# Patient Record
Sex: FEMALE | Race: WHITE | NOT HISPANIC OR LATINO | Employment: OTHER | URBAN - METROPOLITAN AREA
[De-identification: names, ages, dates, MRNs, and addresses within clinical notes are randomized per-mention and may not be internally consistent; named-entity substitution may affect disease eponyms.]

---

## 2017-12-28 ENCOUNTER — HOSPITAL ENCOUNTER (OUTPATIENT)
Dept: RHEUMATOLOGY | Facility: CLINIC | Age: 62
Discharge: HOME | End: 2017-12-28
Attending: NURSE PRACTITIONER | Admitting: NURSE PRACTITIONER

## 2020-11-13 ENCOUNTER — APPOINTMENT (OUTPATIENT)
Dept: CT IMAGING | Facility: HOSPITAL | Age: 65
End: 2020-11-13

## 2020-11-13 ENCOUNTER — HOSPITAL ENCOUNTER (EMERGENCY)
Facility: HOSPITAL | Age: 65
Discharge: HOME | End: 2020-11-13
Admitting: EMERGENCY MEDICINE

## 2020-11-13 VITALS
OXYGEN SATURATION: 96 % | TEMPERATURE: 98.2 F | HEART RATE: 63 BPM | BODY MASS INDEX: 30.34 KG/M2 | DIASTOLIC BLOOD PRESSURE: 59 MMHG | WEIGHT: 182.1 LBS | HEIGHT: 65 IN | RESPIRATION RATE: 15 BRPM | SYSTOLIC BLOOD PRESSURE: 135 MMHG

## 2020-11-13 DIAGNOSIS — R10.9 ABDOMINAL PAIN, UNSPECIFIED ABDOMINAL LOCATION: Primary | ICD-10-CM

## 2020-11-13 LAB
ALBUMIN SERPL-MCNC: 4.7 G/DL (ref 3.5–5.2)
ALBUMIN/GLOB SERPL: 2.2 G/DL
ALP SERPL-CCNC: 89 U/L (ref 39–117)
ALT SERPL W P-5'-P-CCNC: 39 U/L (ref 1–33)
ANION GAP SERPL CALCULATED.3IONS-SCNC: 11 MMOL/L (ref 5–15)
AST SERPL-CCNC: 26 U/L (ref 1–32)
BASOPHILS # BLD AUTO: 0.1 10*3/MM3 (ref 0–0.2)
BASOPHILS NFR BLD AUTO: 1.2 % (ref 0–1.5)
BILIRUB SERPL-MCNC: 0.4 MG/DL (ref 0–1.2)
BILIRUB UR QL STRIP: NEGATIVE
BUN SERPL-MCNC: 14 MG/DL (ref 8–23)
BUN/CREAT SERPL: 15.7 (ref 7–25)
CALCIUM SPEC-SCNC: 9.3 MG/DL (ref 8.6–10.5)
CHLORIDE SERPL-SCNC: 102 MMOL/L (ref 98–107)
CLARITY UR: CLEAR
CO2 SERPL-SCNC: 32 MMOL/L (ref 22–29)
COLOR UR: YELLOW
CREAT SERPL-MCNC: 0.89 MG/DL (ref 0.57–1)
DEPRECATED RDW RBC AUTO: 43.3 FL (ref 37–54)
EOSINOPHIL # BLD AUTO: 0.1 10*3/MM3 (ref 0–0.4)
EOSINOPHIL NFR BLD AUTO: 1.7 % (ref 0.3–6.2)
ERYTHROCYTE [DISTWIDTH] IN BLOOD BY AUTOMATED COUNT: 13.9 % (ref 12.3–15.4)
GFR SERPL CREATININE-BSD FRML MDRD: 64 ML/MIN/1.73
GLOBULIN UR ELPH-MCNC: 2.1 GM/DL
GLUCOSE SERPL-MCNC: 107 MG/DL (ref 65–99)
GLUCOSE UR STRIP-MCNC: NEGATIVE MG/DL
HCT VFR BLD AUTO: 45.3 % (ref 34–46.6)
HEMOCCULT STL QL IA: NEGATIVE
HGB BLD-MCNC: 15.3 G/DL (ref 12–15.9)
HGB UR QL STRIP.AUTO: NEGATIVE
HOLD SPECIMEN: NORMAL
KETONES UR QL STRIP: NEGATIVE
LEUKOCYTE ESTERASE UR QL STRIP.AUTO: NEGATIVE
LIPASE SERPL-CCNC: 56 U/L (ref 13–60)
LYMPHOCYTES # BLD AUTO: 1.9 10*3/MM3 (ref 0.7–3.1)
LYMPHOCYTES NFR BLD AUTO: 28.4 % (ref 19.6–45.3)
MCH RBC QN AUTO: 29.5 PG (ref 26.6–33)
MCHC RBC AUTO-ENTMCNC: 33.7 G/DL (ref 31.5–35.7)
MCV RBC AUTO: 87.6 FL (ref 79–97)
MONOCYTES # BLD AUTO: 0.6 10*3/MM3 (ref 0.1–0.9)
MONOCYTES NFR BLD AUTO: 9 % (ref 5–12)
NEUTROPHILS NFR BLD AUTO: 3.9 10*3/MM3 (ref 1.7–7)
NEUTROPHILS NFR BLD AUTO: 59.7 % (ref 42.7–76)
NITRITE UR QL STRIP: NEGATIVE
NRBC BLD AUTO-RTO: 0.1 /100 WBC (ref 0–0.2)
PH UR STRIP.AUTO: 7 [PH] (ref 5–8)
PLATELET # BLD AUTO: 229 10*3/MM3 (ref 140–450)
PMV BLD AUTO: 9.2 FL (ref 6–12)
POTASSIUM SERPL-SCNC: 3.3 MMOL/L (ref 3.5–5.2)
PROT SERPL-MCNC: 6.8 G/DL (ref 6–8.5)
PROT UR QL STRIP: NEGATIVE
RBC # BLD AUTO: 5.17 10*6/MM3 (ref 3.77–5.28)
SARS-COV-2 RNA PNL SPEC NAA+PROBE: NOT DETECTED
SODIUM SERPL-SCNC: 145 MMOL/L (ref 136–145)
SP GR UR STRIP: 1.01 (ref 1–1.03)
UROBILINOGEN UR QL STRIP: NORMAL
WBC # BLD AUTO: 6.5 10*3/MM3 (ref 3.4–10.8)

## 2020-11-13 PROCEDURE — 96375 TX/PRO/DX INJ NEW DRUG ADDON: CPT

## 2020-11-13 PROCEDURE — 83690 ASSAY OF LIPASE: CPT | Performed by: NURSE PRACTITIONER

## 2020-11-13 PROCEDURE — 80053 COMPREHEN METABOLIC PANEL: CPT | Performed by: NURSE PRACTITIONER

## 2020-11-13 PROCEDURE — 87635 SARS-COV-2 COVID-19 AMP PRB: CPT | Performed by: NURSE PRACTITIONER

## 2020-11-13 PROCEDURE — 96374 THER/PROPH/DIAG INJ IV PUSH: CPT

## 2020-11-13 PROCEDURE — 99283 EMERGENCY DEPT VISIT LOW MDM: CPT

## 2020-11-13 PROCEDURE — 0 IOPAMIDOL PER 1 ML: Performed by: NURSE PRACTITIONER

## 2020-11-13 PROCEDURE — 74177 CT ABD & PELVIS W/CONTRAST: CPT

## 2020-11-13 PROCEDURE — 25010000002 ONDANSETRON PER 1 MG: Performed by: NURSE PRACTITIONER

## 2020-11-13 PROCEDURE — 85025 COMPLETE CBC W/AUTO DIFF WBC: CPT | Performed by: NURSE PRACTITIONER

## 2020-11-13 PROCEDURE — 25010000002 MORPHINE PER 10 MG: Performed by: NURSE PRACTITIONER

## 2020-11-13 PROCEDURE — 81003 URINALYSIS AUTO W/O SCOPE: CPT | Performed by: NURSE PRACTITIONER

## 2020-11-13 PROCEDURE — 82274 ASSAY TEST FOR BLOOD FECAL: CPT | Performed by: NURSE PRACTITIONER

## 2020-11-13 RX ORDER — MORPHINE SULFATE 4 MG/ML
4 INJECTION, SOLUTION INTRAMUSCULAR; INTRAVENOUS ONCE
Status: COMPLETED | OUTPATIENT
Start: 2020-11-13 | End: 2020-11-13

## 2020-11-13 RX ORDER — SODIUM CHLORIDE 0.9 % (FLUSH) 0.9 %
10 SYRINGE (ML) INJECTION AS NEEDED
Status: DISCONTINUED | OUTPATIENT
Start: 2020-11-13 | End: 2020-11-14 | Stop reason: HOSPADM

## 2020-11-13 RX ORDER — ONDANSETRON 2 MG/ML
4 INJECTION INTRAMUSCULAR; INTRAVENOUS ONCE
Status: COMPLETED | OUTPATIENT
Start: 2020-11-13 | End: 2020-11-13

## 2020-11-13 RX ADMIN — ONDANSETRON 4 MG: 2 INJECTION INTRAMUSCULAR; INTRAVENOUS at 21:28

## 2020-11-13 RX ADMIN — IOPAMIDOL 100 ML: 755 INJECTION, SOLUTION INTRAVENOUS at 22:29

## 2020-11-13 RX ADMIN — SODIUM CHLORIDE 1000 ML: 9 INJECTION, SOLUTION INTRAVENOUS at 21:21

## 2020-11-13 RX ADMIN — MORPHINE SULFATE 4 MG: 4 INJECTION INTRAVENOUS at 21:28

## 2020-11-14 NOTE — ED PROVIDER NOTES
Subjective   History: Patient is a 64-year-old female complains of abdominal pain that is progressively getting worse.  She states has been going on for the last few months started around her umbilicus, sharp and stabbing waxes and wanes eating makes it worse.  States 4 days ago she had acute onset diarrhea twice with nausea and sweating while she was on the toilet.  Says she has had some low back discomfort.  Reports dark or black stool for 1 week and has had slimy and stringy BMs.  History cholecystectomy.  Reports her last colonoscopy was approximately 5 years ago states she did have some polyps but that could have been on her first colonoscopy says she believes last colonoscopy was fine.  Denies any fever chills chest pain short of breath dysuria loss of taste or smell.      Onset: Few months: Worse the last 4 days  Location: Abdomen  Duration: Waxes and wanes  Character: Initially was sharp but states now more pressure-like or heavy  Aggravating/Alleviating factors: Eating makes it worse  Radiation nonradiating  Severity: Moderate            Review of Systems   Constitutional: Negative for chills, fatigue and fever.   HENT: Negative for congestion, sore throat, tinnitus and trouble swallowing.    Eyes: Negative for photophobia, discharge and visual disturbance.   Respiratory: Negative for cough and shortness of breath.    Cardiovascular: Negative for chest pain.   Gastrointestinal: Positive for abdominal pain, diarrhea and nausea. Negative for vomiting.   Genitourinary: Negative for dysuria, frequency and urgency.   Musculoskeletal: Positive for back pain. Negative for myalgias.   Skin: Negative for rash.   Neurological: Negative for dizziness and headaches.   Psychiatric/Behavioral: Negative for confusion.       No past medical history on file.    No Known Allergies    No past surgical history on file.    No family history on file.    Social History     Socioeconomic History   • Marital status:       "Spouse name: Not on file   • Number of children: Not on file   • Years of education: Not on file   • Highest education level: Not on file           Objective   Physical Exam  Constitutional:       General: She is not in acute distress.     Appearance: She is well-developed.   HENT:      Head: Normocephalic and atraumatic.      Mouth/Throat:      Mouth: Mucous membranes are moist.      Pharynx: Oropharynx is clear.   Eyes:      Pupils: Pupils are equal, round, and reactive to light.   Cardiovascular:      Rate and Rhythm: Normal rate.      Heart sounds: No murmur.   Pulmonary:      Effort: Pulmonary effort is normal.      Breath sounds: Normal breath sounds.   Abdominal:      General: Bowel sounds are decreased. There is no distension.      Palpations: Abdomen is soft.      Tenderness: There is abdominal tenderness in the periumbilical area. There is no right CVA tenderness, left CVA tenderness, guarding or rebound. Negative signs include Trimble's sign, Rovsing's sign and McBurney's sign.   Skin:     General: Skin is warm and dry.      Findings: No rash.   Neurological:      Mental Status: She is alert and oriented to person, place, and time.         Procedures           ED Course    /80   Pulse 70   Temp 98.2 °F (36.8 °C) (Oral)   Resp 20   Ht 165.1 cm (65\")   Wt 82.6 kg (182 lb 1.6 oz)   SpO2 92%   BMI 30.30 kg/m²   Labs Reviewed   COMPREHENSIVE METABOLIC PANEL - Abnormal; Notable for the following components:       Result Value    Glucose 107 (*)     Potassium 3.3 (*)     CO2 32.0 (*)     ALT (SGPT) 39 (*)     All other components within normal limits    Narrative:     GFR Normal >60  Chronic Kidney Disease <60  Kidney Failure <15     COVID-19,ABBOTT IN-HOUSE,NP SWAB (NO TRANSPORT MEDIA) 2 HR TAT - Normal    Narrative:     Fact sheet for providers: https://www.fda.gov/media/854141/download     Fact sheet for patients: https://www.fda.gov/media/479870/download   LIPASE - Normal   URINALYSIS W/ CULTURE " IF INDICATED - Normal    Narrative:     Urine microscopic not indicated.   CBC WITH AUTO DIFFERENTIAL - Normal   OCCULT BLOOD, FECAL BY IMMUNOASSAY - Normal   CBC AND DIFFERENTIAL    Narrative:     The following orders were created for panel order CBC & Differential.  Procedure                               Abnormality         Status                     ---------                               -----------         ------                     CBC Auto Differential[[id number]]        Normal              Final result                 Please view results for these tests on the individual orders.   EXTRA TUBES    Narrative:     The following orders were created for panel order Extra Tubes.  Procedure                               Abnormality         Status                     ---------                               -----------         ------                     Gold Top - SST[[id number]]                                   Final result                 Please view results for these tests on the individual orders.   GOLD TOP - SST     Medications   sodium chloride 0.9 % flush 10 mL (has no administration in time range)   sodium chloride 0.9 % bolus 1,000 mL (1,000 mL Intravenous New Bag 11/13/20 2121)   Morphine sulfate (PF) injection 4 mg (4 mg Intravenous Given 11/13/20 2128)   ondansetron (ZOFRAN) injection 4 mg (4 mg Intravenous Given 11/13/20 2128)   iopamidol (ISOVUE-370) 76 % injection 100 mL (100 mL Intravenous Given 11/13/20 2229)     Ct Abdomen Pelvis With Contrast    Result Date: 11/13/2020  1.  Negative for appendicitis. 2. There is intra and extrahepatic biliary dilatation that appears similar on axial images going back to 2009, and is therefore likely not clinically significant. Recommend correlation with appropriate blood work. 3. Hepatic steatosis. 4. The distal descending colon and the sigmoid colon are contracted and the wall thickness cannot be evaluated. There is no surrounding inflammatory change. There is  diverticulosis of the distal sigmoid. 5. The right kidney is in the pelvis. 6. The 1.8 cm left renal lesion is slightly denser than a simple cyst. Nonemergent renal ultrasound is recommended for further evaluation.  Electronically Signed By-Marcy Manriquez MD On:11/13/2020 10:53 PM This report was finalized on [id number] by  Marcy Manriquez MD.                                               MDM     I examined the patient using the appropriate personal protective equipment.      DISPOSITION:   Chart Review: No procedures or previous CT found in chart  Comorbidity:  has no past medical history on file.    Labs: CBC unremarkable CMP glucose 107 potassium 3.3 CO2 32 ALT 39 AST 26 alk phos 89    Imaging:   Ct Abdomen Pelvis With Contrast    Result Date: 11/13/2020  1.  Negative for appendicitis. 2. There is intra and extrahepatic biliary dilatation that appears similar on axial images going back to 2009, and is therefore likely not clinically significant. Recommend correlation with appropriate blood work. 3. Hepatic steatosis. 4. The distal descending colon and the sigmoid colon are contracted and the wall thickness cannot be evaluated. There is no surrounding inflammatory change. There is diverticulosis of the distal sigmoid. 5. The right kidney is in the pelvis. 6. The 1.8 cm left renal lesion is slightly denser than a simple cyst. Nonemergent renal ultrasound is recommended for further evaluation.  Electronically Signed By-Marcy Manriquez MD On:11/13/2020 10:53 PM This report was finalized on [id number] by  Marcy Manriquez MD.      Disposition/Treatment:    Patient had IV established 1 L normal saline given morphine and Zofran.  She also had blood drawn urine collected Covid swab occult blood and CT abdomen pelvis.  Lab work was fairly unremarkable she had a normal white count and lipase.  ALT slightly elevated at 39 AST and alk phos were normal.  Urinalysis was unremarkable occult blood was negative and Covid swab was  negative.  CT abdomen pelvis did not show any definitive reason as to why patient has had abdominal discomfort over the last few months.  Was intra and extrahepatic biliary dilatation that was similar to previous images in 2009 that I could not locate in the computer, but may benefit from ultrasound.  There was diverticulosis without diverticulitis in the distal sigmoid without any inflammatory changes.  There was a 1.8 cm left renal lesion found that not emergent renal  ultrasound was further recommended.  On reevaluation of patient she stated she felt a little nauseated.  I discussed results with patient and told her to follow-up with her PCP for nonemergent renal ultrasound for lesion seen on CT. no definitive finding on CT for abdominal discomfort therefore will refer patient to gastroenterology.  Emergent reasons to return to ER discussed with patient.  Offered to write patient prescription for Zofran but she stated she did not think she needed it.    Prescription: None      Final diagnoses:   Abdominal pain, unspecified abdominal location            Radha Iqbal, APRN  11/13/20 2273

## 2020-11-14 NOTE — DISCHARGE INSTRUCTIONS
Discharge home.  Follow-up with gastroenterologist this week.  If you begin to run fever have worsening of abdominal pain nausea vomiting return to ER.

## 2020-11-18 ENCOUNTER — OFFICE (AMBULATORY)
Dept: URBAN - METROPOLITAN AREA CLINIC 64 | Facility: CLINIC | Age: 65
End: 2020-11-18

## 2020-11-18 VITALS
SYSTOLIC BLOOD PRESSURE: 171 MMHG | DIASTOLIC BLOOD PRESSURE: 101 MMHG | WEIGHT: 181 LBS | HEART RATE: 73 BPM | HEIGHT: 65 IN

## 2020-11-18 DIAGNOSIS — R94.5 ABNORMAL RESULTS OF LIVER FUNCTION STUDIES: ICD-10-CM

## 2020-11-18 DIAGNOSIS — R19.7 DIARRHEA, UNSPECIFIED: ICD-10-CM

## 2020-11-18 DIAGNOSIS — R11.2 NAUSEA WITH VOMITING, UNSPECIFIED: ICD-10-CM

## 2020-11-18 DIAGNOSIS — R93.3 ABNORMAL FINDINGS ON DIAGNOSTIC IMAGING OF OTHER PARTS OF DI: ICD-10-CM

## 2020-11-18 DIAGNOSIS — R10.9 UNSPECIFIED ABDOMINAL PAIN: ICD-10-CM

## 2020-11-18 DIAGNOSIS — Z86.010 PERSONAL HISTORY OF COLONIC POLYPS: ICD-10-CM

## 2020-11-18 PROCEDURE — 99203 OFFICE O/P NEW LOW 30 MIN: CPT | Performed by: NURSE PRACTITIONER

## 2020-11-18 RX ORDER — COLESEVELAM HYDROCHLORIDE 625 MG/1
625 TABLET, FILM COATED ORAL
Qty: 30 | Refills: 11 | Status: ACTIVE
Start: 2020-11-18

## 2021-01-06 ENCOUNTER — OFFICE (AMBULATORY)
Dept: URBAN - METROPOLITAN AREA PATHOLOGY 4 | Facility: PATHOLOGY | Age: 66
End: 2021-01-06
Payer: COMMERCIAL

## 2021-01-06 ENCOUNTER — ON CAMPUS - OUTPATIENT (AMBULATORY)
Dept: URBAN - METROPOLITAN AREA HOSPITAL 2 | Facility: HOSPITAL | Age: 66
End: 2021-01-06
Payer: COMMERCIAL

## 2021-01-06 VITALS
DIASTOLIC BLOOD PRESSURE: 63 MMHG | OXYGEN SATURATION: 96 % | DIASTOLIC BLOOD PRESSURE: 79 MMHG | SYSTOLIC BLOOD PRESSURE: 157 MMHG | SYSTOLIC BLOOD PRESSURE: 121 MMHG | DIASTOLIC BLOOD PRESSURE: 116 MMHG | HEART RATE: 58 BPM | DIASTOLIC BLOOD PRESSURE: 75 MMHG | DIASTOLIC BLOOD PRESSURE: 78 MMHG | DIASTOLIC BLOOD PRESSURE: 66 MMHG | HEART RATE: 68 BPM | OXYGEN SATURATION: 97 % | DIASTOLIC BLOOD PRESSURE: 110 MMHG | DIASTOLIC BLOOD PRESSURE: 68 MMHG | RESPIRATION RATE: 16 BRPM | SYSTOLIC BLOOD PRESSURE: 143 MMHG | HEART RATE: 70 BPM | OXYGEN SATURATION: 94 % | SYSTOLIC BLOOD PRESSURE: 124 MMHG | OXYGEN SATURATION: 95 % | HEART RATE: 60 BPM | SYSTOLIC BLOOD PRESSURE: 148 MMHG | TEMPERATURE: 96.9 F | HEART RATE: 64 BPM | RESPIRATION RATE: 18 BRPM | RESPIRATION RATE: 14 BRPM | HEART RATE: 83 BPM | OXYGEN SATURATION: 98 % | HEIGHT: 65 IN | SYSTOLIC BLOOD PRESSURE: 140 MMHG | SYSTOLIC BLOOD PRESSURE: 166 MMHG | WEIGHT: 178 LBS | SYSTOLIC BLOOD PRESSURE: 127 MMHG

## 2021-01-06 DIAGNOSIS — K57.30 DIVERTICULOSIS OF LARGE INTESTINE WITHOUT PERFORATION OR ABS: ICD-10-CM

## 2021-01-06 DIAGNOSIS — K64.4 RESIDUAL HEMORRHOIDAL SKIN TAGS: ICD-10-CM

## 2021-01-06 DIAGNOSIS — K63.5 POLYP OF COLON: ICD-10-CM

## 2021-01-06 DIAGNOSIS — Z86.010 PERSONAL HISTORY OF COLONIC POLYPS: ICD-10-CM

## 2021-01-06 DIAGNOSIS — K64.1 SECOND DEGREE HEMORRHOIDS: ICD-10-CM

## 2021-01-06 LAB
GI HISTOLOGY: A. UNSPECIFIED: (no result)
GI HISTOLOGY: PDF REPORT: (no result)

## 2021-01-06 PROCEDURE — 45380 COLONOSCOPY AND BIOPSY: CPT | Mod: 33 | Performed by: INTERNAL MEDICINE

## 2021-01-06 PROCEDURE — 88305 TISSUE EXAM BY PATHOLOGIST: CPT | Mod: 33 | Performed by: INTERNAL MEDICINE

## 2021-01-20 ENCOUNTER — OFFICE (AMBULATORY)
Dept: URBAN - METROPOLITAN AREA CLINIC 64 | Facility: CLINIC | Age: 66
End: 2021-01-20

## 2021-01-20 VITALS
DIASTOLIC BLOOD PRESSURE: 108 MMHG | WEIGHT: 182 LBS | SYSTOLIC BLOOD PRESSURE: 167 MMHG | HEART RATE: 76 BPM | HEIGHT: 65 IN

## 2021-01-20 DIAGNOSIS — K30 FUNCTIONAL DYSPEPSIA: ICD-10-CM

## 2021-01-20 DIAGNOSIS — R10.9 UNSPECIFIED ABDOMINAL PAIN: ICD-10-CM

## 2021-01-20 DIAGNOSIS — R11.2 NAUSEA WITH VOMITING, UNSPECIFIED: ICD-10-CM

## 2021-01-20 PROCEDURE — 99214 OFFICE O/P EST MOD 30 MIN: CPT | Performed by: INTERNAL MEDICINE

## 2021-04-06 ENCOUNTER — OFFICE VISIT (OUTPATIENT)
Dept: SURGERY | Facility: CLINIC | Age: 66
End: 2021-04-06

## 2021-04-06 ENCOUNTER — ANESTHESIA EVENT (OUTPATIENT)
Dept: PERIOP | Facility: HOSPITAL | Age: 66
End: 2021-04-06

## 2021-04-06 ENCOUNTER — HOSPITAL ENCOUNTER (OUTPATIENT)
Facility: HOSPITAL | Age: 66
Discharge: HOME | End: 2021-04-09
Attending: SURGERY | Admitting: SURGERY

## 2021-04-06 ENCOUNTER — ANESTHESIA (OUTPATIENT)
Dept: PERIOP | Facility: HOSPITAL | Age: 66
End: 2021-04-06

## 2021-04-06 ENCOUNTER — PREP FOR SURGERY (OUTPATIENT)
Dept: OTHER | Facility: HOSPITAL | Age: 66
End: 2021-04-06

## 2021-04-06 VITALS
DIASTOLIC BLOOD PRESSURE: 75 MMHG | TEMPERATURE: 97.5 F | OXYGEN SATURATION: 94 % | BODY MASS INDEX: 29.32 KG/M2 | SYSTOLIC BLOOD PRESSURE: 133 MMHG | HEIGHT: 65 IN | WEIGHT: 176 LBS | HEART RATE: 88 BPM

## 2021-04-06 DIAGNOSIS — L02.01 ABSCESS OF FACE: Primary | ICD-10-CM

## 2021-04-06 DIAGNOSIS — L02.01 ABSCESS OF FACE: ICD-10-CM

## 2021-04-06 PROBLEM — E03.9 HYPOTHYROIDISM (ACQUIRED): Status: ACTIVE | Noted: 2021-04-06

## 2021-04-06 PROBLEM — E87.6 HYPOKALEMIA: Chronic | Status: ACTIVE | Noted: 2021-04-06

## 2021-04-06 PROBLEM — R79.89 ELEVATED SERUM CREATININE: Chronic | Status: ACTIVE | Noted: 2021-04-06

## 2021-04-06 PROBLEM — E78.5 HYPERLIPIDEMIA: Status: ACTIVE | Noted: 2021-04-06

## 2021-04-06 PROBLEM — I10 ESSENTIAL HYPERTENSION: Chronic | Status: ACTIVE | Noted: 2021-04-06

## 2021-04-06 LAB
ALBUMIN SERPL-MCNC: 4.1 G/DL (ref 3.5–5.2)
ALBUMIN/GLOB SERPL: 1.3 G/DL
ALP SERPL-CCNC: 102 U/L (ref 39–117)
ALT SERPL W P-5'-P-CCNC: 107 U/L (ref 1–33)
ANION GAP SERPL CALCULATED.3IONS-SCNC: 14 MMOL/L (ref 5–15)
AST SERPL-CCNC: 59 U/L (ref 1–32)
BASOPHILS # BLD AUTO: 0.1 10*3/MM3 (ref 0–0.2)
BASOPHILS NFR BLD AUTO: 0.7 % (ref 0–1.5)
BILIRUB SERPL-MCNC: 0.4 MG/DL (ref 0–1.2)
BUN SERPL-MCNC: 27 MG/DL (ref 8–23)
BUN/CREAT SERPL: 22.7 (ref 7–25)
CALCIUM SPEC-SCNC: 9.4 MG/DL (ref 8.6–10.5)
CHLORIDE SERPL-SCNC: 95 MMOL/L (ref 98–107)
CO2 SERPL-SCNC: 32 MMOL/L (ref 22–29)
CREAT SERPL-MCNC: 1.19 MG/DL (ref 0.57–1)
DEPRECATED RDW RBC AUTO: 43.8 FL (ref 37–54)
EOSINOPHIL # BLD AUTO: 0 10*3/MM3 (ref 0–0.4)
EOSINOPHIL NFR BLD AUTO: 0.3 % (ref 0.3–6.2)
ERYTHROCYTE [DISTWIDTH] IN BLOOD BY AUTOMATED COUNT: 14.1 % (ref 12.3–15.4)
GFR SERPL CREATININE-BSD FRML MDRD: 46 ML/MIN/1.73
GLOBULIN UR ELPH-MCNC: 3.2 GM/DL
GLUCOSE SERPL-MCNC: 104 MG/DL (ref 65–99)
HCT VFR BLD AUTO: 39.8 % (ref 34–46.6)
HGB BLD-MCNC: 13.3 G/DL (ref 12–15.9)
LYMPHOCYTES # BLD AUTO: 1.5 10*3/MM3 (ref 0.7–3.1)
LYMPHOCYTES NFR BLD AUTO: 11.3 % (ref 19.6–45.3)
MAGNESIUM SERPL-MCNC: 2.1 MG/DL (ref 1.6–2.4)
MCH RBC QN AUTO: 29.6 PG (ref 26.6–33)
MCHC RBC AUTO-ENTMCNC: 33.6 G/DL (ref 31.5–35.7)
MCV RBC AUTO: 88.2 FL (ref 79–97)
MONOCYTES # BLD AUTO: 0.9 10*3/MM3 (ref 0.1–0.9)
MONOCYTES NFR BLD AUTO: 6.7 % (ref 5–12)
NEUTROPHILS NFR BLD AUTO: 11.1 10*3/MM3 (ref 1.7–7)
NEUTROPHILS NFR BLD AUTO: 81 % (ref 42.7–76)
NRBC BLD AUTO-RTO: 0 /100 WBC (ref 0–0.2)
PLATELET # BLD AUTO: 240 10*3/MM3 (ref 140–450)
PMV BLD AUTO: 9.7 FL (ref 6–12)
POTASSIUM SERPL-SCNC: 3.4 MMOL/L (ref 3.5–5.2)
PROT SERPL-MCNC: 7.3 G/DL (ref 6–8.5)
QT INTERVAL: 430 MS
RBC # BLD AUTO: 4.51 10*6/MM3 (ref 3.77–5.28)
SARS-COV-2 RNA PNL SPEC NAA+PROBE: NOT DETECTED
SODIUM SERPL-SCNC: 141 MMOL/L (ref 136–145)
TSH SERPL DL<=0.05 MIU/L-ACNC: 4.47 UIU/ML (ref 0.27–4.2)
WBC # BLD AUTO: 13.7 10*3/MM3 (ref 3.4–10.8)

## 2021-04-06 PROCEDURE — 80053 COMPREHEN METABOLIC PANEL: CPT | Performed by: SURGERY

## 2021-04-06 PROCEDURE — 87070 CULTURE OTHR SPECIMN AEROBIC: CPT | Performed by: SURGERY

## 2021-04-06 PROCEDURE — 85025 COMPLETE CBC W/AUTO DIFF WBC: CPT | Performed by: SURGERY

## 2021-04-06 PROCEDURE — 25010000002 DEXAMETHASONE PER 1 MG: Performed by: ANESTHESIOLOGY

## 2021-04-06 PROCEDURE — 99203 OFFICE O/P NEW LOW 30 MIN: CPT | Performed by: SURGERY

## 2021-04-06 PROCEDURE — 25010000002 FENTANYL CITRATE (PF) 100 MCG/2ML SOLUTION: Performed by: ANESTHESIOLOGY

## 2021-04-06 PROCEDURE — G0378 HOSPITAL OBSERVATION PER HR: HCPCS

## 2021-04-06 PROCEDURE — 25010000002 PROPOFOL 10 MG/ML EMULSION: Performed by: ANESTHESIOLOGY

## 2021-04-06 PROCEDURE — 83735 ASSAY OF MAGNESIUM: CPT | Performed by: PHYSICIAN ASSISTANT

## 2021-04-06 PROCEDURE — 25010000002 ONDANSETRON PER 1 MG: Performed by: ANESTHESIOLOGY

## 2021-04-06 PROCEDURE — U0003 INFECTIOUS AGENT DETECTION BY NUCLEIC ACID (DNA OR RNA); SEVERE ACUTE RESPIRATORY SYNDROME CORONAVIRUS 2 (SARS-COV-2) (CORONAVIRUS DISEASE [COVID-19]), AMPLIFIED PROBE TECHNIQUE, MAKING USE OF HIGH THROUGHPUT TECHNOLOGIES AS DESCRIBED BY CMS-2020-01-R: HCPCS | Performed by: SURGERY

## 2021-04-06 PROCEDURE — 87147 CULTURE TYPE IMMUNOLOGIC: CPT | Performed by: SURGERY

## 2021-04-06 PROCEDURE — 84443 ASSAY THYROID STIM HORMONE: CPT | Performed by: PHYSICIAN ASSISTANT

## 2021-04-06 PROCEDURE — 87040 BLOOD CULTURE FOR BACTERIA: CPT | Performed by: INTERNAL MEDICINE

## 2021-04-06 PROCEDURE — C9803 HOPD COVID-19 SPEC COLLECT: HCPCS

## 2021-04-06 PROCEDURE — 25010000002 MORPHINE PER 10 MG: Performed by: ANESTHESIOLOGY

## 2021-04-06 PROCEDURE — 93005 ELECTROCARDIOGRAM TRACING: CPT | Performed by: SURGERY

## 2021-04-06 PROCEDURE — 25010000002 CEFAZOLIN PER 500 MG: Performed by: SURGERY

## 2021-04-06 PROCEDURE — 87186 SC STD MICRODIL/AGAR DIL: CPT | Performed by: SURGERY

## 2021-04-06 PROCEDURE — 10060 I&D ABSCESS SIMPLE/SINGLE: CPT | Performed by: SURGERY

## 2021-04-06 PROCEDURE — 25010000002 LINEZOLID 600 MG/300ML SOLUTION: Performed by: INTERNAL MEDICINE

## 2021-04-06 PROCEDURE — 87205 SMEAR GRAM STAIN: CPT | Performed by: SURGERY

## 2021-04-06 PROCEDURE — 99218 PR INITIAL OBSERVATION CARE/DAY 30 MINUTES: CPT | Performed by: PHYSICIAN ASSISTANT

## 2021-04-06 RX ORDER — ROCURONIUM BROMIDE 10 MG/ML
INJECTION, SOLUTION INTRAVENOUS AS NEEDED
Status: DISCONTINUED | OUTPATIENT
Start: 2021-04-06 | End: 2021-04-06 | Stop reason: SURG

## 2021-04-06 RX ORDER — POTASSIUM CHLORIDE 20 MEQ/1
40 TABLET, EXTENDED RELEASE ORAL AS NEEDED
Status: DISCONTINUED | OUTPATIENT
Start: 2021-04-06 | End: 2021-04-09 | Stop reason: HOSPADM

## 2021-04-06 RX ORDER — OXYCODONE HYDROCHLORIDE 5 MG/1
10 TABLET ORAL EVERY 4 HOURS PRN
Status: DISCONTINUED | OUTPATIENT
Start: 2021-04-06 | End: 2021-04-09 | Stop reason: HOSPADM

## 2021-04-06 RX ORDER — ONDANSETRON 2 MG/ML
4 INJECTION INTRAMUSCULAR; INTRAVENOUS ONCE AS NEEDED
Status: COMPLETED | OUTPATIENT
Start: 2021-04-06 | End: 2021-04-06

## 2021-04-06 RX ORDER — ACETAMINOPHEN 160 MG
TABLET,DISINTEGRATING ORAL AS NEEDED
Status: DISCONTINUED | OUTPATIENT
Start: 2021-04-06 | End: 2021-04-06 | Stop reason: HOSPADM

## 2021-04-06 RX ORDER — SODIUM CHLORIDE 0.9 % (FLUSH) 0.9 %
10 SYRINGE (ML) INJECTION AS NEEDED
Status: DISCONTINUED | OUTPATIENT
Start: 2021-04-06 | End: 2021-04-06 | Stop reason: HOSPADM

## 2021-04-06 RX ORDER — LIDOCAINE HYDROCHLORIDE 10 MG/ML
0.5 INJECTION, SOLUTION INFILTRATION; PERINEURAL ONCE AS NEEDED
Status: DISCONTINUED | OUTPATIENT
Start: 2021-04-06 | End: 2021-04-06 | Stop reason: HOSPADM

## 2021-04-06 RX ORDER — SODIUM CHLORIDE, SODIUM LACTATE, POTASSIUM CHLORIDE, CALCIUM CHLORIDE 600; 310; 30; 20 MG/100ML; MG/100ML; MG/100ML; MG/100ML
1000 INJECTION, SOLUTION INTRAVENOUS CONTINUOUS
Status: DISCONTINUED | OUTPATIENT
Start: 2021-04-06 | End: 2021-04-06

## 2021-04-06 RX ORDER — ATORVASTATIN CALCIUM 10 MG/1
10 TABLET, FILM COATED ORAL DAILY
Status: DISCONTINUED | OUTPATIENT
Start: 2021-04-07 | End: 2021-04-09 | Stop reason: HOSPADM

## 2021-04-06 RX ORDER — PHENYLEPHRINE HCL IN 0.9% NACL 1 MG/10 ML
SYRINGE (ML) INTRAVENOUS AS NEEDED
Status: DISCONTINUED | OUTPATIENT
Start: 2021-04-06 | End: 2021-04-06 | Stop reason: SURG

## 2021-04-06 RX ORDER — MORPHINE SULFATE 4 MG/ML
2 INJECTION, SOLUTION INTRAMUSCULAR; INTRAVENOUS
Status: DISCONTINUED | OUTPATIENT
Start: 2021-04-06 | End: 2021-04-06 | Stop reason: HOSPADM

## 2021-04-06 RX ORDER — SODIUM CHLORIDE 9 MG/ML
100 INJECTION, SOLUTION INTRAVENOUS CONTINUOUS
Status: DISCONTINUED | OUTPATIENT
Start: 2021-04-06 | End: 2021-04-06

## 2021-04-06 RX ORDER — GLYCOPYRROLATE 1 MG/5 ML
SYRINGE (ML) INTRAVENOUS AS NEEDED
Status: DISCONTINUED | OUTPATIENT
Start: 2021-04-06 | End: 2021-04-06 | Stop reason: SURG

## 2021-04-06 RX ORDER — LABETALOL HYDROCHLORIDE 5 MG/ML
10 INJECTION, SOLUTION INTRAVENOUS EVERY 6 HOURS PRN
Status: DISCONTINUED | OUTPATIENT
Start: 2021-04-06 | End: 2021-04-09 | Stop reason: HOSPADM

## 2021-04-06 RX ORDER — LIDOCAINE HYDROCHLORIDE 10 MG/ML
INJECTION, SOLUTION EPIDURAL; INFILTRATION; INTRACAUDAL; PERINEURAL AS NEEDED
Status: DISCONTINUED | OUTPATIENT
Start: 2021-04-06 | End: 2021-04-06 | Stop reason: SURG

## 2021-04-06 RX ORDER — ACETAMINOPHEN 650 MG/1
650 SUPPOSITORY RECTAL EVERY 4 HOURS PRN
Status: DISCONTINUED | OUTPATIENT
Start: 2021-04-06 | End: 2021-04-09 | Stop reason: HOSPADM

## 2021-04-06 RX ORDER — LINEZOLID 2 MG/ML
600 INJECTION, SOLUTION INTRAVENOUS EVERY 12 HOURS
Status: DISCONTINUED | OUTPATIENT
Start: 2021-04-06 | End: 2021-04-09 | Stop reason: HOSPADM

## 2021-04-06 RX ORDER — POTASSIUM CHLORIDE 1.5 G/1.77G
40 POWDER, FOR SOLUTION ORAL AS NEEDED
Status: DISCONTINUED | OUTPATIENT
Start: 2021-04-06 | End: 2021-04-09 | Stop reason: HOSPADM

## 2021-04-06 RX ORDER — NEOSTIGMINE METHYLSULFATE 5 MG/5 ML
SYRINGE (ML) INTRAVENOUS AS NEEDED
Status: DISCONTINUED | OUTPATIENT
Start: 2021-04-06 | End: 2021-04-06 | Stop reason: SURG

## 2021-04-06 RX ORDER — FAMOTIDINE 10 MG/ML
20 INJECTION, SOLUTION INTRAVENOUS 2 TIMES DAILY
Status: DISCONTINUED | OUTPATIENT
Start: 2021-04-06 | End: 2021-04-09 | Stop reason: HOSPADM

## 2021-04-06 RX ORDER — POTASSIUM CHLORIDE 7.45 MG/ML
10 INJECTION INTRAVENOUS
Status: DISCONTINUED | OUTPATIENT
Start: 2021-04-06 | End: 2021-04-09 | Stop reason: HOSPADM

## 2021-04-06 RX ORDER — ONDANSETRON 2 MG/ML
4 INJECTION INTRAMUSCULAR; INTRAVENOUS EVERY 6 HOURS PRN
Status: DISCONTINUED | OUTPATIENT
Start: 2021-04-06 | End: 2021-04-09 | Stop reason: HOSPADM

## 2021-04-06 RX ORDER — PROMETHAZINE HYDROCHLORIDE 12.5 MG/1
12.5 SUPPOSITORY RECTAL EVERY 6 HOURS PRN
Status: DISCONTINUED | OUTPATIENT
Start: 2021-04-06 | End: 2021-04-09 | Stop reason: HOSPADM

## 2021-04-06 RX ORDER — IBUPROFEN 400 MG/1
600 TABLET ORAL ONCE AS NEEDED
Status: DISCONTINUED | OUTPATIENT
Start: 2021-04-06 | End: 2021-04-06 | Stop reason: HOSPADM

## 2021-04-06 RX ORDER — ONDANSETRON 4 MG/1
4 TABLET, FILM COATED ORAL EVERY 6 HOURS PRN
Status: DISCONTINUED | OUTPATIENT
Start: 2021-04-06 | End: 2021-04-09 | Stop reason: HOSPADM

## 2021-04-06 RX ORDER — HYDROCODONE BITARTRATE AND ACETAMINOPHEN 5; 325 MG/1; MG/1
1 TABLET ORAL EVERY 4 HOURS PRN
Status: DISCONTINUED | OUTPATIENT
Start: 2021-04-06 | End: 2021-04-09 | Stop reason: HOSPADM

## 2021-04-06 RX ORDER — NALOXONE HCL 0.4 MG/ML
0.4 VIAL (ML) INJECTION
Status: DISCONTINUED | OUTPATIENT
Start: 2021-04-06 | End: 2021-04-09 | Stop reason: HOSPADM

## 2021-04-06 RX ORDER — HYDROCODONE BITARTRATE AND ACETAMINOPHEN 5; 325 MG/1; MG/1
1 TABLET ORAL ONCE AS NEEDED
Status: DISCONTINUED | OUTPATIENT
Start: 2021-04-06 | End: 2021-04-06 | Stop reason: HOSPADM

## 2021-04-06 RX ORDER — FENTANYL CITRATE 50 UG/ML
INJECTION, SOLUTION INTRAMUSCULAR; INTRAVENOUS AS NEEDED
Status: DISCONTINUED | OUTPATIENT
Start: 2021-04-06 | End: 2021-04-06 | Stop reason: SURG

## 2021-04-06 RX ORDER — MORPHINE SULFATE 4 MG/ML
4 INJECTION, SOLUTION INTRAMUSCULAR; INTRAVENOUS
Status: DISCONTINUED | OUTPATIENT
Start: 2021-04-06 | End: 2021-04-09 | Stop reason: HOSPADM

## 2021-04-06 RX ORDER — DEXAMETHASONE SODIUM PHOSPHATE 4 MG/ML
INJECTION, SOLUTION INTRA-ARTICULAR; INTRALESIONAL; INTRAMUSCULAR; INTRAVENOUS; SOFT TISSUE AS NEEDED
Status: DISCONTINUED | OUTPATIENT
Start: 2021-04-06 | End: 2021-04-06 | Stop reason: SURG

## 2021-04-06 RX ORDER — PROMETHAZINE HYDROCHLORIDE 12.5 MG/1
12.5 TABLET ORAL EVERY 6 HOURS PRN
Status: DISCONTINUED | OUTPATIENT
Start: 2021-04-06 | End: 2021-04-09 | Stop reason: HOSPADM

## 2021-04-06 RX ORDER — HYDROMORPHONE HCL 110MG/55ML
0.5 PATIENT CONTROLLED ANALGESIA SYRINGE INTRAVENOUS
Status: DISCONTINUED | OUTPATIENT
Start: 2021-04-06 | End: 2021-04-09 | Stop reason: HOSPADM

## 2021-04-06 RX ORDER — LEVOTHYROXINE SODIUM 0.03 MG/1
25 TABLET ORAL
Status: DISCONTINUED | OUTPATIENT
Start: 2021-04-07 | End: 2021-04-09 | Stop reason: HOSPADM

## 2021-04-06 RX ORDER — ATORVASTATIN CALCIUM 10 MG/1
10 TABLET, FILM COATED ORAL NIGHTLY
Status: DISCONTINUED | OUTPATIENT
Start: 2021-04-07 | End: 2021-04-06

## 2021-04-06 RX ORDER — NALOXONE HCL 0.4 MG/ML
0.1 VIAL (ML) INJECTION
Status: DISCONTINUED | OUTPATIENT
Start: 2021-04-06 | End: 2021-04-09 | Stop reason: HOSPADM

## 2021-04-06 RX ORDER — SODIUM CHLORIDE 9 MG/ML
100 INJECTION, SOLUTION INTRAVENOUS CONTINUOUS
Status: CANCELLED | OUTPATIENT
Start: 2021-04-06

## 2021-04-06 RX ORDER — PROPOFOL 10 MG/ML
VIAL (ML) INTRAVENOUS AS NEEDED
Status: DISCONTINUED | OUTPATIENT
Start: 2021-04-06 | End: 2021-04-06 | Stop reason: SURG

## 2021-04-06 RX ORDER — ACETAMINOPHEN 325 MG/1
650 TABLET ORAL EVERY 4 HOURS PRN
Status: DISCONTINUED | OUTPATIENT
Start: 2021-04-06 | End: 2021-04-09 | Stop reason: HOSPADM

## 2021-04-06 RX ORDER — SODIUM CHLORIDE 9 MG/ML
100 INJECTION, SOLUTION INTRAVENOUS CONTINUOUS
Status: DISCONTINUED | OUTPATIENT
Start: 2021-04-06 | End: 2021-04-09 | Stop reason: HOSPADM

## 2021-04-06 RX ADMIN — MORPHINE SULFATE 2 MG: 4 INJECTION INTRAVENOUS at 17:56

## 2021-04-06 RX ADMIN — SUGAMMADEX 200 MG: 100 INJECTION, SOLUTION INTRAVENOUS at 17:12

## 2021-04-06 RX ADMIN — FAMOTIDINE 20 MG: 10 INJECTION INTRAVENOUS at 20:35

## 2021-04-06 RX ADMIN — Medication 0.8 MG: at 17:04

## 2021-04-06 RX ADMIN — SODIUM CHLORIDE 100 ML/HR: 9 INJECTION, SOLUTION INTRAVENOUS at 20:35

## 2021-04-06 RX ADMIN — SODIUM CHLORIDE, POTASSIUM CHLORIDE, SODIUM LACTATE AND CALCIUM CHLORIDE 1000 ML: 600; 310; 30; 20 INJECTION, SOLUTION INTRAVENOUS at 13:50

## 2021-04-06 RX ADMIN — LIDOCAINE HYDROCHLORIDE 50 MG: 10 INJECTION, SOLUTION EPIDURAL; INFILTRATION; INTRACAUDAL; PERINEURAL at 16:39

## 2021-04-06 RX ADMIN — LINEZOLID 600 MG: 600 INJECTION, SOLUTION INTRAVENOUS at 21:07

## 2021-04-06 RX ADMIN — Medication 150 MCG: at 16:53

## 2021-04-06 RX ADMIN — Medication 150 MCG: at 16:50

## 2021-04-06 RX ADMIN — SODIUM CHLORIDE, POTASSIUM CHLORIDE, SODIUM LACTATE AND CALCIUM CHLORIDE: 600; 310; 30; 20 INJECTION, SOLUTION INTRAVENOUS at 17:12

## 2021-04-06 RX ADMIN — CEFAZOLIN SODIUM 2 G: 10 INJECTION, POWDER, FOR SOLUTION INTRAVENOUS at 16:45

## 2021-04-06 RX ADMIN — PROPOFOL 170 MG: 10 INJECTION, EMULSION INTRAVENOUS at 16:39

## 2021-04-06 RX ADMIN — Medication 5 MG: at 17:04

## 2021-04-06 RX ADMIN — FENTANYL CITRATE 100 MCG: 50 INJECTION, SOLUTION INTRAMUSCULAR; INTRAVENOUS at 16:39

## 2021-04-06 RX ADMIN — ONDANSETRON 4 MG: 2 INJECTION INTRAMUSCULAR; INTRAVENOUS at 17:00

## 2021-04-06 RX ADMIN — ROCURONIUM BROMIDE 40 MG: 10 INJECTION INTRAVENOUS at 16:39

## 2021-04-06 RX ADMIN — DEXAMETHASONE SODIUM PHOSPHATE 4 MG: 4 INJECTION, SOLUTION INTRAMUSCULAR; INTRAVENOUS at 16:39

## 2021-04-06 NOTE — OP NOTE
INCISION AND DRAINAGE WOUND  Procedure Report    Patient Name:  Joi Cheng  YOB: 1955    Date of Surgery:  4/6/2021     Indications: Abscess right face    Pre-op Diagnosis:   Abscess of face [L02.01]       Post-Op Diagnosis Codes:     * Abscess of face [L02.01]    Procedure/CPT® Codes:      Procedure(s):  Incision and drainage of abscess of right face/cheek    Staff:  Surgeon(s):  Ariel Hernandes DO         Anesthesia: General    Anesthetist: Dr. Teresa    Estimated Blood Loss: minimal    Implants:    Nothing was implanted during the procedure    Specimen:          None        Findings: Large abscess right face    Complications: None    Description of Procedure: Cher is a 65-year-old seen in the office earlier today with a abscess of the right face quite large.  It extended from above the angle of the mouth down to below the chin.  She was draining purulent material.  This began last Thursday.  She has been on Bactrim since Saturday.  We recommended that she be taken to the operating room for wider drainage irrigation and drain placement.  For that reason she presents.    Patient was taken operating room placed in supine position.  General was done by Dr. Teresa.  Timeout done identity verified.  Face was then prepped with pHisoDerm and after 3 minutes and sterile drapes applied.  We inserted index finger of the left hand into the mouth and gently squeezed volumes of purulent material then rolled out.  We then enlarged the skin opening a bit and irrigated the cavity.  She still had some hard tissue that extended down below the chin we made a small quarter inch stab wound down at the most inferior aspect of this and indeed the abscess did track down to this area.  We then were able to irrigated copiously and in an effort to keep it open then we placed 1/4 inch Penrose drain through both openings and sewed it to itself almost like an anal seton.  Absorptive gauze dressing was then  placed she was awakened and transferred to recovery in satisfactory condition.  Due to the severe nature of this we will keep her in the hospital on IV antibiotics for at least 24 hours to see how she responds.        Ariel Hernandes,      Date: 4/6/2021  Time: 17:08 EDT

## 2021-04-06 NOTE — PROGRESS NOTES
Subjective   Joi Cheng is a 65 y.o. female.     History of present illness  Cher is a pleasant 65-year-old seen in the office today at the request Dr. Frank her primary care physician for an abscess of the right face.  Patient states she noticed on Thursday evening a small pimple-like area that progressively worsened and over the next 24 to 36 hours just got very inflamed she had lots of redness and swelling.  She was started on Bactrim on Saturday and saw her physician yesterday.  She now has very thick purulent material coming from this and the inside of her mouth is extremely swollen.  She is concerned about biting down into this abscess.    I have explained she needs to have this opened and widely drained irrigated in the operating room.  She understands and agrees.  We will add her to the OR schedule for today.    Past Medical History:   Diagnosis Date   • Arthritis     Rheumatoid   • Disease of thyroid gland    • Hyperlipidemia    • Hypertension        Past Surgical History:   Procedure Laterality Date   • ABDOMINAL SURGERY     • CHOLECYSTECTOMY     • FINGER SURGERY     • HYSTERECTOMY     • OOPHORECTOMY     • THYROID SURGERY     • TUBAL ABDOMINAL LIGATION      1 tube removed       Outpatient Encounter Medications as of 4/6/2021   Medication Sig Dispense Refill   • atorvastatin (Lipitor) 10 MG tablet LIPITOR 10 MG TABS     • colesevelam (WELCHOL) 3.75 g pack pack Take  by mouth As Needed.     • escitalopram (LEXAPRO) 20 MG tablet ESCITALOPRAM OXALATE 20 MG TABS     • levothyroxine (Synthroid) 25 MCG tablet SYNTHROID 25 MCG TABS     • lisinopril-hydrochlorothiazide (PRINZIDE,ZESTORETIC) 10-12.5 MG per tablet LISINOPRIL-HYDROCHLOROTHIAZIDE 10-12.5 MG TABS     • meloxicam (Mobic) 7.5 MG tablet MOBIC 7.5 MG TABS     • sulfamethoxazole-trimethoprim (BACTRIM DS,SEPTRA DS) 800-160 MG per tablet Take 1 tablet by mouth 2 (Two) Times a Day. 20 tablet 0     No facility-administered encounter medications on  file as of 4/6/2021.       No Known Allergies    Family History   Problem Relation Age of Onset   • Diabetes Mother    • COPD Father        Social History     Socioeconomic History   • Marital status:      Spouse name: Not on file   • Number of children: Not on file   • Years of education: Not on file   • Highest education level: Not on file   Tobacco Use   • Smoking status: Never Smoker   • Smokeless tobacco: Never Used   Vaping Use   • Vaping Use: Never used   Substance and Sexual Activity   • Alcohol use: Defer     Comment: rarely   • Drug use: Defer   • Sexual activity: Defer       The following portions of the patient's history were reviewed and updated as appropriate: allergies, current medications, past family history, past medical history, past social history, past surgical history and problem list.    Objective     Complete review of systems is done and unremarkable exception the chief complaint.    Physical exam shows a pleasant 65-year-old female.  The right side of her face is extremely swollen.  She has a large abscess to the right side of her face.  This extends submucosally to the mouth.  Head and neck exam is otherwise unremarkable.  Heart is regular.  Lungs are clear.  Abdomen is soft and nontender.  Extremities with equal range of motion and usage.  Neuro with no obvious focal deficit.    Impression: 65-year-old with abscess of the right face that needs to be opened and drained.  We will add her to the OR schedule for later today.  Assessment/Plan   There are no diagnoses linked to this encounter.               Ariel Hernandes DO  4/6/2021  08:37 EDT

## 2021-04-06 NOTE — ANESTHESIA PROCEDURE NOTES
Airway  Urgency: elective    Date/Time: 4/6/2021 4:43 PM  Airway not difficult    General Information and Staff    Patient location during procedure: OR  Anesthesiologist: Jerome Teresa MD    Indications and Patient Condition  Indications for airway management: airway protection    Preoxygenated: yes  MILS not maintained throughout  Mask difficulty assessment: 1 - vent by mask    Final Airway Details  Final airway type: endotracheal airway      Successful airway: ETT  Cuffed: yes   Successful intubation technique: direct laryngoscopy  Facilitating devices/methods: Bougie  Endotracheal tube insertion site: oral  Blade: Linda  Blade size: 4  ETT size (mm): 7.0  Cormack-Lehane Classification: grade IIb - view of arytenoids or posterior of glottis only  Placement verified by: capnometry and palpation of cuff   Measured from: lips  ETT/EBT  to lips (cm): 21  Number of attempts at approach: 1  Assessment: lips, teeth, and gum same as pre-op and atraumatic intubation    Additional Comments  ASA monitors applied; preoxygenated with 100% FiO2 via anesthesia face mask; induction of general anesthesia; bag-mask ventilation; patient's position optimized; laryngoscopy; cuffed ETT lubricated with lidocaine jelly and placed into the trachea; cuff inflated to seal with minimally occlusive airway cuff pressure; ETT connected to anesthesia circuit; atraumatic/dentition in preoperative condition; ETT secured in place; correct placement in the trachea confirmed by bilateral chest rise, tube condensation, and return of EtCO2 > 30 mmHg x3

## 2021-04-06 NOTE — ANESTHESIA POSTPROCEDURE EVALUATION
Patient: Joi Cheng    Procedure Summary     Date: 04/06/21 Room / Location: [facility] OR 08 / [facility] MAIN OR    Anesthesia Start: 1632 Anesthesia Stop: 1717    Procedure: Incision and drainage of abscess of right face/cheek (Right Face) Diagnosis:       Abscess of face      (Abscess of face [L02.01])    Surgeons: Ariel Hernandes DO Provider: Jerome Teresa MD    Anesthesia Type: general ASA Status: 2          Anesthesia Type: general    Vitals  Vitals Value Taken Time   /78 04/06/21 1816   Temp 99.3 °F (37.4 °C) 04/06/21 1816   Pulse 78 04/06/21 1819   Resp 12 04/06/21 1816   SpO2 92 % 04/06/21 1819   Vitals shown include unvalidated device data.        Post Anesthesia Care and Evaluation    Patient location during evaluation: PACU  Patient participation: complete - patient participated  Level of consciousness: awake  Pain scale: See nurse's notes for pain score.  Pain management: adequate  Airway patency: patent  Anesthetic complications: No anesthetic complications  PONV Status: none  Cardiovascular status: acceptable  Respiratory status: acceptable  Hydration status: acceptable    Comments: Patient seen and examined postoperatively; vital signs stable; SpO2 greater than or equal to 90%; cardiopulmonary status stable; nausea/vomiting adequately controlled; pain adequately controlled; no apparent anesthesia complications; patient discharged from anesthesia care when discharge criteria were met

## 2021-04-06 NOTE — H&P
Subjective   Joi Cheng is a 65 y.o. female.     History of present illness  Cher is a pleasant 65-year-old seen in the office today at the request Dr. Frank her primary care physician for an abscess of the right face.  Patient states she noticed on Thursday evening a small pimple-like area that progressively worsened and over the next 24 to 36 hours just got very inflamed she had lots of redness and swelling.  She was started on Bactrim on Saturday and saw her physician yesterday.  She now has very thick purulent material coming from this and the inside of her mouth is extremely swollen.  She is concerned about biting down into this abscess.    I have explained she needs to have this opened and widely drained irrigated in the operating room.  She understands and agrees.  We will add her to the OR schedule for today.    Past Medical History:   Diagnosis Date   • Arthritis     Rheumatoid   • Disease of thyroid gland    • Hyperlipidemia    • Hypertension        Past Surgical History:   Procedure Laterality Date   • ABDOMINAL SURGERY     • CHOLECYSTECTOMY     • FINGER SURGERY     • HYSTERECTOMY     • OOPHORECTOMY     • THYROID SURGERY     • TUBAL ABDOMINAL LIGATION      1 tube removed       Outpatient Encounter Medications as of 4/6/2021   Medication Sig Dispense Refill   • atorvastatin (Lipitor) 10 MG tablet LIPITOR 10 MG TABS     • colesevelam (WELCHOL) 3.75 g pack pack Take  by mouth As Needed.     • escitalopram (LEXAPRO) 20 MG tablet ESCITALOPRAM OXALATE 20 MG TABS     • levothyroxine (Synthroid) 25 MCG tablet SYNTHROID 25 MCG TABS     • lisinopril-hydrochlorothiazide (PRINZIDE,ZESTORETIC) 10-12.5 MG per tablet LISINOPRIL-HYDROCHLOROTHIAZIDE 10-12.5 MG TABS     • meloxicam (Mobic) 7.5 MG tablet MOBIC 7.5 MG TABS     • sulfamethoxazole-trimethoprim (BACTRIM DS,SEPTRA DS) 800-160 MG per tablet Take 1 tablet by mouth 2 (Two) Times a Day. 20 tablet 0     No facility-administered encounter medications on  file as of 4/6/2021.       No Known Allergies    Family History   Problem Relation Age of Onset   • Diabetes Mother    • COPD Father        Social History     Socioeconomic History   • Marital status:      Spouse name: Not on file   • Number of children: Not on file   • Years of education: Not on file   • Highest education level: Not on file   Tobacco Use   • Smoking status: Never Smoker   • Smokeless tobacco: Never Used   Vaping Use   • Vaping Use: Never used   Substance and Sexual Activity   • Alcohol use: Defer     Comment: rarely   • Drug use: Defer   • Sexual activity: Defer       The following portions of the patient's history were reviewed and updated as appropriate: allergies, current medications, past family history, past medical history, past social history, past surgical history and problem list.    Objective     Complete review of systems is done and unremarkable exception the chief complaint.    Physical exam shows a pleasant 65-year-old female.  The right side of her face is extremely swollen.  She has a large abscess to the right side of her face.  This extends submucosally to the mouth.  Head and neck exam is otherwise unremarkable.  Heart is regular.  Lungs are clear.  Abdomen is soft and nontender.  Extremities with equal range of motion and usage.  Neuro with no obvious focal deficit.    Impression: 65-year-old with abscess of the right face that needs to be opened and drained.  We will add her to the OR schedule for later today.  Assessment/Plan   There are no diagnoses linked to this encounter.               Ariel Hernandes DO  4/6/2021  08:40 EDT

## 2021-04-06 NOTE — ANESTHESIA PREPROCEDURE EVALUATION
Anesthesia Evaluation     Patient summary reviewed and Nursing notes reviewed   history of anesthetic complications: PONV  NPO Solid Status: > 8 hours  NPO Liquid Status: > 8 hours           Airway   Mallampati: II  TM distance: >3 FB  Neck ROM: full  No difficulty expected  Dental - normal exam     Pulmonary - normal exam   Cardiovascular - normal exam    ECG reviewed    (+) hypertension, hyperlipidemia,       Neuro/Psych  GI/Hepatic/Renal/Endo    (+)   thyroid problem hypothyroidism    Musculoskeletal     Abdominal  - normal exam    Bowel sounds: normal.   Substance History      OB/GYN          Other   arthritis,      ROS/Med Hx Other: Sinus or ectopic atrial rhythm  Probable left atrial enlargement  Left anterior fascicular block                  Anesthesia Plan    ASA 2     general     intravenous induction     Anesthetic plan, all risks, benefits, and alternatives have been provided, discussed and informed consent has been obtained with: patient.

## 2021-04-07 LAB
ALBUMIN SERPL-MCNC: 3.5 G/DL (ref 3.5–5.2)
ALP SERPL-CCNC: 114 U/L (ref 39–117)
ALT SERPL W P-5'-P-CCNC: 143 U/L (ref 1–33)
ANION GAP SERPL CALCULATED.3IONS-SCNC: 9 MMOL/L (ref 5–15)
AST SERPL-CCNC: 104 U/L (ref 1–32)
BASOPHILS # BLD AUTO: 0 10*3/MM3 (ref 0–0.2)
BASOPHILS NFR BLD AUTO: 0.2 % (ref 0–1.5)
BILIRUB CONJ SERPL-MCNC: <0.2 MG/DL (ref 0–0.3)
BILIRUB INDIRECT SERPL-MCNC: ABNORMAL MG/DL
BILIRUB SERPL-MCNC: 0.4 MG/DL (ref 0–1.2)
BUN SERPL-MCNC: 18 MG/DL (ref 8–23)
BUN/CREAT SERPL: 20.5 (ref 7–25)
CALCIUM SPEC-SCNC: 8.4 MG/DL (ref 8.6–10.5)
CHLORIDE SERPL-SCNC: 98 MMOL/L (ref 98–107)
CHOLEST SERPL-MCNC: 119 MG/DL (ref 0–200)
CO2 SERPL-SCNC: 33 MMOL/L (ref 22–29)
CREAT SERPL-MCNC: 0.88 MG/DL (ref 0.57–1)
DEPRECATED RDW RBC AUTO: 43.8 FL (ref 37–54)
EOSINOPHIL # BLD AUTO: 0 10*3/MM3 (ref 0–0.4)
EOSINOPHIL NFR BLD AUTO: 0 % (ref 0.3–6.2)
ERYTHROCYTE [DISTWIDTH] IN BLOOD BY AUTOMATED COUNT: 14 % (ref 12.3–15.4)
GFR SERPL CREATININE-BSD FRML MDRD: 65 ML/MIN/1.73
GLUCOSE SERPL-MCNC: 127 MG/DL (ref 65–99)
HCT VFR BLD AUTO: 36.2 % (ref 34–46.6)
HDLC SERPL-MCNC: 40 MG/DL (ref 40–60)
HGB BLD-MCNC: 12.3 G/DL (ref 12–15.9)
LDLC SERPL CALC-MCNC: 63 MG/DL (ref 0–100)
LDLC/HDLC SERPL: 1.57 {RATIO}
LYMPHOCYTES # BLD AUTO: 1.2 10*3/MM3 (ref 0.7–3.1)
LYMPHOCYTES NFR BLD AUTO: 12.6 % (ref 19.6–45.3)
MAGNESIUM SERPL-MCNC: 2.1 MG/DL (ref 1.6–2.4)
MCH RBC QN AUTO: 29.8 PG (ref 26.6–33)
MCHC RBC AUTO-ENTMCNC: 33.9 G/DL (ref 31.5–35.7)
MCV RBC AUTO: 87.8 FL (ref 79–97)
MONOCYTES # BLD AUTO: 0.6 10*3/MM3 (ref 0.1–0.9)
MONOCYTES NFR BLD AUTO: 6.1 % (ref 5–12)
NEUTROPHILS NFR BLD AUTO: 8 10*3/MM3 (ref 1.7–7)
NEUTROPHILS NFR BLD AUTO: 81.1 % (ref 42.7–76)
NRBC BLD AUTO-RTO: 0.1 /100 WBC (ref 0–0.2)
PLATELET # BLD AUTO: 226 10*3/MM3 (ref 140–450)
PMV BLD AUTO: 9.4 FL (ref 6–12)
POTASSIUM SERPL-SCNC: 3.7 MMOL/L (ref 3.5–5.2)
PROT SERPL-MCNC: 6.6 G/DL (ref 6–8.5)
RBC # BLD AUTO: 4.13 10*6/MM3 (ref 3.77–5.28)
SODIUM SERPL-SCNC: 140 MMOL/L (ref 136–145)
TRIGL SERPL-MCNC: 82 MG/DL (ref 0–150)
VLDLC SERPL-MCNC: 16 MG/DL (ref 5–40)
WBC # BLD AUTO: 9.9 10*3/MM3 (ref 3.4–10.8)

## 2021-04-07 PROCEDURE — 99024 POSTOP FOLLOW-UP VISIT: CPT | Performed by: SURGERY

## 2021-04-07 PROCEDURE — 25010000002 LINEZOLID 600 MG/300ML SOLUTION: Performed by: INTERNAL MEDICINE

## 2021-04-07 PROCEDURE — G0378 HOSPITAL OBSERVATION PER HR: HCPCS

## 2021-04-07 PROCEDURE — 83735 ASSAY OF MAGNESIUM: CPT | Performed by: PHYSICIAN ASSISTANT

## 2021-04-07 PROCEDURE — 80061 LIPID PANEL: CPT | Performed by: PHYSICIAN ASSISTANT

## 2021-04-07 PROCEDURE — 80076 HEPATIC FUNCTION PANEL: CPT | Performed by: INTERNAL MEDICINE

## 2021-04-07 PROCEDURE — 25010000002 PIPERACILLIN SOD-TAZOBACTAM PER 1 G: Performed by: SURGERY

## 2021-04-07 PROCEDURE — 85025 COMPLETE CBC W/AUTO DIFF WBC: CPT | Performed by: SURGERY

## 2021-04-07 PROCEDURE — 80048 BASIC METABOLIC PNL TOTAL CA: CPT | Performed by: SURGERY

## 2021-04-07 PROCEDURE — 99225 PR SBSQ OBSERVATION CARE/DAY 25 MINUTES: CPT | Performed by: INTERNAL MEDICINE

## 2021-04-07 RX ORDER — LISINOPRIL 5 MG/1
10 TABLET ORAL
Status: DISCONTINUED | OUTPATIENT
Start: 2021-04-08 | End: 2021-04-09 | Stop reason: HOSPADM

## 2021-04-07 RX ADMIN — LEVOTHYROXINE SODIUM 25 MCG: 0.03 TABLET ORAL at 05:49

## 2021-04-07 RX ADMIN — PIPERACILLIN AND TAZOBACTAM 3.38 G: 3; .375 INJECTION, POWDER, LYOPHILIZED, FOR SOLUTION INTRAVENOUS at 08:15

## 2021-04-07 RX ADMIN — FAMOTIDINE 20 MG: 10 INJECTION INTRAVENOUS at 08:16

## 2021-04-07 RX ADMIN — LINEZOLID 600 MG: 600 INJECTION, SOLUTION INTRAVENOUS at 09:37

## 2021-04-07 RX ADMIN — HYDROCODONE BITARTRATE AND ACETAMINOPHEN 1 TABLET: 5; 325 TABLET ORAL at 01:22

## 2021-04-07 RX ADMIN — PIPERACILLIN AND TAZOBACTAM 3.38 G: 3; .375 INJECTION, POWDER, LYOPHILIZED, FOR SOLUTION INTRAVENOUS at 00:11

## 2021-04-07 RX ADMIN — ATORVASTATIN CALCIUM 10 MG: 10 TABLET, FILM COATED ORAL at 08:16

## 2021-04-07 RX ADMIN — LINEZOLID 600 MG: 600 INJECTION, SOLUTION INTRAVENOUS at 20:55

## 2021-04-07 RX ADMIN — FAMOTIDINE 20 MG: 10 INJECTION INTRAVENOUS at 20:55

## 2021-04-07 NOTE — PROGRESS NOTES
"      [facility] Medicine Services Daily Progress Note      Hospitalist Team  LOS 0 days      Patient Care Team:  Ariel Rider MD as PCP - General    Patient Location: 106/1      Subjective   Subjective     Chief Complaint / Subjective  No chief complaint on file.  Follow-up facial abscess      Brief Synopsis of Hospital Course/HPI  Was admitted postoperatively by surgery after having an abscess drainage of her right side of her face underneath her chin.      Date::    4/7/2021: Reports improvement in pain after debridement.  No fevers      Review of Systems   Constitutional: Negative for fever.   Skin: Positive for poor wound healing.         Objective   Objective      Vital Signs  Temp:  [97.8 °F (36.6 °C)-99.5 °F (37.5 °C)] 97.8 °F (36.6 °C)  Heart Rate:  [61-86] 62  Resp:  [11-21] 18  BP: (110-189)/(65-82) 136/77  Oxygen Therapy  SpO2: 97 %  Pulse Oximetry Type: Intermittent  Device (Oxygen Therapy): room air  Flow (L/min): 2  Flowsheet Rows      First Filed Value   Admission Height  165.1 cm (65\") Documented at 04/06/2021 1344   Admission Weight  79.3 kg (174 lb 12.8 oz) Documented at 04/06/2021 1344        Intake & Output (last 3 days)       04/04 0701 - 04/05 0700 04/05 0701 - 04/06 0700 04/06 0701 - 04/07 0700 04/07 0701 - 04/08 0700    P.O.   350 518    I.V. (mL/kg)   2000 (24.8)     Total Intake(mL/kg)   2350 (29.2) 518 (6.4)    Net   +2350 +518                Lines, Drains & Airways    Active LDAs     Name:   Placement date:   Placement time:   Site:   Days:    Peripheral IV 04/06/21 1350 Anterior;Left;Upper Arm   04/06/21    1350    Arm   1    Open Drain Right Other (Comment)   04/06/21    1701    Other (Comment) face   less than 1                  Physical Exam:    Physical Exam  Vitals reviewed.   Constitutional:       General: She is not in acute distress.  Neck:      Comments: Dressing noted underneath the right shin clean dry and intact  Pulmonary:      Effort: Pulmonary effort " is normal. No respiratory distress.   Abdominal:      General: There is no distension.      Palpations: Abdomen is soft.   Skin:     General: Skin is warm and dry.   Neurological:      Mental Status: She is alert and oriented to person, place, and time.   Psychiatric:         Behavior: Behavior normal.              Wounds (last 24 hours)      LDA Wound     Row Name 04/07/21 1118 04/07/21 0707 04/06/21 2343       Wound 04/06/21 1656 Right cheek Abcess    Wound - Properties Group Placement Date: 04/06/21 -JY Placement Time: 1656 -JY Present on Hospital Admission: Y  -JY Side: Right  -JY Location: cheek  -JY Primary Wound Type: Abcess  -JY    Dressing Appearance  dressing loose  -CH  intact shadow drainage  -CH  --    Closure  LE  -CH  LE  -CH  --  -KH    Drainage Characteristics/Odor  --  --  --  -KH    Drainage Amount  -- shadow drainage  -CH  --  --    Periwound Care  --  dry periwound area maintained  -CH  --    Retired Wound - Properties Group Date first assessed: 04/06/21 -JY Time first assessed: 1656 -JY Present on Hospital Admission: Y  -JY Side: Right  -JY Location: cheek  -JY Primary Wound Type: Abcess  -JY    Row Name 04/06/21 1936 04/06/21 1817 04/06/21 1802       Wound 04/06/21 1656 Right cheek Abcess    Wound - Properties Group Placement Date: 04/06/21 -JY Placement Time: 1656 -JY Present on Hospital Admission: Y  -JY Side: Right  -JY Location: cheek  -JY Primary Wound Type: Abcess  -JY    Dressing Appearance  intact;moist drainage  -KH  intact;no drainage  -MEGGAN  dry;intact;no drainage  -MEGGAN    Closure  LE  -KH  --  --    Drainage Characteristics/Odor  bleeding controlled  -KH  serosanguineous  -MEGGAN  serosanguineous  -MEGGAN    Drainage Amount  --  small  -MEGGAN  small  -MEGGAN    Dressing Care  dressing reinforced  -KH  --  --    Retired Wound - Properties Group Date first assessed: 04/06/21 -JY Time first assessed: 1656 -JY Present on Hospital Admission: Y  -JY Side: Right  -JY Location: cheek  -JY Primary  Wound Type: Abcess  -JY    Row Name 04/06/21 1747 04/06/21 1732 04/06/21 1717       Wound 04/06/21 1656 Right cheek Abcess    Wound - Properties Group Placement Date: 04/06/21 -JY Placement Time: 1656 -JY Present on Hospital Admission: Y  -JY Side: Right  -JY Location: cheek  -JY Primary Wound Type: Abcess  -JY    Dressing Appearance  intact;moist drainage  -MEGGAN  dry;intact;no drainage  -MEGGAN  dry;intact;no drainage  -MEGGAN    Drainage Characteristics/Odor  serosanguineous  -MEGGAN  --  --    Drainage Amount  small  -MEGGAN  --  --    Retired Wound - Properties Group Date first assessed: 04/06/21 -JY Time first assessed: 1656 -JY Present on Hospital Admission: Y  -JY Side: Right  -JY Location: cheek  -JY Primary Wound Type: Abcess  -JY    Row Name 04/06/21 1712             Wound 04/06/21 1656 Right cheek Abcess    Wound - Properties Group Placement Date: 04/06/21 -JY Placement Time: 1656 -JY Present on Hospital Admission: Y  -JY Side: Right  -JY Location: cheek  -JY Primary Wound Type: Abcess  -JY    Dressing Care  dressing applied fluffs, kerlix, tape  -JY      Retired Wound - Properties Group Date first assessed: 04/06/21 -JY Time first assessed: 1656 -JY Present on Hospital Admission: Y  -JY Side: Right  -JY Location: cheek  -JY Primary Wound Type: Abcess  -JY      User Key  (r) = Recorded By, (t) = Taken By, (c) = Cosigned By    Initials Name Provider Type    Sherly Kirk RN Registered Nurse    Nova Lockett RN Registered Nurse    Clarissa Enamorado RN Registered Nurse    Marcy Blanchard RN Registered Nurse          Procedures:    Procedure(s):  Incision and drainage of abscess of right face/cheek          Results Review:     I reviewed the patient's new clinical results.      Lab Results (last 24 hours)     Procedure Component Value Units Date/Time    Hepatic Function Panel [[id number]]  (Abnormal) Collected: 04/07/21 0834    Specimen: Blood Updated: 04/07/21 1222     Total Protein 6.6 g/dL       Albumin 3.50 g/dL      ALT (SGPT) 143 U/L      AST (SGOT) 104 U/L      Alkaline Phosphatase 114 U/L      Total Bilirubin 0.4 mg/dL      Bilirubin, Direct <0.2 mg/dL      Bilirubin, Indirect --     Comment: Unable to calculate       Lipid Panel [[id number]] Collected: 04/07/21 0834    Specimen: Blood Updated: 04/07/21 1222     Total Cholesterol 119 mg/dL      Triglycerides 82 mg/dL      HDL Cholesterol 40 mg/dL      LDL Cholesterol  63 mg/dL      VLDL Cholesterol 16 mg/dL      LDL/HDL Ratio 1.57    Narrative:      Cholesterol Reference Ranges  (U.S. Department of Health and Human Services ATP III Classifications)    Desirable          <200 mg/dL  Borderline High    200-239 mg/dL  High Risk          >240 mg/dL      Triglyceride Reference Ranges  (U.S. Department of Health and Human Services ATP III Classifications)    Normal           <150 mg/dL  Borderline High  150-199 mg/dL  High             200-499 mg/dL  Very High        >500 mg/dL    HDL Reference Ranges  (U.S. Department of Health and Human Services ATP III Classifcations)    Low     <40 mg/dl (major risk factor for CHD)  High    >60 mg/dl ('negative' risk factor for CHD)        LDL Reference Ranges  (U.S. Department of Health and Human Services ATP III Classifcations)    Optimal          <100 mg/dL  Near Optimal     100-129 mg/dL  Borderline High  130-159 mg/dL  High             160-189 mg/dL  Very High        >189 mg/dL    Basic Metabolic Panel [[id number]]  (Abnormal) Collected: 04/07/21 0834    Specimen: Blood Updated: 04/07/21 0955     Glucose 127 mg/dL      BUN 18 mg/dL      Creatinine 0.88 mg/dL      Sodium 140 mmol/L      Potassium 3.7 mmol/L      Chloride 98 mmol/L      CO2 33.0 mmol/L      Calcium 8.4 mg/dL      eGFR Non African Amer 65 mL/min/1.73      BUN/Creatinine Ratio 20.5     Anion Gap 9.0 mmol/L     Narrative:      GFR Normal >60  Chronic Kidney Disease <60  Kidney Failure <15      Magnesium [[id number]]  (Normal) Collected: 04/07/21 0834     Specimen: Blood Updated: 04/07/21 0955     Magnesium 2.1 mg/dL     CBC & Differential [[id number]]  (Abnormal) Collected: 04/07/21 0834    Specimen: Blood Updated: 04/07/21 0938    Narrative:      The following orders were created for panel order CBC & Differential.  Procedure                               Abnormality         Status                     ---------                               -----------         ------                     CBC Auto Differential[[id number]]        Abnormal            Final result                 Please view results for these tests on the individual orders.    CBC Auto Differential [[id number]]  (Abnormal) Collected: 04/07/21 0834    Specimen: Blood Updated: 04/07/21 0938     WBC 9.90 10*3/mm3      RBC 4.13 10*6/mm3      Hemoglobin 12.3 g/dL      Hematocrit 36.2 %      MCV 87.8 fL      MCH 29.8 pg      MCHC 33.9 g/dL      RDW 14.0 %      RDW-SD 43.8 fl      MPV 9.4 fL      Platelets 226 10*3/mm3      Neutrophil % 81.1 %      Lymphocyte % 12.6 %      Monocyte % 6.1 %      Eosinophil % 0.0 %      Basophil % 0.2 %      Neutrophils, Absolute 8.00 10*3/mm3      Lymphocytes, Absolute 1.20 10*3/mm3      Monocytes, Absolute 0.60 10*3/mm3      Eosinophils, Absolute 0.00 10*3/mm3      Basophils, Absolute 0.00 10*3/mm3      nRBC 0.1 /100 WBC     Wound Culture - Wound, Face [[id number]] Collected: 04/06/21 1712    Specimen: Wound from Face Updated: 04/07/21 0745     Wound Culture Growth present, too young to evaluate     Gram Stain Rare (1+) Gram positive cocci in clusters      Many (4+) WBCs per low power field    TSH [[id number]]  (Abnormal) Collected: 04/06/21 1327    Specimen: Blood Updated: 04/06/21 2345     TSH 4.470 uIU/mL     Magnesium [[id number]]  (Normal) Collected: 04/06/21 1327    Specimen: Blood Updated: 04/06/21 2332     Magnesium 2.1 mg/dL     Blood Culture - Blood, Hand, Right [[id number]] Collected: 04/06/21 2105    Specimen: Blood from Hand, Right Updated: 04/06/21 2114    Blood Culture  - Blood, Arm, Right [[id number]] Collected: 04/06/21 2105    Specimen: Blood from Arm, Right Updated: 04/06/21 2114        No results found for: HGBA1C            Lab Results   Component Value Date    LIPASE 56 11/13/2020     Lab Results   Component Value Date    CHOL 119 04/07/2021    TRIG 82 04/07/2021    HDL 40 04/07/2021    LDL 63 04/07/2021       No results found for: INTRAOP, PREDX, FINALDX, COMDX    Microbiology Results (last 10 days)     Procedure Component Value - Date/Time    Wound Culture - Wound, Face [[id number]] Collected: 04/06/21 1712    Lab Status: Preliminary result Specimen: Wound from Face Updated: 04/07/21 0745     Wound Culture Growth present, too young to evaluate     Gram Stain Rare (1+) Gram positive cocci in clusters      Many (4+) WBCs per low power field    COVID-19,CEPHEID,COR/POLLO/PAD IN-HOUSE(OR EMERGENT/ADD-ON),NP SWAB IN TRANSPORT MEDIA 3-4 HR TAT, RT-PCR - Swab, Nasopharynx [[id number]]  (Normal) Collected: 04/06/21 1323    Lab Status: Final result Specimen: Swab from Nasopharynx Updated: 04/06/21 1359     COVID19 Not Detected    Narrative:      Fact sheet for providers: https://www.fda.gov/media/089412/download     Fact sheet for patients: https://www.fda.gov/media/734971/download          ECG/EMG Results (most recent)     Procedure Component Value Units Date/Time    ECG 12 Lead [[id number]] Collected: 04/06/21 1409     Updated: 04/06/21 1411     QT Interval 430 ms     Narrative:      HEART RATE= 72  bpm  RR Interval= 828  ms  UT Interval= 184  ms  P Horizontal Axis= -45  deg  P Front Axis= -51  deg  QRSD Interval= 78  ms  QT Interval= 430  ms  QRS Axis= -60  deg  T Wave Axis= 116  deg  - ABNORMAL ECG -  Sinus or ectopic atrial rhythm  Probable left atrial enlargement  Left anterior fascicular block  Abnormal T, consider ischemia, lateral leads  Electronically Signed By:   Date and Time of Study: 2021-04-06 14:09:58                  No radiology results for the last 7 days        Xrays,  labs reviewed personally by physician.    Medication Review:   I have reviewed the patient's current medication list      Scheduled Meds  atorvastatin, 10 mg, Oral, Daily  famotidine, 20 mg, Intravenous, BID  levothyroxine, 25 mcg, Oral, Q AM  Linezolid, 600 mg, Intravenous, Q12H        Meds Infusions  sodium chloride, 100 mL/hr, Last Rate: 100 mL/hr (04/07/21 1438)        Meds PRN  •  acetaminophen **OR** acetaminophen  •  HYDROcodone-acetaminophen  •  HYDROmorphone **AND** naloxone  •  labetalol  •  Morphine **AND** naloxone  •  ondansetron **OR** ondansetron  •  oxyCODONE  •  potassium chloride **OR** potassium chloride **OR** potassium chloride  •  promethazine **OR** promethazine        Assessment/Plan   Assessment/Plan     Active Hospital Problems    Diagnosis  POA   • **Abscess of face [L02.01]  Unknown   • Hyperlipidemia [E78.5]  Yes   • Essential hypertension [I10]  Yes   • Hypothyroidism (acquired) [E03.9]  Yes   • Hypokalemia [E87.6]  Yes   • Elevated serum creatinine [R79.89]  Yes      Resolved Hospital Problems   No resolved problems to display.       MEDICAL DECISION MAKING COMPLEXITY BY PROBLEM:     Abscess of face  -s/p I and D facial abscess  -post op per general surgery  -wound Cx 4/6/21: growing GPC in clusters  -on IV Zyvox  -ID following appreciate recs     CKD stage II  -Cr now 0.88  -follow     Hypokalemia  -Potassium mildly decreased at 3.4  -Check magnesium  -Replacement protocol ordered     Hypertension  -add back lisinopril      Hyperlipidemia  -Check lipid panel  -Continue statin     Depression  -Hold Lexapro while on linezolid     Hypothyroidism  -Check TSH  -Continue levothyroxine    VTE Prophylaxis -   Mechanical Order History:      Ordered        04/06/21 1843  Place Sequential Compression Device  Once         04/06/21 1843  Place Sequential Compression Device  Once         04/06/21 1843  Maintain Sequential Compression Device  Continuous                 Pharmalogical Order History:      None            Code Status -   Code Status and Medical Interventions:   Ordered at: 04/06/21 1843     Code Status:    CPR     Medical Interventions (Level of Support Prior to Arrest):    Full       This patient has been examined wearing appropriate Personal Protective Equipment. 04/07/21        Discharge Planning  Pending Cx results and when cleared by other parties        Electronically signed by Abdoul Win DO, 04/07/21, 15:03 EDT.  [facility] [place] [facility]

## 2021-04-07 NOTE — PROGRESS NOTES
Discharge Planning Assessment   Anam     Patient Name: Joi Cheng  MRN: [id number]  Today's Date: 4/7/2021    Admit Date: 4/6/2021    Discharge Needs Assessment     Row Name 04/07/21 1708       Living Environment    Lives With  spouse    Name(s) of Who Lives With Patient  Ignacio    Current Living Arrangements  home/apartment/condo    Primary Care Provided by  self    Provides Primary Care For  no one    Family Caregiver if Needed  spouse    Able to Return to Prior Arrangements  yes       Resource/Environmental Concerns    Transportation Concerns  car, none       Transition Planning    Patient/Family Anticipates Transition to  home with family    Patient/Family Anticipated Services at Transition  none    Transportation Anticipated  car, drives self;family or friend will provide       Discharge Needs Assessment    Readmission Within the Last 30 Days  no previous admission in last 30 days    Equipment Currently Used at Home  none    Concerns Comments  Possible IV antibiotics at home.    Anticipated Changes Related to Illness  none    Equipment Needed After Discharge  none    Discharge Coordination/Progress  DC Plan: Home with spouse. Current IV antibiotics.        Discharge Plan     Row Name 04/07/21 2987       Plan    Plan  DC Plan: Home with spouse. Current IV antibiotics.    Plan Comments  ID pending culture results. HH list given if requires Iv antibiotics at home.        Continued Care and Services - Admitted Since 4/6/2021    Coordination has not been started for this encounter.         Demographic Summary     Row Name 04/07/21 1707       General Information    Admission Type  observation    Arrived From  emergency department    Referral Source  admission list    Reason for Consult  discharge planning    Preferred Language  English     Used During This Interaction  no    General Information Comments  Spoke to pt in room.        Functional Status     Row Name 04/07/21 2010       Functional  Status    Usual Activity Tolerance  good    Current Activity Tolerance  good       Functional Status, IADL    Medications  independent    Meal Preparation  independent    Housekeeping  independent    Laundry  independent    Shopping  independent       Mental Status    General Appearance WDL  WDL       Mental Status Summary    Recent Changes in Mental Status/Cognitive Functioning  no changes        Met with patient in room wearing PPE: mask, goggles.      Maintained distance greater than six feet and spent less than 15 minutes in the room.               Randa Jacome RN

## 2021-04-07 NOTE — CONSULTS
"      [facility] Medicine Services      Patient Name: Joi Cheng  : 1955  MRN: [id number]  Primary Care Physician: Ariel Rider MD  Date of admission: 2021    Patient Care Team:  Ariel Rider MD as PCP - General          Subjective   History Present Illness     Chief Complaint: Facial abscess      Ms. Cheng is a 65 y.o. female with past medical history of hypothyroidism, hyperlipidemia and hypertension who presents to [facility] for drainage of a previously identified abscess on the right side of her face.  Patient reports that on 2020 when she noted what appeared to be a \"small pimple\" on the right side of her face near her mouth which rapidly enlarged in size and became significantly painful and tender to the touch over the next 2 days.  She presented to the urgent care where she was started on antibiotic and told to follow-up with her PCP.  On 2021 patient was seen by her primary care provider who referred her to general surgery for possible I&D of a large abscess which was completed on 2021.  At present patient reports pain is resolved.  She notes a mild chronic cough as well as some postnasal drip which is at baseline but denies any dyspnea, nausea, sensation of tachycardia or palpitations or chills.  Urine output is reported as normal and patient has not had a bowel movement but does report passage of flatus since her procedure.  Patient also reports that she does not smoke or drink alcohol.    Most recent labs notable for potassium: 3.4, creatinine: 1.19 with a BUN of 27 and BUN/creatinine ratio of 22.7, WBCs: 13.70 with an increased absolute neutrophil count noted on differential.  EKG showed sinus rhythm at 72 without obvious acute ST changes or ectopy and a QTC of 473 ms.  Preprocedure Covid screen was negative.    History of Present Illness    Review of Systems   Constitutional: Negative.   HENT: Negative.    Eyes: Negative.  "   Cardiovascular: Negative.    Respiratory: Positive for cough. Negative for shortness of breath and sputum production.    Endocrine: Negative.    Skin: Positive for poor wound healing.   Musculoskeletal: Negative.    Gastrointestinal: Negative.    Genitourinary: Negative.    Neurological: Negative.    Psychiatric/Behavioral: Negative.            Personal History     Past Medical History:   Past Medical History:   Diagnosis Date   • Arthritis     Rheumatoid   • Disease of thyroid gland    • Hyperlipidemia    • Hypertension    • PONV (postoperative nausea and vomiting)        Surgical History:      Past Surgical History:   Procedure Laterality Date   • ABDOMINAL SURGERY     • CHOLECYSTECTOMY     • FINGER SURGERY     • HYSTERECTOMY     • OOPHORECTOMY     • THYROID SURGERY     • TUBAL ABDOMINAL LIGATION      1 tube removed           Family History: family history includes COPD in her father; Diabetes in her mother. Otherwise pertinent FHx was reviewed and unremarkable.     Social History:  reports that she has never smoked. She has never used smokeless tobacco. She reports previous alcohol use. She reports that she does not use drugs.      Medications:  Prior to Admission medications    Medication Sig Start Date End Date Taking? Authorizing Provider   atorvastatin (Lipitor) 10 MG tablet LIPITOR 10 MG TABS 6/28/18  Yes Emergency, Nurse Epic, RN   colesevelam (WELCHOL) 3.75 g pack pack Take  by mouth As Needed.   Yes Emergency, Nurse Johan RN   escitalopram (LEXAPRO) 20 MG tablet ESCITALOPRAM OXALATE 20 MG TABS 8/25/16  Yes Emergency, Nurse Johan RN   levothyroxine (Synthroid) 25 MCG tablet SYNTHROID 25 MCG TABS 11/19/15  Yes Emergency, Nurse Johan RN   lisinopril-hydrochlorothiazide (PRINZIDE,ZESTORETIC) 10-12.5 MG per tablet LISINOPRIL-HYDROCHLOROTHIAZIDE 10-12.5 MG TABS 4/15/17  Yes Emergency, Nurse Epic, RN   meloxicam (Mobic) 7.5 MG tablet MOBIC 7.5 MG TABS 5/7/12  Yes Emergency, Nurse TALI Prado    sulfamethoxazole-trimethoprim (BACTRIM DS,SEPTRA DS) 800-160 MG per tablet Take 1 tablet by mouth 2 (Two) Times a Day. 4/3/21  Yes VillafanaCesar APRN       Allergies:  No Known Allergies    Objective   Objective     Vital Signs  Temp:  [97.2 °F (36.2 °C)-99.5 °F (37.5 °C)] 98.8 °F (37.1 °C)  Heart Rate:  [73-88] 85  Resp:  [11-21] 18  BP: (133-189)/(59-82) 168/82  SpO2:  [91 %-100 %] 92 %  on  Flow (L/min):  [2-4] 2;   Device (Oxygen Therapy): room air  Body mass index is 29.61 kg/m².    Physical Exam  Vitals reviewed.   Constitutional:       General: She is not in acute distress.     Appearance: Normal appearance. She is normal weight. She is not ill-appearing or toxic-appearing.   HENT:      Head: Normocephalic and atraumatic.      Right Ear: External ear normal.      Left Ear: External ear normal.      Nose: Nose normal.      Mouth/Throat:      Mouth: Mucous membranes are moist.      Comments: Minimal drainage noted on dressing at right side of patient's mouth  Eyes:      Extraocular Movements: Extraocular movements intact.   Cardiovascular:      Rate and Rhythm: Normal rate and regular rhythm.      Pulses: Normal pulses.      Heart sounds: Normal heart sounds.   Pulmonary:      Effort: Pulmonary effort is normal.      Breath sounds: Normal breath sounds.   Abdominal:      General: Bowel sounds are normal. There is no distension.      Tenderness: There is no abdominal tenderness.   Musculoskeletal:         General: Normal range of motion.      Cervical back: Normal range of motion.   Skin:     General: Skin is warm and dry.   Neurological:      General: No focal deficit present.      Mental Status: She is alert and oriented to person, place, and time.   Psychiatric:         Mood and Affect: Mood normal.         Behavior: Behavior normal.         Thought Content: Thought content normal.         Judgment: Judgment normal.           Results Review:  I have personally reviewed most recent cardiac tracings and lab  results and agree with findings, most notably: CBC, CMP, EKG and COVID-19 test.    Results from last 7 days   Lab Units 04/06/21  1327   WBC 10*3/mm3 13.70*   HEMOGLOBIN g/dL 13.3   HEMATOCRIT % 39.8   PLATELETS 10*3/mm3 240     Results from last 7 days   Lab Units 04/06/21  1327   SODIUM mmol/L 141   POTASSIUM mmol/L 3.4*   CHLORIDE mmol/L 95*   CO2 mmol/L 32.0*   BUN mg/dL 27*   CREATININE mg/dL 1.19*   GLUCOSE mg/dL 104*   CALCIUM mg/dL 9.4   ALT (SGPT) U/L 107*   AST (SGOT) U/L 59*     Estimated Creatinine Clearance: 50.1 mL/min (A) (by C-G formula based on SCr of 1.19 mg/dL (H)).  Brief Urine Lab Results  (Last result in the past 365 days)      Color   Clarity   Blood   Leuk Est   Nitrite   Protein   CREAT   Urine HCG        11/13/20 2121 Yellow Clear Negative Negative Negative Negative               Microbiology Results (last 10 days)     Procedure Component Value - Date/Time    COVID-19,CEPHEID,COR/POLLO/PAD IN-HOUSE(OR EMERGENT/ADD-ON),NP SWAB IN TRANSPORT MEDIA 3-4 HR TAT, RT-PCR - Swab, Nasopharynx [[id number]]  (Normal) Collected: 04/06/21 1323    Lab Status: Final result Specimen: Swab from Nasopharynx Updated: 04/06/21 1359     COVID19 Not Detected    Narrative:      Fact sheet for providers: https://www.fda.gov/media/477250/download     Fact sheet for patients: https://www.fda.gov/media/862658/download          ECG/EMG Results (most recent)     Procedure Component Value Units Date/Time    ECG 12 Lead [[id number]] Collected: 04/06/21 1409     Updated: 04/06/21 1411     QT Interval 430 ms     Narrative:      HEART RATE= 72  bpm  RR Interval= 828  ms  OK Interval= 184  ms  P Horizontal Axis= -45  deg  P Front Axis= -51  deg  QRSD Interval= 78  ms  QT Interval= 430  ms  QRS Axis= -60  deg  T Wave Axis= 116  deg  - ABNORMAL ECG -  Sinus or ectopic atrial rhythm  Probable left atrial enlargement  Left anterior fascicular block  Abnormal T, consider ischemia, lateral leads  Electronically Signed By:   Date and  Time of Study: 2021-04-06 14:09:58                  No radiology results for the last 7 days      Estimated Creatinine Clearance: 50.1 mL/min (A) (by C-G formula based on SCr of 1.19 mg/dL (H)).    Assessment/Plan   Assessment/Plan       Active Hospital Problems    Diagnosis  POA   • **Abscess of face [L02.01]  Unknown     Priority: High   • Essential hypertension [I10]  Yes     Priority: Medium   • Hypokalemia [E87.6]  Yes     Priority: Medium   • Elevated serum creatinine [R79.89]  Yes     Priority: Medium   • Hyperlipidemia [E78.5]  Yes     Priority: Low   • Hypothyroidism (acquired) [E03.9]  Yes     Priority: Low      Resolved Hospital Problems   No resolved problems to display.     Abscess of face  -Patient presented for incision and drainage following identification of a large abscess on the right side of her face  -Incision and drainage performed on 4/6/2021 with surgeon noting need for continued hospitalization IV antibiotics due to the severe nature of her abscess.  No complications reported during procedure and blood loss noted is minimal.  -Patient was given 1 dose of Zosyn, infectious disease following and has ordered cefazolin and linezolid  -Pain and antipyretics management ordered by surgery    Elevated serum creatinine  - Creatine: 1.19, BUN: 27, BUN/creatinine ratio: 22.7, (Baseline creatinine: 0.89)  - Hold lisinopril, hydrochlorothiazide and meloxicam  -Continue saline at 100 mils per hour  -Avoid nephrotoxic medication and IV dye unless urgently needed  -Monitor BMP and I's and O's while admitted    Hypokalemia  -Potassium mildly decreased at 3.4  -Check magnesium  -Replacement protocol ordered    Hypertension  -Well controlled currently with most recent blood pressure of 127/73 though patient has been hypertensive through the majority of her admission  -Hold lisinopril-hydrochlorothiazide as above  -Labetalol as needed  - Monitor while admitted    Hyperlipidemia  -Check lipid panel  -Continue  statin    Depression  -Hold Lexapro while on linezolid    Hypothyroidism  -Check TSH  -Continue levothyroxine            VTE Prophylaxis -SCDs  Mechanical Order History:      Ordered        04/06/21 1843  Place Sequential Compression Device  Once         04/06/21 1843  Place Sequential Compression Device  Once         04/06/21 1843  Maintain Sequential Compression Device  Continuous                 Pharmalogical Order History:     None          CODE STATUS: Full  Code Status and Medical Interventions:   Ordered at: 04/06/21 1843     Code Status:    CPR     Medical Interventions (Level of Support Prior to Arrest):    Full         I discussed the patient's findings and my recommendations with patient.      Signature:Electronically signed by Raman Schaffer PA-C, 04/06/21, 11:51 PM EDT.    [facility] Hospitalist Team

## 2021-04-07 NOTE — PLAN OF CARE
Goal Outcome Evaluation:  Plan of Care Reviewed With: patient  Progress: improving  Outcome Summary: Seen by Dr Haley for Dr Hernandes today, dressing removed and Dr Haley said ok to leave open to air. Agnes Hines from ID saw and anticipates final wound cx tomorrow, hoping to be able to d/c home on po Bactrim, VSS cont to monitor

## 2021-04-07 NOTE — PROGRESS NOTES
S/p I and D of face abscess yesterday    AFVSS        Swelling is improving.       A/p  -Await cultures, ID is following. Likely home tomorrow.

## 2021-04-07 NOTE — PLAN OF CARE
Problem: Adult Inpatient Plan of Care  Goal: Plan of Care Review  Outcome: Ongoing, Progressing  Flowsheets (Taken 4/7/2021 0257)  Progress: improving  Plan of Care Reviewed With: patient  Outcome Summary: new admit,  Pt had surgery yesterday.  Currently on IV antibiotics  VSS  Will continue to monitor  Goal: Patient-Specific Goal (Individualized)  Outcome: Ongoing, Progressing  Goal: Absence of Hospital-Acquired Illness or Injury  Outcome: Ongoing, Progressing  Intervention: Identify and Manage Fall Risk  Recent Flowsheet Documentation  Taken 4/6/2021 2343 by Marcy Hines RN  Safety Promotion/Fall Prevention:   safety round/check completed   room organization consistent   nonskid shoes/slippers when out of bed   lighting adjusted   clutter free environment maintained   activity supervised   assistive device/personal items within reach  Taken 4/6/2021 1936 by Marcy Hines RN  Safety Promotion/Fall Prevention:   safety round/check completed   room organization consistent   nonskid shoes/slippers when out of bed   lighting adjusted   assistive device/personal items within reach   activity supervised   clutter free environment maintained  Intervention: Prevent Skin Injury  Recent Flowsheet Documentation  Taken 4/6/2021 2343 by Marcy Hines, RN  Body Position: position changed independently  Taken 4/6/2021 1936 by Marcy Hines RN  Body Position: position changed independently  Intervention: Prevent Infection  Recent Flowsheet Documentation  Taken 4/6/2021 2343 by Marcy Hnies RN  Infection Prevention:   visitors restricted/screened   single patient room provided   rest/sleep promoted   personal protective equipment utilized   hand hygiene promoted   equipment surfaces disinfected  Taken 4/6/2021 1936 by Marcy Hines RN  Infection Prevention:   visitors restricted/screened   single patient room provided   personal protective equipment utilized   rest/sleep promoted   hand  hygiene promoted   equipment surfaces disinfected  Goal: Optimal Comfort and Wellbeing  Outcome: Ongoing, Progressing  Intervention: Provide Person-Centered Care  Recent Flowsheet Documentation  Taken 4/6/2021 1936 by Marcy Hines RN  Trust Relationship/Rapport:   care explained   questions answered   questions encouraged   reassurance provided   thoughts/feelings acknowledged  Goal: Readiness for Transition of Care  Outcome: Ongoing, Progressing  Intervention: Mutually Develop Transition Plan  Recent Flowsheet Documentation  Taken 4/6/2021 1938 by Marcy Hines, RN  Transportation Anticipated:   car, drives self   family or friend will provide  Patient/Family Anticipated Services at Transition: none  Patient/Family Anticipates Transition to: home with family  Taken 4/6/2021 1935 by Marcy Hines, RN  Equipment Currently Used at Home: none     Problem: Pain Acute  Goal: Optimal Pain Control  Outcome: Ongoing, Progressing  Intervention: Prevent or Manage Pain  Recent Flowsheet Documentation  Taken 4/6/2021 2343 by Marcy Hines, RN  Sleep/Rest Enhancement:   awakenings minimized   noise level reduced   regular sleep/rest pattern promoted  Taken 4/6/2021 1936 by Marcy Hines RN  Sensory Stimulation Regulation: auditory stimulation minimized  Sleep/Rest Enhancement:   awakenings minimized   noise level reduced   regular sleep/rest pattern promoted  Intervention: Optimize Psychosocial Wellbeing  Recent Flowsheet Documentation  Taken 4/6/2021 2343 by Marcy Hines RN  Supportive Measures: active listening utilized  Diversional Activities: smartphone  Taken 4/6/2021 1936 by Marcy Hines, RN  Supportive Measures: active listening utilized  Diversional Activities: smartphone     Problem: Infection (Surgery Nonspecified)  Goal: Absence of Infection Signs and Symptoms  Outcome: Ongoing, Progressing     Problem: Pain (Surgery Nonspecified)  Goal: Acceptable Pain Control  Outcome:  Ongoing, Progressing  Intervention: Prevent or Manage Pain  Recent Flowsheet Documentation  Taken 4/6/2021 2343 by Marcy Hines, RN  Diversional Activities: smartphone  Taken 4/6/2021 1936 by Marcy Hines, RN  Diversional Activities: smartphone   Goal Outcome Evaluation:  Plan of Care Reviewed With: patient  Progress: improving  Outcome Summary: new admit,  Pt had surgery yesterday.  Currently on IV antibiotics  VSS  Will continue to monitor

## 2021-04-08 LAB
ANION GAP SERPL CALCULATED.3IONS-SCNC: 7 MMOL/L (ref 5–15)
BASOPHILS # BLD AUTO: 0.1 10*3/MM3 (ref 0–0.2)
BASOPHILS NFR BLD AUTO: 0.9 % (ref 0–1.5)
BUN SERPL-MCNC: 18 MG/DL (ref 8–23)
BUN/CREAT SERPL: 18 (ref 7–25)
CALCIUM SPEC-SCNC: 8.5 MG/DL (ref 8.6–10.5)
CHLORIDE SERPL-SCNC: 99 MMOL/L (ref 98–107)
CO2 SERPL-SCNC: 32 MMOL/L (ref 22–29)
CREAT SERPL-MCNC: 1 MG/DL (ref 0.57–1)
DEPRECATED RDW RBC AUTO: 44.2 FL (ref 37–54)
EOSINOPHIL # BLD AUTO: 0.2 10*3/MM3 (ref 0–0.4)
EOSINOPHIL NFR BLD AUTO: 2.3 % (ref 0.3–6.2)
ERYTHROCYTE [DISTWIDTH] IN BLOOD BY AUTOMATED COUNT: 14.2 % (ref 12.3–15.4)
GFR SERPL CREATININE-BSD FRML MDRD: 56 ML/MIN/1.73
GLUCOSE SERPL-MCNC: 113 MG/DL (ref 65–99)
HCT VFR BLD AUTO: 39.5 % (ref 34–46.6)
HGB BLD-MCNC: 13.1 G/DL (ref 12–15.9)
LYMPHOCYTES # BLD AUTO: 2.4 10*3/MM3 (ref 0.7–3.1)
LYMPHOCYTES NFR BLD AUTO: 30.3 % (ref 19.6–45.3)
MAGNESIUM SERPL-MCNC: 2.1 MG/DL (ref 1.6–2.4)
MCH RBC QN AUTO: 29.8 PG (ref 26.6–33)
MCHC RBC AUTO-ENTMCNC: 33.2 G/DL (ref 31.5–35.7)
MCV RBC AUTO: 89.6 FL (ref 79–97)
MONOCYTES # BLD AUTO: 0.8 10*3/MM3 (ref 0.1–0.9)
MONOCYTES NFR BLD AUTO: 9.7 % (ref 5–12)
NEUTROPHILS NFR BLD AUTO: 4.6 10*3/MM3 (ref 1.7–7)
NEUTROPHILS NFR BLD AUTO: 56.8 % (ref 42.7–76)
NRBC BLD AUTO-RTO: 0.1 /100 WBC (ref 0–0.2)
PLATELET # BLD AUTO: 232 10*3/MM3 (ref 140–450)
PMV BLD AUTO: 9.6 FL (ref 6–12)
POTASSIUM SERPL-SCNC: 3.7 MMOL/L (ref 3.5–5.2)
RBC # BLD AUTO: 4.41 10*6/MM3 (ref 3.77–5.28)
SODIUM SERPL-SCNC: 138 MMOL/L (ref 136–145)
WBC # BLD AUTO: 8 10*3/MM3 (ref 3.4–10.8)

## 2021-04-08 PROCEDURE — 80048 BASIC METABOLIC PNL TOTAL CA: CPT | Performed by: NURSE PRACTITIONER

## 2021-04-08 PROCEDURE — 25010000002 LINEZOLID 600 MG/300ML SOLUTION: Performed by: INTERNAL MEDICINE

## 2021-04-08 PROCEDURE — 85025 COMPLETE CBC W/AUTO DIFF WBC: CPT | Performed by: NURSE PRACTITIONER

## 2021-04-08 PROCEDURE — G0378 HOSPITAL OBSERVATION PER HR: HCPCS

## 2021-04-08 PROCEDURE — 99225 PR SBSQ OBSERVATION CARE/DAY 25 MINUTES: CPT | Performed by: INTERNAL MEDICINE

## 2021-04-08 PROCEDURE — 83735 ASSAY OF MAGNESIUM: CPT | Performed by: PHYSICIAN ASSISTANT

## 2021-04-08 PROCEDURE — 99024 POSTOP FOLLOW-UP VISIT: CPT | Performed by: SURGERY

## 2021-04-08 RX ORDER — IBUPROFEN 200 MG
CAPSULE ORAL 3 TIMES DAILY
Status: DISCONTINUED | OUTPATIENT
Start: 2021-04-08 | End: 2021-04-09 | Stop reason: HOSPADM

## 2021-04-08 RX ORDER — COLESEVELAM 180 1/1
3750 TABLET ORAL DAILY PRN
Status: DISCONTINUED | OUTPATIENT
Start: 2021-04-08 | End: 2021-04-09 | Stop reason: HOSPADM

## 2021-04-08 RX ADMIN — LINEZOLID 600 MG: 600 INJECTION, SOLUTION INTRAVENOUS at 08:31

## 2021-04-08 RX ADMIN — ATORVASTATIN CALCIUM 10 MG: 10 TABLET, FILM COATED ORAL at 08:36

## 2021-04-08 RX ADMIN — LISINOPRIL 10 MG: 5 TABLET ORAL at 08:36

## 2021-04-08 RX ADMIN — Medication 1 APPLICATION: at 20:49

## 2021-04-08 RX ADMIN — LINEZOLID 600 MG: 600 INJECTION, SOLUTION INTRAVENOUS at 20:49

## 2021-04-08 RX ADMIN — FAMOTIDINE 20 MG: 10 INJECTION INTRAVENOUS at 08:36

## 2021-04-08 RX ADMIN — Medication 1 APPLICATION: at 14:26

## 2021-04-08 RX ADMIN — LEVOTHYROXINE SODIUM 25 MCG: 0.03 TABLET ORAL at 05:35

## 2021-04-08 RX ADMIN — FAMOTIDINE 20 MG: 10 INJECTION INTRAVENOUS at 20:48

## 2021-04-08 NOTE — PROGRESS NOTES
S/p I and D of face abscess 2 days ago    AFVSS        Swelling is improving.       A/p  -Penrose drain has been removed.  Will cleanse the area with peroxide and apply bacitracin.  Okay to discharge from my standpoint she can follow-up with Cecilio in 1 week.

## 2021-04-08 NOTE — PLAN OF CARE
Problem: Adult Inpatient Plan of Care  Goal: Plan of Care Review  Outcome: Ongoing, Progressing  Flowsheets  Taken 4/8/2021 0118 by Marcy Hines RN  Progress: improving  Outcome Summary: Pt is continuing toimprove.  She has not asked for any pain medication.  Pt does have a small amount of drainage from the incision.  VSS Will continue to monitor  Taken 4/7/2021 1611 by Clarissa Mercado RN  Plan of Care Reviewed With: patient  Goal: Patient-Specific Goal (Individualized)  Outcome: Ongoing, Progressing  Goal: Absence of Hospital-Acquired Illness or Injury  Outcome: Ongoing, Progressing  Intervention: Identify and Manage Fall Risk  Recent Flowsheet Documentation  Taken 4/7/2021 2315 by Marcy Hines RN  Safety Promotion/Fall Prevention:   safety round/check completed   room organization consistent   nonskid shoes/slippers when out of bed   lighting adjusted   assistive device/personal items within reach   clutter free environment maintained   activity supervised  Taken 4/7/2021 1945 by Marcy Hines RN  Safety Promotion/Fall Prevention:   safety round/check completed   room organization consistent   nonskid shoes/slippers when out of bed   lighting adjusted   clutter free environment maintained   assistive device/personal items within reach   activity supervised  Intervention: Prevent Skin Injury  Recent Flowsheet Documentation  Taken 4/7/2021 2315 by Marcy Hines RN  Body Position: position changed independently  Taken 4/7/2021 1945 by Marcy Hines, RN  Body Position: position changed independently  Intervention: Prevent Infection  Recent Flowsheet Documentation  Taken 4/7/2021 2315 by Marcy Hines RN  Infection Prevention:   visitors restricted/screened   single patient room provided   rest/sleep promoted   personal protective equipment utilized   hand hygiene promoted   equipment surfaces disinfected  Taken 4/7/2021 1945 by Marcy Hines RN  Infection  Prevention:   visitors restricted/screened   single patient room provided   rest/sleep promoted   personal protective equipment utilized   hand hygiene promoted   equipment surfaces disinfected  Goal: Optimal Comfort and Wellbeing  Outcome: Ongoing, Progressing  Intervention: Provide Person-Centered Care  Recent Flowsheet Documentation  Taken 4/7/2021 1945 by Marcy Hines, RN  Trust Relationship/Rapport:   care explained   questions answered   questions encouraged   reassurance provided   thoughts/feelings acknowledged  Goal: Readiness for Transition of Care  Outcome: Ongoing, Progressing     Problem: Pain Acute  Goal: Optimal Pain Control  Outcome: Ongoing, Progressing  Intervention: Prevent or Manage Pain  Recent Flowsheet Documentation  Taken 4/7/2021 1945 by Marcy Hines RN  Sensory Stimulation Regulation:   auditory stimulation minimized   lighting decreased   quiet environment promoted  Sleep/Rest Enhancement:   awakenings minimized   noise level reduced   regular sleep/rest pattern promoted  Intervention: Optimize Psychosocial Wellbeing  Recent Flowsheet Documentation  Taken 4/7/2021 1945 by Marcy Hines, RN  Supportive Measures:   active listening utilized   self-care encouraged   self-reflection promoted   self-responsibility promoted  Diversional Activities:   television   smartphone     Problem: Bleeding (Surgery Nonspecified)  Goal: Absence of Bleeding  Outcome: Ongoing, Progressing     Problem: Infection (Surgery Nonspecified)  Goal: Absence of Infection Signs and Symptoms  Outcome: Ongoing, Progressing     Problem: Pain (Surgery Nonspecified)  Goal: Acceptable Pain Control  Outcome: Ongoing, Progressing  Intervention: Prevent or Manage Pain  Recent Flowsheet Documentation  Taken 4/7/2021 1945 by Marcy Hines RN  Diversional Activities:   television   smartphone   Goal Outcome Evaluation:  Plan of Care Reviewed With: patient  Progress: improving  Outcome Summary: Pt is  continuing toimprove.  She has not asked for any pain medication.  Pt does have a small amount of drainage from the incision.  VSS Will continue to monitor

## 2021-04-08 NOTE — PROGRESS NOTES
Continued Stay Note  TARA Mendieta     Patient Name: Joi Cheng  MRN: [id number]  Today's Date: 4/8/2021    Admit Date: 4/6/2021    Discharge Plan     Row Name 04/08/21 1706       Plan    Plan  DC Plan: Anticipate routine home with spouse.    Plan Comments  Per ID, ARLINE to obtain Zyvox approval. CM contacted patient’s pharmacy, [facility] ([phone]) to test claim medication. Per pharmacist, prior authorization not required and cost would be $10 for Zyvox 600 mg po BID for 12 more days. CM advised pharmacist to cancel prescription as it will be e-prescribed over once final dosing and days left are known. Anticiapte dc home possible tomorrow on po Zyvox pending ID recommendations after final cultures.        Phone communication or documentation only - no physical contact with patient or family.    Lorena Gomez RN     Office Phone: [phone]  Office Cell: [phone]

## 2021-04-08 NOTE — PLAN OF CARE
Goal Outcome Evaluation:  Plan of Care Reviewed With: patient  Progress: improving  Outcome Summary: drain out, incision cleansed with H2O2 and covered with guaze per Dr Hlaey instruction, wound growing MRSA, contact isolation maintained, await sensitivity, anticipate discharge home tomorrow on oral abx, VSS cont to monitor

## 2021-04-08 NOTE — PROGRESS NOTES
"      [facility] Medicine Services Daily Progress Note      Hospitalist Team  LOS 0 days      Patient Care Team:  Ariel Rider MD as PCP - General    Patient Location: 106/1      Subjective   Subjective     Chief Complaint / Subjective  No chief complaint on file.  Follow-up facial abscess      Brief Synopsis of Hospital Course/HPI  Was admitted postoperatively by surgery after having an abscess drainage of her right side of her face underneath her chin.      Date::    4/7/2021: Reports improvement in pain after debridement.  No fevers  4/8/2021: Reports improvement in pain no drainage no fevers    Review of Systems   Constitutional: Negative for fever.   Skin: Positive for poor wound healing.         Objective   Objective      Vital Signs  Temp:  [97.3 °F (36.3 °C)-98.2 °F (36.8 °C)] 97.5 °F (36.4 °C)  Heart Rate:  [60-69] 64  Resp:  [16-18] 18  BP: (136-160)/(68-80) 153/68  Oxygen Therapy  SpO2: 96 %  Pulse Oximetry Type: Intermittent  Device (Oxygen Therapy): room air  Flow (L/min): 2  Flowsheet Rows      First Filed Value   Admission Height  165.1 cm (65\") Documented at 04/06/2021 1344   Admission Weight  79.3 kg (174 lb 12.8 oz) Documented at 04/06/2021 1344        Intake & Output (last 3 days)       04/05 0701 - 04/06 0700 04/06 0701 - 04/07 0700 04/07 0701 - 04/08 0700 04/08 0701 - 04/09 0700    P.O.  350 758 920    I.V. (mL/kg)  2000 (24.8)      Total Intake(mL/kg)  2350 (29.2) 758 (9.5) 920 (11.5)    Net  +2350 +758 +920            Urine Unmeasured Occurrence   4 x         Lines, Drains & Airways    Active LDAs     Name:   Placement date:   Placement time:   Site:   Days:    Peripheral IV 04/06/21 1350 Anterior;Left;Upper Arm   04/06/21    1350    Arm   1    Open Drain Right Other (Comment)   04/06/21    1701    Other (Comment) face   less than 1                  Physical Exam:    Physical Exam  Vitals reviewed.   Constitutional:       General: She is not in acute distress.  Neck:      " Comments: Area of swelling to the right cheek with Penrose drain in place no obvious serosanguineous drainage  Pulmonary:      Effort: Pulmonary effort is normal. No respiratory distress.   Abdominal:      General: There is no distension.      Palpations: Abdomen is soft.   Skin:     General: Skin is warm and dry.   Neurological:      Mental Status: She is alert and oriented to person, place, and time.   Psychiatric:         Behavior: Behavior normal.              Wounds (last 24 hours)      LDA Wound     Row Name 04/08/21 1500 04/08/21 1100 04/08/21 0707       Wound 04/06/21 1656 Right cheek Abcess    Wound - Properties Group Placement Date: 04/06/21 -JY Placement Time: 1656 -JY Present on Hospital Admission: Y  -JY Side: Right  -JY Location: cheek  -JY Primary Wound Type: Abcess  -JY    Dressing Appearance  no drainage  -CH  open to air  -CH  open to air  -CH    Closure  None  -CH  Open to air  -CH  Open to air  -CH    Base  red  -CH  red  -CH  red  -CH    Periwound  swelling;redness  -CH  swelling;redness  -CH  swelling;redness  -CH    Periwound Temperature  warm  -CH  warm  -CH  warm  -CH    Drainage Characteristics/Odor  bleeding controlled  -CH  bleeding controlled  -CH  bleeding controlled  -CH    Drainage Amount  none  -CH  none  -CH  none  -CH    Care, Wound  cleansed with;other (see comments) peroxide  -CH  --  --    Dressing Care  gauze  -CH  open to air  -CH  open to air  -CH    Periwound Care  dry periwound area maintained  -CH  dry periwound area maintained  -CH  dry periwound area maintained  -CH    Retired Wound - Properties Group Date first assessed: 04/06/21 -JY Time first assessed: 1656 -JY Present on Hospital Admission: Y  -JSHIVANI Side: Right  -JY Location: cheek  -JY Primary Wound Type: Abcess  -JY    Row Name 04/07/21 1945             Wound 04/06/21 1656 Right cheek Abcess    Wound - Properties Group Placement Date: 04/06/21 -JY Placement Time: 1656 -JY Present on Hospital Admission: SHIVANI ABRAHAM  Side: Right  -JY Location: cheek  -JY Primary Wound Type: Abcess  -JY    Dressing Appearance  open to air  -      Periwound  swelling;redness  -      Periwound Temperature  warm  -      Drainage Characteristics/Odor  bleeding controlled  -      Drainage Amount  none  -      Retired Wound - Properties Group Date first assessed: 04/06/21 -JY Time first assessed: 1656 -JY Present on Hospital Admission: Y  -JY Side: Right  -JY Location: cheek  -JY Primary Wound Type: Abcess  -JY      User Key  (r) = Recorded By, (t) = Taken By, (c) = Cosigned By    Initials Name Provider Type    Nova Lockett, RN Registered Nurse    Clarissa Enamorado, RN Registered Nurse    Marcy Blanchard RN Registered Nurse          Procedures:    Procedure(s):  Incision and drainage of abscess of right face/cheek          Results Review:     I reviewed the patient's new clinical results.      Lab Results (last 24 hours)     Procedure Component Value Units Date/Time    Wound Culture - Wound, Face [[id number]]  (Abnormal) Collected: 04/06/21 1712    Specimen: Wound from Face Updated: 04/08/21 1106     Wound Culture Moderate growth (3+) Staphylococcus aureus, MRSA     Comment: Methicillin resistant Staphylococcus aureus, Patient may be an isolation risk.        Gram Stain Rare (1+) Gram positive cocci in clusters      Many (4+) WBCs per low power field    Basic Metabolic Panel [[id number]]  (Abnormal) Collected: 04/08/21 0455    Specimen: Blood Updated: 04/08/21 0546     Glucose 113 mg/dL      BUN 18 mg/dL      Creatinine 1.00 mg/dL      Sodium 138 mmol/L      Potassium 3.7 mmol/L      Comment: Slight hemolysis detected by analyzer. Results may be affected.        Chloride 99 mmol/L      CO2 32.0 mmol/L      Calcium 8.5 mg/dL      eGFR Non African Amer 56 mL/min/1.73      BUN/Creatinine Ratio 18.0     Anion Gap 7.0 mmol/L     Narrative:      GFR Normal >60  Chronic Kidney Disease <60  Kidney Failure <15      Magnesium  [[id number]]  (Normal) Collected: 04/08/21 0455    Specimen: Blood Updated: 04/08/21 0546     Magnesium 2.1 mg/dL     CBC & Differential [[id number]]  (Normal) Collected: 04/08/21 0455    Specimen: Blood Updated: 04/08/21 0510    Narrative:      The following orders were created for panel order CBC & Differential.  Procedure                               Abnormality         Status                     ---------                               -----------         ------                     CBC Auto Differential[[id number]]        Normal              Final result                 Please view results for these tests on the individual orders.    CBC Auto Differential [[id number]]  (Normal) Collected: 04/08/21 0455    Specimen: Blood Updated: 04/08/21 0510     WBC 8.00 10*3/mm3      RBC 4.41 10*6/mm3      Hemoglobin 13.1 g/dL      Hematocrit 39.5 %      MCV 89.6 fL      MCH 29.8 pg      MCHC 33.2 g/dL      RDW 14.2 %      RDW-SD 44.2 fl      MPV 9.6 fL      Platelets 232 10*3/mm3      Neutrophil % 56.8 %      Lymphocyte % 30.3 %      Monocyte % 9.7 %      Eosinophil % 2.3 %      Basophil % 0.9 %      Neutrophils, Absolute 4.60 10*3/mm3      Lymphocytes, Absolute 2.40 10*3/mm3      Monocytes, Absolute 0.80 10*3/mm3      Eosinophils, Absolute 0.20 10*3/mm3      Basophils, Absolute 0.10 10*3/mm3      nRBC 0.1 /100 WBC     Blood Culture - Blood, Arm, Right [[id number]] Collected: 04/06/21 2105    Specimen: Blood from Arm, Right Updated: 04/07/21 2116     Blood Culture No growth at 24 hours    Blood Culture - Blood, Hand, Right [[id number]] Collected: 04/06/21 2105    Specimen: Blood from Hand, Right Updated: 04/07/21 2116     Blood Culture No growth at 24 hours        No results found for: HGBA1C            Lab Results   Component Value Date    LIPASE 56 11/13/2020     Lab Results   Component Value Date    CHOL 119 04/07/2021    TRIG 82 04/07/2021    HDL 40 04/07/2021    LDL 63 04/07/2021       No results found for: INTRAOP, PREDX,  FINALDX, COMDX    Microbiology Results (last 10 days)     Procedure Component Value - Date/Time    Blood Culture - Blood, Arm, Right [[id number]] Collected: 04/06/21 2105    Lab Status: Preliminary result Specimen: Blood from Arm, Right Updated: 04/07/21 2116     Blood Culture No growth at 24 hours    Blood Culture - Blood, Hand, Right [[id number]] Collected: 04/06/21 2105    Lab Status: Preliminary result Specimen: Blood from Hand, Right Updated: 04/07/21 2116     Blood Culture No growth at 24 hours    Wound Culture - Wound, Face [[id number]]  (Abnormal) Collected: 04/06/21 1712    Lab Status: Preliminary result Specimen: Wound from Face Updated: 04/08/21 1106     Wound Culture Moderate growth (3+) Staphylococcus aureus, MRSA     Comment: Methicillin resistant Staphylococcus aureus, Patient may be an isolation risk.        Gram Stain Rare (1+) Gram positive cocci in clusters      Many (4+) WBCs per low power field    COVID-19,CEPHEID,COR/POLLO/PAD IN-HOUSE(OR EMERGENT/ADD-ON),NP SWAB IN TRANSPORT MEDIA 3-4 HR TAT, RT-PCR - Swab, Nasopharynx [[id number]]  (Normal) Collected: 04/06/21 1323    Lab Status: Final result Specimen: Swab from Nasopharynx Updated: 04/06/21 1359     COVID19 Not Detected    Narrative:      Fact sheet for providers: https://www.fda.gov/media/815597/download     Fact sheet for patients: https://www.fda.gov/media/220735/download          ECG/EMG Results (most recent)     Procedure Component Value Units Date/Time    ECG 12 Lead [[id number]] Collected: 04/06/21 1409     Updated: 04/06/21 1411     QT Interval 430 ms     Narrative:      HEART RATE= 72  bpm  RR Interval= 828  ms  FL Interval= 184  ms  P Horizontal Axis= -45  deg  P Front Axis= -51  deg  QRSD Interval= 78  ms  QT Interval= 430  ms  QRS Axis= -60  deg  T Wave Axis= 116  deg  - ABNORMAL ECG -  Sinus or ectopic atrial rhythm  Probable left atrial enlargement  Left anterior fascicular block  Abnormal T, consider ischemia, lateral  leads  Electronically Signed By:   Date and Time of Study: 2021-04-06 14:09:58                  No radiology results for the last 7 days        Xrays, labs reviewed personally by physician.    Medication Review:   I have reviewed the patient's current medication list      Scheduled Meds  atorvastatin, 10 mg, Oral, Daily  famotidine, 20 mg, Intravenous, BID  levothyroxine, 25 mcg, Oral, Q AM  Linezolid, 600 mg, Intravenous, Q12H  lisinopril, 10 mg, Oral, Q24H  neomycin-bacitracin-polymyxin b, , Topical, TID        Meds Infusions  sodium chloride, 100 mL/hr, Last Rate: Stopped (04/07/21 1718)        Meds PRN  •  acetaminophen **OR** acetaminophen  •  colesevelam  •  HYDROcodone-acetaminophen  •  HYDROmorphone **AND** naloxone  •  labetalol  •  Morphine **AND** naloxone  •  ondansetron **OR** ondansetron  •  oxyCODONE  •  potassium chloride **OR** potassium chloride **OR** potassium chloride  •  promethazine **OR** promethazine        Assessment/Plan   Assessment/Plan     Active Hospital Problems    Diagnosis  POA   • **Abscess of face [L02.01]  Unknown   • Hyperlipidemia [E78.5]  Yes   • Essential hypertension [I10]  Yes   • Hypothyroidism (acquired) [E03.9]  Yes   • Hypokalemia [E87.6]  Yes   • Elevated serum creatinine [R79.89]  Yes      Resolved Hospital Problems   No resolved problems to display.       MEDICAL DECISION MAKING COMPLEXITY BY PROBLEM:     Abscess of face  -s/p I and D facial abscess or 4/6/21  -General surgery removed Penrose drain signed off, follow-up 1 week  -wound Cx 4/6/21: MRSA awaiting final sensitivity  -on IV Zyvox awaiting final sensitivity  -ID following appreciate recs     CKD stage II  -C creatinine stable follow     Hypokalemia  -Replace and monitor per protocol     Hypertension  -Continue lisinopril     Hyperlipidemia  -Check lipid panel  -Continue statin     Depression  -Hold Lexapro while on linezolid     Hypothyroidism  -Check TSH  -Continue levothyroxine    VTE Prophylaxis -    Mechanical Order History:      Ordered        04/06/21 1843  Place Sequential Compression Device  Once         04/06/21 1843  Place Sequential Compression Device  Once         04/06/21 1843  Maintain Sequential Compression Device  Continuous                 Pharmalogical Order History:     None            Code Status -   Code Status and Medical Interventions:   Ordered at: 04/06/21 1843     Code Status:    CPR     Medical Interventions (Level of Support Prior to Arrest):    Full       This patient has been examined wearing appropriate Personal Protective Equipment. 04/08/21        Discharge Planning  Likely tomorrow once cultures finalized        Electronically signed by Abdoul Win DO, 04/08/21, 15:53 EDT.  [facility] Hospitalist Team

## 2021-04-08 NOTE — PROGRESS NOTES
Infectious Diseases Progress Note      LOS: 0 days   Patient Care Team:  Ariel Rider MD as PCP - General    Chief Complaint: Right facial pain    Subjective       The patient had no fever during the last 24 hours.  The patient remained hemodynamically stable.  The patient did not have any new complaints today.  Review of Systems:   Review of Systems   Constitutional: Negative.    HENT: Negative.    Eyes: Negative.    Respiratory: Negative.    Cardiovascular: Negative.    Gastrointestinal: Negative.    Genitourinary: Negative.    Musculoskeletal: Negative.    Skin: Positive for wound.   Neurological: Negative.    Hematological: Negative.    Psychiatric/Behavioral: Negative.         Objective     Vital Signs  Temp:  [97.3 °F (36.3 °C)-98.2 °F (36.8 °C)] 97.5 °F (36.4 °C)  Heart Rate:  [60-69] 64  Resp:  [16-18] 18  BP: (136-160)/(68-80) 153/68    Physical Exam:  Physical Exam  Vitals and nursing note reviewed.   Constitutional:       Appearance: She is well-developed.   HENT:      Head: Normocephalic and atraumatic.      Mouth/Throat:      Comments: S/p right facial incision with no significant fluctuance or erythema.  Patient has mild induration  Eyes:      Pupils: Pupils are equal, round, and reactive to light.   Cardiovascular:      Rate and Rhythm: Normal rate and regular rhythm.      Heart sounds: Normal heart sounds.   Pulmonary:      Effort: Pulmonary effort is normal. No respiratory distress.      Breath sounds: Normal breath sounds. No wheezing or rales.   Abdominal:      General: Bowel sounds are normal. There is no distension.      Palpations: Abdomen is soft. There is no mass.      Tenderness: There is no abdominal tenderness. There is no guarding or rebound.   Musculoskeletal:         General: No deformity. Normal range of motion.      Cervical back: Normal range of motion and neck supple.   Skin:     General: Skin is warm.      Findings: No erythema or rash.   Neurological:      Mental Status: She is  alert and oriented to person, place, and time.      Cranial Nerves: No cranial nerve deficit.          Results Review:    I have reviewed all clinical data, test, lab, and imaging results.     Radiology  No Radiology Exams Resulted Within Past 24 Hours    Cardiology    Laboratory    Results from last 7 days   Lab Units 04/08/21  0455 04/07/21  0834 04/06/21  1327   WBC 10*3/mm3 8.00 9.90 13.70*   HEMOGLOBIN g/dL 13.1 12.3 13.3   HEMATOCRIT % 39.5 36.2 39.8   PLATELETS 10*3/mm3 232 226 240     Results from last 7 days   Lab Units 04/08/21  0455 04/07/21  0834 04/06/21  1327   SODIUM mmol/L 138 140 141   POTASSIUM mmol/L 3.7 3.7 3.4*   CHLORIDE mmol/L 99 98 95*   CO2 mmol/L 32.0* 33.0* 32.0*   BUN mg/dL 18 18 27*   CREATININE mg/dL 1.00 0.88 1.19*   GLUCOSE mg/dL 113* 127* 104*   ALBUMIN g/dL  --  3.50 4.10   BILIRUBIN mg/dL  --  0.4 0.4   ALK PHOS U/L  --  114 102   AST (SGOT) U/L  --  104* 59*   ALT (SGPT) U/L  --  143* 107*   CALCIUM mg/dL 8.5* 8.4* 9.4                 Microbiology   Microbiology Results (last 10 days)     Procedure Component Value - Date/Time    Blood Culture - Blood, Arm, Right [[id number]] Collected: 04/06/21 2105    Lab Status: Preliminary result Specimen: Blood from Arm, Right Updated: 04/07/21 2116     Blood Culture No growth at 24 hours    Blood Culture - Blood, Hand, Right [[id number]] Collected: 04/06/21 2105    Lab Status: Preliminary result Specimen: Blood from Hand, Right Updated: 04/07/21 2116     Blood Culture No growth at 24 hours    Wound Culture - Wound, Face [[id number]]  (Abnormal) Collected: 04/06/21 1712    Lab Status: Preliminary result Specimen: Wound from Face Updated: 04/08/21 1106     Wound Culture Moderate growth (3+) Staphylococcus aureus, MRSA     Comment: Methicillin resistant Staphylococcus aureus, Patient may be an isolation risk.        Gram Stain Rare (1+) Gram positive cocci in clusters      Many (4+) WBCs per low power field    COVID-19,CEPHEID,COR/POLLO/PAD  IN-HOUSE(OR EMERGENT/ADD-ON),NP SWAB IN TRANSPORT MEDIA 3-4 HR TAT, RT-PCR - Swab, Nasopharynx [[id number]]  (Normal) Collected: 04/06/21 1323    Lab Status: Final result Specimen: Swab from Nasopharynx Updated: 04/06/21 1359     COVID19 Not Detected    Narrative:      Fact sheet for providers: https://www.fda.gov/media/701706/download     Fact sheet for patients: https://www.fda.gov/media/429454/download          Medication Review:       Schedule Meds  atorvastatin, 10 mg, Oral, Daily  famotidine, 20 mg, Intravenous, BID  levothyroxine, 25 mcg, Oral, Q AM  Linezolid, 600 mg, Intravenous, Q12H  lisinopril, 10 mg, Oral, Q24H  neomycin-bacitracin-polymyxin b, , Topical, TID        Infusion Meds  sodium chloride, 100 mL/hr, Last Rate: Stopped (04/07/21 6183)        PRN Meds  •  acetaminophen **OR** acetaminophen  •  colesevelam  •  HYDROcodone-acetaminophen  •  HYDROmorphone **AND** naloxone  •  labetalol  •  Morphine **AND** naloxone  •  ondansetron **OR** ondansetron  •  oxyCODONE  •  potassium chloride **OR** potassium chloride **OR** potassium chloride  •  promethazine **OR** promethazine        Assessment/Plan       Antimicrobial Therapy   1.  Zyvox      day  2.      Day  3.      Day  4.      Day  5.      Day            Assessment     Right-sided facial abscess.    S/p I&D on April 6, 2021 and intraoperative cultures growing Staph aureus , probably MRSA and susceptibility is pending   Symptoms had improved       History of rheumatoid arthritis although patient is not on any immunosuppressive treatment           Plan     Continue IV Zyvox for now  Waiting on wound cultures  May discharge home tomorrow on p.o. Zyvox waiting on the final susceptibility  Recommend a total of 2 weeks of antimicrobial therapy, 12 more days  Consult  to obtain Zyvox approval           Annamaria Blum MD  04/08/21  16:04 EDT      Note is dictated utilizing voice recognition software/Dragon

## 2021-04-09 VITALS
OXYGEN SATURATION: 97 % | HEART RATE: 64 BPM | BODY MASS INDEX: 29.72 KG/M2 | HEIGHT: 65 IN | RESPIRATION RATE: 15 BRPM | DIASTOLIC BLOOD PRESSURE: 78 MMHG | SYSTOLIC BLOOD PRESSURE: 144 MMHG | WEIGHT: 178.37 LBS | TEMPERATURE: 98.3 F

## 2021-04-09 LAB
ANISOCYTOSIS BLD QL: ABNORMAL
BACTERIA SPEC AEROBE CULT: ABNORMAL
DEPRECATED RDW RBC AUTO: 44.2 FL (ref 37–54)
EOSINOPHIL # BLD MANUAL: 0.07 10*3/MM3 (ref 0–0.4)
EOSINOPHIL NFR BLD MANUAL: 1 % (ref 0.3–6.2)
ERYTHROCYTE [DISTWIDTH] IN BLOOD BY AUTOMATED COUNT: 14.1 % (ref 12.3–15.4)
GRAM STN SPEC: ABNORMAL
GRAM STN SPEC: ABNORMAL
HCT VFR BLD AUTO: 38.4 % (ref 34–46.6)
HGB BLD-MCNC: 13 G/DL (ref 12–15.9)
LYMPHOCYTES # BLD MANUAL: 3.43 10*3/MM3 (ref 0.7–3.1)
LYMPHOCYTES NFR BLD MANUAL: 3 % (ref 5–12)
LYMPHOCYTES NFR BLD MANUAL: 49 % (ref 19.6–45.3)
MCH RBC QN AUTO: 29.8 PG (ref 26.6–33)
MCHC RBC AUTO-ENTMCNC: 33.7 G/DL (ref 31.5–35.7)
MCV RBC AUTO: 88.3 FL (ref 79–97)
MICROCYTES BLD QL: ABNORMAL
MONOCYTES # BLD AUTO: 0.21 10*3/MM3 (ref 0.1–0.9)
NEUTROPHILS # BLD AUTO: 3.29 10*3/MM3 (ref 1.7–7)
NEUTROPHILS NFR BLD MANUAL: 46 % (ref 42.7–76)
NEUTS BAND NFR BLD MANUAL: 1 % (ref 0–5)
PLAT MORPH BLD: NORMAL
PLATELET # BLD AUTO: 245 10*3/MM3 (ref 140–450)
PMV BLD AUTO: 9.1 FL (ref 6–12)
RBC # BLD AUTO: 4.35 10*6/MM3 (ref 3.77–5.28)
SCAN SLIDE: NORMAL
WBC # BLD AUTO: 7 10*3/MM3 (ref 3.4–10.8)
WBC MORPH BLD: NORMAL

## 2021-04-09 PROCEDURE — 85025 COMPLETE CBC W/AUTO DIFF WBC: CPT | Performed by: INTERNAL MEDICINE

## 2021-04-09 PROCEDURE — 99217 PR OBSERVATION CARE DISCHARGE MANAGEMENT: CPT | Performed by: INTERNAL MEDICINE

## 2021-04-09 PROCEDURE — G0378 HOSPITAL OBSERVATION PER HR: HCPCS

## 2021-04-09 PROCEDURE — 85007 BL SMEAR W/DIFF WBC COUNT: CPT | Performed by: INTERNAL MEDICINE

## 2021-04-09 PROCEDURE — 25010000002 LINEZOLID 600 MG/300ML SOLUTION: Performed by: INTERNAL MEDICINE

## 2021-04-09 RX ORDER — HYDROCODONE BITARTRATE AND ACETAMINOPHEN 5; 325 MG/1; MG/1
1 TABLET ORAL EVERY 4 HOURS PRN
Qty: 8 TABLET | Refills: 0 | Status: SHIPPED | OUTPATIENT
Start: 2021-04-09 | End: 2021-04-13

## 2021-04-09 RX ORDER — LINEZOLID 600 MG/1
600 TABLET, FILM COATED ORAL 2 TIMES DAILY
Qty: 22 TABLET | Refills: 0 | Status: SHIPPED | OUTPATIENT
Start: 2021-04-09 | End: 2022-02-02

## 2021-04-09 RX ADMIN — LISINOPRIL 10 MG: 5 TABLET ORAL at 08:38

## 2021-04-09 RX ADMIN — FAMOTIDINE 20 MG: 10 INJECTION INTRAVENOUS at 08:37

## 2021-04-09 RX ADMIN — HYDROCODONE BITARTRATE AND ACETAMINOPHEN 1 TABLET: 5; 325 TABLET ORAL at 12:10

## 2021-04-09 RX ADMIN — ATORVASTATIN CALCIUM 10 MG: 10 TABLET, FILM COATED ORAL at 08:38

## 2021-04-09 RX ADMIN — Medication 1 APPLICATION: at 08:37

## 2021-04-09 RX ADMIN — LEVOTHYROXINE SODIUM 25 MCG: 0.03 TABLET ORAL at 05:26

## 2021-04-09 RX ADMIN — LINEZOLID 600 MG: 600 INJECTION, SOLUTION INTRAVENOUS at 12:11

## 2021-04-09 NOTE — PROGRESS NOTES
Case Management Discharge Note                Selected Continued Care - Discharged on 4/9/2021 Admission date: 4/6/2021 - Discharge disposition: Home or Self Care     Final Discharge Disposition Code: 01 - home or self-care

## 2021-04-09 NOTE — DISCHARGE SUMMARY
Date of Admission: 4/6/2021    Date of Discharge:  4/9/2021    Length of stay:  LOS: 0 days     Presenting Problem:   Abscess of face [L02.01]      Principal and Active Diagnosis During Hospital Stay:     Active Hospital Problems    Diagnosis  POA   • **Abscess of face [L02.01]  Unknown   • Hyperlipidemia [E78.5]  Yes   • Essential hypertension [I10]  Yes   • Hypothyroidism (acquired) [E03.9]  Yes   • Hypokalemia [E87.6]  Yes   • Elevated serum creatinine [R79.89]  Yes      Resolved Hospital Problems   No resolved problems to display.       Abscess of face D/T MRSA  -s/p I and D facial abscess or 4/6/21  -General surgery removed Penrose drain signed off, follow-up 1 week  -wound Cx 4/6/21: MRSA sensitive to Zyvox  -Discharged on Zyvox will complete 2 weeks total  -ID following appreciate recs     CKD stage II  -C creatinine stable follow     Hypokalemia  -Replace and monitor per protocol     Hypertension  -Continue lisinopril     Hyperlipidemia  -Check lipid panel  -Continue statin     Depression  -Hold Lexapro while on linezolid, and resume thereafter     Hypothyroidism  -Check TSH  -Continue levothyroxine    Hospital Course  Patient is a 65 y.o. female presented with facial abscess having been status post I&D with general surgery.  She was admitted she did well.  We finally were able to receive culture results and she will be discharged on antibiotics as noted above.  She will follow-up with general surgery in 1 week and was felt stable to discharge home.        Procedures Performed: As noted    Procedure(s):  Incision and drainage of abscess of right face/cheek  -------------------       Consults:   Consults     Date and Time Order Name Status Description    4/6/2021  6:43 PM Inpatient Hospitalist Consult Completed     4/6/2021  5:16 PM Inpatient Infectious Diseases Consult Completed           Pertinent Test Results:     Results from last 7 days   Lab Units 04/09/21  0250 04/08/21  0455 04/07/21  0834   WBC  10*3/mm3 7.00 8.00 9.90   HEMOGLOBIN g/dL 13.0 13.1 12.3   HEMATOCRIT % 38.4 39.5 36.2   PLATELETS 10*3/mm3 245 232 226   MONOCYTES % % 3.0*  --   --      Results from last 7 days   Lab Units 04/08/21  0455 04/07/21  0834 04/06/21  1327   SODIUM mmol/L 138 140 141   POTASSIUM mmol/L 3.7 3.7 3.4*   CHLORIDE mmol/L 99 98 95*   CO2 mmol/L 32.0* 33.0* 32.0*   BUN mg/dL 18 18 27*   CREATININE mg/dL 1.00 0.88 1.19*   CALCIUM mg/dL 8.5* 8.4* 9.4   BILIRUBIN mg/dL  --  0.4 0.4   ALK PHOS U/L  --  114 102   ALT (SGPT) U/L  --  143* 107*   AST (SGOT) U/L  --  104* 59*   GLUCOSE mg/dL 113* 127* 104*       Microbiology Results (last 10 days)     Procedure Component Value - Date/Time    Blood Culture - Blood, Arm, Right [[id number]] Collected: 04/06/21 2105    Lab Status: Preliminary result Specimen: Blood from Arm, Right Updated: 04/08/21 2115     Blood Culture No growth at 2 days    Blood Culture - Blood, Hand, Right [[id number]] Collected: 04/06/21 2105    Lab Status: Preliminary result Specimen: Blood from Hand, Right Updated: 04/08/21 2115     Blood Culture No growth at 2 days    Wound Culture - Wound, Face [[id number]]  (Abnormal)  (Susceptibility) Collected: 04/06/21 1712    Lab Status: Edited Result - FINAL Specimen: Wound from Face Updated: 04/09/21 0848     Wound Culture Moderate growth (3+) Staphylococcus aureus, MRSA     Comment: Methicillin resistant Staphylococcus aureus, Patient may be an isolation risk.        Gram Stain Rare (1+) Gram positive cocci in clusters      Many (4+) WBCs per low power field    Susceptibility      Staphylococcus aureus, MRSA      HARJINDER      Clindamycin Susceptible      Erythromycin Resistant      Inducible Clindamycin Resistance Negative      Linezolid Susceptible (C)  [1]       Oxacillin Resistant      Penicillin G Resistant      Rifampin Susceptible      Tetracycline Susceptible      Trimethoprim + Sulfamethoxazole Susceptible      Vancomycin Susceptible              [1]  Appended report.  These results have been appended to a previously final verified report.          Linear View               Susceptibility Comments     Staphylococcus aureus, MRSA    This isolate does not demonstrate inducible clindamycin resistance in vitro.   requested Linezolid 4/9/21             COVID-19,CEPHEID,COR/POLLO/PAD IN-HOUSE(OR EMERGENT/ADD-ON),NP SWAB IN TRANSPORT MEDIA 3-4 HR TAT, RT-PCR - Swab, Nasopharynx [[id number]]  (Normal) Collected: 04/06/21 1323    Lab Status: Final result Specimen: Swab from Nasopharynx Updated: 04/06/21 1359     COVID19 Not Detected    Narrative:      Fact sheet for providers: https://www.fda.gov/media/494154/download     Fact sheet for patients: https://www.fda.gov/media/587729/download              Imaging Results (All)     None            Condition on Discharge: Stable    Vital Signs  Temp:  [97.4 °F (36.3 °C)-98.3 °F (36.8 °C)] 98.3 °F (36.8 °C)  Heart Rate:  [61-66] 64  Resp:  [15-18] 15  BP: (136-177)/(68-85) 144/78    Physical Exam:  Physical Exam  HENT:      Mouth/Throat:      Comments: 2 small incision areas on the right side of the mouth and cheek with some only minimal induration and no drainage  Pulmonary:      Effort: Pulmonary effort is normal. No respiratory distress.   Abdominal:      General: There is no distension.      Palpations: Abdomen is soft.   Skin:     General: Skin is warm.   Neurological:      Mental Status: She is alert and oriented to person, place, and time.         Discharge Disposition  Home or Self Care    Discharge Medications     Discharge Medications      New Medications      Instructions Start Date   HYDROcodone-acetaminophen 5-325 MG per tablet  Commonly known as: NORCO   1 tablet, Oral, Every 4 Hours PRN      linezolid 600 MG tablet  Commonly known as: Zyvox   600 mg, Oral, 2 Times Daily         Continue These Medications      Instructions Start Date   colesevelam 3.75 g pack pack  Commonly known as: WELCHOL   Oral, As Needed      Lipitor 10 MG  tablet  Generic drug: atorvastatin   LIPITOR 10 MG TABS      lisinopril-hydrochlorothiazide 10-12.5 MG per tablet  Commonly known as: PRINZIDE,ZESTORETIC   LISINOPRIL-HYDROCHLOROTHIAZIDE 10-12.5 MG TABS      Mobic 7.5 MG tablet  Generic drug: meloxicam   MOBIC 7.5 MG TABS      Synthroid 25 MCG tablet  Generic drug: levothyroxine   SYNTHROID 25 MCG TABS         Stop These Medications    escitalopram 20 MG tablet  Commonly known as: LEXAPRO     sulfamethoxazole-trimethoprim 800-160 MG per tablet  Commonly known as: BACTRIM DS,SEPTRA DS            Discharge Diet:   Diet Instructions     Diet: Regular      Discharge Diet: Regular          Activity at Discharge:   Activity Instructions     Gradually Increase Activity Until at Pre-Hospitalization Level            Follow-up Appointments  No future appointments.  Additional Instructions for the Follow-ups that You Need to Schedule     Discharge Follow-up with Specified Provider: Dr Hernandes General surgery; 1 Week   As directed      To: Dr Hernandes General surgery    Follow Up: 1 Week               Test Results Pending at Discharge  Pending Labs     Order Current Status    Blood Culture - Blood, Arm, Right Preliminary result    Blood Culture - Blood, Hand, Right Preliminary result           Risk for Readmission (LACE) Score: 4 (4/9/2021  6:01 AM)      This patient has been examined wearing appropriate Personal Protective Equipment . 04/09/21          Electronically signed by Abdoul Win DO, 04/09/21, 11:49 EDT.

## 2021-04-09 NOTE — PLAN OF CARE
Problem: Adult Inpatient Plan of Care  Goal: Plan of Care Review  Outcome: Ongoing, Progressing  Flowsheets  Taken 4/9/2021 0210 by Marcy Hines RN  Progress: improving  Outcome Summary: Pt has not complained of any pain. Has ambulated to the bathroom with no issues.  VSS  Will continue to monitor  Taken 4/8/2021 1631 by Clarissa Mercado RN  Plan of Care Reviewed With: patient  Goal: Patient-Specific Goal (Individualized)  Outcome: Ongoing, Progressing  Goal: Absence of Hospital-Acquired Illness or Injury  Outcome: Ongoing, Progressing  Intervention: Identify and Manage Fall Risk  Recent Flowsheet Documentation  Taken 4/8/2021 2301 by Marcy Hines RN  Safety Promotion/Fall Prevention:   safety round/check completed   room organization consistent   nonskid shoes/slippers when out of bed   lighting adjusted   clutter free environment maintained   assistive device/personal items within reach   activity supervised  Taken 4/8/2021 2213 by Marcy Hines RN  Safety Promotion/Fall Prevention:   safety round/check completed   room organization consistent   nonskid shoes/slippers when out of bed   lighting adjusted   clutter free environment maintained   assistive device/personal items within reach   activity supervised  Taken 4/8/2021 1925 by Marcy Hines RN  Safety Promotion/Fall Prevention:   safety round/check completed   room organization consistent   nonskid shoes/slippers when out of bed   lighting adjusted   clutter free environment maintained   assistive device/personal items within reach   activity supervised  Intervention: Prevent Skin Injury  Recent Flowsheet Documentation  Taken 4/8/2021 2301 by Marcy Hines RN  Body Position: position changed independently  Taken 4/8/2021 2213 by Marcy Hines RN  Body Position: position changed independently  Taken 4/8/2021 1925 by Marcy Hines RN  Body Position: position changed independently  Intervention: Prevent  Infection  Recent Flowsheet Documentation  Taken 4/8/2021 2301 by Marcy Hines RN  Infection Prevention:   visitors restricted/screened   single patient room provided   rest/sleep promoted   personal protective equipment utilized   hand hygiene promoted   equipment surfaces disinfected  Taken 4/8/2021 2213 by Marcy Hines RN  Infection Prevention:   visitors restricted/screened   single patient room provided   rest/sleep promoted   personal protective equipment utilized   hand hygiene promoted   equipment surfaces disinfected  Taken 4/8/2021 1925 by Marcy Hines, RN  Infection Prevention:   visitors restricted/screened   single patient room provided   rest/sleep promoted   personal protective equipment utilized   hand hygiene promoted   equipment surfaces disinfected  Goal: Optimal Comfort and Wellbeing  Outcome: Ongoing, Progressing  Intervention: Provide Person-Centered Care  Recent Flowsheet Documentation  Taken 4/8/2021 2301 by Marcy Hines RN  Trust Relationship/Rapport:   questions answered   questions encouraged   reassurance provided   thoughts/feelings acknowledged   care explained  Taken 4/8/2021 2213 by Marcy Hines RN  Trust Relationship/Rapport:   care explained   questions answered   questions encouraged   reassurance provided   thoughts/feelings acknowledged  Taken 4/8/2021 1925 by Marcy Hines RN  Trust Relationship/Rapport: care explained  Goal: Readiness for Transition of Care  Outcome: Ongoing, Progressing     Problem: Pain Acute  Goal: Optimal Pain Control  Outcome: Ongoing, Progressing  Intervention: Prevent or Manage Pain  Recent Flowsheet Documentation  Taken 4/8/2021 2301 by Marcy Hines RN  Sleep/Rest Enhancement:   awakenings minimized   noise level reduced   regular sleep/rest pattern promoted  Taken 4/8/2021 1925 by Marcy Hines RN  Sensory Stimulation Regulation:   lighting decreased   care clustered   auditory stimulation  minimized   quiet environment promoted     Problem: Bleeding (Surgery Nonspecified)  Goal: Absence of Bleeding  Outcome: Ongoing, Progressing     Problem: Infection (Surgery Nonspecified)  Goal: Absence of Infection Signs and Symptoms  Outcome: Ongoing, Progressing     Problem: Pain (Surgery Nonspecified)  Goal: Acceptable Pain Control  Outcome: Ongoing, Progressing   Goal Outcome Evaluation:  Plan of Care Reviewed With: patient  Progress: improving  Outcome Summary: Pt has not complained of any pain. Has ambulated to the bathroom with no issues.  VSS  Will continue to monitor

## 2021-04-09 NOTE — PROGRESS NOTES
Infectious Diseases Progress Note      LOS: 0 days   Patient Care Team:  Ariel Rider MD as PCP - General    Chief Complaint: Right facial pain    Subjective       The patient had no fever during the last 24 hours.  The patient remained hemodynamically stable.  The patient did not have any new complaints today.    Review of Systems:   Review of Systems   Constitutional: Negative.    HENT: Negative.    Eyes: Negative.    Respiratory: Negative.    Cardiovascular: Negative.    Gastrointestinal: Negative.    Genitourinary: Negative.    Musculoskeletal: Negative.    Skin: Positive for wound.   Neurological: Negative.    Hematological: Negative.    Psychiatric/Behavioral: Negative.         Objective     Vital Signs  Temp:  [97.4 °F (36.3 °C)-98.3 °F (36.8 °C)] 98.3 °F (36.8 °C)  Heart Rate:  [61-66] 64  Resp:  [15-18] 15  BP: (136-177)/(68-85) 144/78    Physical Exam:  Physical Exam  Vitals and nursing note reviewed.   Constitutional:       Appearance: She is well-developed.   HENT:      Head: Normocephalic and atraumatic.      Mouth/Throat:      Comments: S/p right facial incision with no significant fluctuance or erythema.  Patient has mild induration  Eyes:      Pupils: Pupils are equal, round, and reactive to light.   Cardiovascular:      Rate and Rhythm: Normal rate and regular rhythm.      Heart sounds: Normal heart sounds.   Pulmonary:      Effort: Pulmonary effort is normal. No respiratory distress.      Breath sounds: Normal breath sounds. No wheezing or rales.   Abdominal:      General: Bowel sounds are normal. There is no distension.      Palpations: Abdomen is soft. There is no mass.      Tenderness: There is no abdominal tenderness. There is no guarding or rebound.   Musculoskeletal:         General: No deformity. Normal range of motion.      Cervical back: Normal range of motion and neck supple.   Skin:     General: Skin is warm.      Findings: No erythema or rash.   Neurological:      Mental Status: She  is alert and oriented to person, place, and time.      Cranial Nerves: No cranial nerve deficit.          Results Review:    I have reviewed all clinical data, test, lab, and imaging results.     Radiology  No Radiology Exams Resulted Within Past 24 Hours    Cardiology    Laboratory    Results from last 7 days   Lab Units 04/09/21  0250 04/08/21  0455 04/07/21  0834 04/06/21  1327   WBC 10*3/mm3 7.00 8.00 9.90 13.70*   HEMOGLOBIN g/dL 13.0 13.1 12.3 13.3   HEMATOCRIT % 38.4 39.5 36.2 39.8   PLATELETS 10*3/mm3 245 232 226 240     Results from last 7 days   Lab Units 04/08/21  0455 04/07/21  0834 04/06/21  1327   SODIUM mmol/L 138 140 141   POTASSIUM mmol/L 3.7 3.7 3.4*   CHLORIDE mmol/L 99 98 95*   CO2 mmol/L 32.0* 33.0* 32.0*   BUN mg/dL 18 18 27*   CREATININE mg/dL 1.00 0.88 1.19*   GLUCOSE mg/dL 113* 127* 104*   ALBUMIN g/dL  --  3.50 4.10   BILIRUBIN mg/dL  --  0.4 0.4   ALK PHOS U/L  --  114 102   AST (SGOT) U/L  --  104* 59*   ALT (SGPT) U/L  --  143* 107*   CALCIUM mg/dL 8.5* 8.4* 9.4                 Microbiology   Microbiology Results (last 10 days)     Procedure Component Value - Date/Time    Blood Culture - Blood, Arm, Right [[id number]] Collected: 04/06/21 2105    Lab Status: Preliminary result Specimen: Blood from Arm, Right Updated: 04/08/21 2115     Blood Culture No growth at 2 days    Blood Culture - Blood, Hand, Right [[id number]] Collected: 04/06/21 2105    Lab Status: Preliminary result Specimen: Blood from Hand, Right Updated: 04/08/21 2115     Blood Culture No growth at 2 days    Wound Culture - Wound, Face [[id number]]  (Abnormal)  (Susceptibility) Collected: 04/06/21 1712    Lab Status: Edited Result - FINAL Specimen: Wound from Face Updated: 04/09/21 0848     Wound Culture Moderate growth (3+) Staphylococcus aureus, MRSA     Comment: Methicillin resistant Staphylococcus aureus, Patient may be an isolation risk.        Gram Stain Rare (1+) Gram positive cocci in clusters      Many (4+) WBCs per low  power field    Susceptibility      Staphylococcus aureus, MRSA      HARJINDER      Clindamycin Susceptible      Erythromycin Resistant      Inducible Clindamycin Resistance Negative      Linezolid Susceptible (C)  [1]       Oxacillin Resistant      Penicillin G Resistant      Rifampin Susceptible      Tetracycline Susceptible      Trimethoprim + Sulfamethoxazole Susceptible      Vancomycin Susceptible              [1]  Appended report. These results have been appended to a previously final verified report.          Linear View               Susceptibility Comments     Staphylococcus aureus, MRSA    This isolate does not demonstrate inducible clindamycin resistance in vitro.   requested Linezolid 4/9/21             COVID-19,CEPHEID,COR/POLLO/PAD IN-HOUSE(OR EMERGENT/ADD-ON),NP SWAB IN TRANSPORT MEDIA 3-4 HR TAT, RT-PCR - Swab, Nasopharynx [[id number]]  (Normal) Collected: 04/06/21 1323    Lab Status: Final result Specimen: Swab from Nasopharynx Updated: 04/06/21 1359     COVID19 Not Detected    Narrative:      Fact sheet for providers: https://www.fda.gov/media/605439/download     Fact sheet for patients: https://www.fda.gov/media/861865/download          Medication Review:       Schedule Meds  atorvastatin, 10 mg, Oral, Daily  famotidine, 20 mg, Intravenous, BID  levothyroxine, 25 mcg, Oral, Q AM  Linezolid, 600 mg, Intravenous, Q12H  lisinopril, 10 mg, Oral, Q24H  neomycin-bacitracin-polymyxin b, , Topical, TID        Infusion Meds  sodium chloride, 100 mL/hr, Last Rate: Stopped (04/07/21 6388)        PRN Meds  •  acetaminophen **OR** acetaminophen  •  colesevelam  •  HYDROcodone-acetaminophen  •  HYDROmorphone **AND** naloxone  •  labetalol  •  Morphine **AND** naloxone  •  ondansetron **OR** ondansetron  •  oxyCODONE  •  potassium chloride **OR** potassium chloride **OR** potassium chloride  •  promethazine **OR** promethazine        Assessment/Plan       Antimicrobial Therapy   1.   Zyvox      day  2.      Day  3.      Day  4.      Day  5.      Day            Assessment     Right-sided facial abscess.    S/p I&D on April 6, 2021 and intraoperative cultures grew methicillin-resistant Staphylococcus aureus and the organism was susceptible to linezolid       History of rheumatoid arthritis although patient is not on any immunosuppressive treatment           Plan     Okay to discharge home on p.o. linezolid 600 mg twice daily for 12 days which will be a total of 2 weeks of treatment  Recommend to repeat CBC to monitor platelets in 4 to 5 days  Case was discussed with primary service           Annamaria Blum MD  04/09/21  13:56 EDT      Note is dictated utilizing voice recognition software/Dragon

## 2021-04-11 LAB
BACTERIA SPEC AEROBE CULT: NORMAL
BACTERIA SPEC AEROBE CULT: NORMAL

## 2021-04-26 ENCOUNTER — OFFICE VISIT (OUTPATIENT)
Dept: SURGERY | Facility: CLINIC | Age: 66
End: 2021-04-26

## 2021-04-26 VITALS
HEIGHT: 65 IN | BODY MASS INDEX: 29.99 KG/M2 | OXYGEN SATURATION: 98 % | DIASTOLIC BLOOD PRESSURE: 85 MMHG | TEMPERATURE: 98.4 F | SYSTOLIC BLOOD PRESSURE: 181 MMHG | HEART RATE: 81 BPM | WEIGHT: 180 LBS

## 2021-04-26 DIAGNOSIS — L02.01 ABSCESS OF FACE: Primary | ICD-10-CM

## 2021-04-26 PROBLEM — M06.9 RHEUMATOID ARTHRITIS: Status: ACTIVE | Noted: 2021-04-26

## 2021-04-26 PROCEDURE — 99024 POSTOP FOLLOW-UP VISIT: CPT | Performed by: SURGERY

## 2021-04-26 NOTE — PROGRESS NOTES
SUBJECTIVE:    Is seen in the office today follow-up from the incision and drainage of her right face from a MRSA infection.  She is now 20 days postop.    OBJECTIVE:    On exam she has 2 small scars but all swelling is pretty much gone.    ASSESSMENT:    Satisfactory postop progress    PLAN:    Recheck in the office as needed.  I explained she may be a MRSA carrier and should she develop any hint of an infection she needs to call to get on Bactrim right away.

## 2022-02-02 ENCOUNTER — HOSPITAL ENCOUNTER (INPATIENT)
Facility: HOSPITAL | Age: 67
LOS: 1 days | Discharge: HOME | End: 2022-02-05
Attending: EMERGENCY MEDICINE | Admitting: INTERNAL MEDICINE

## 2022-02-02 ENCOUNTER — APPOINTMENT (OUTPATIENT)
Dept: CT IMAGING | Facility: HOSPITAL | Age: 67
End: 2022-02-02

## 2022-02-02 DIAGNOSIS — J22 LOWER RESPIRATORY TRACT INFECTION DUE TO COVID-19 VIRUS: Primary | ICD-10-CM

## 2022-02-02 DIAGNOSIS — J38.3 VOCAL CORDS SWELLING: ICD-10-CM

## 2022-02-02 DIAGNOSIS — U07.1 LOWER RESPIRATORY TRACT INFECTION DUE TO COVID-19 VIRUS: Primary | ICD-10-CM

## 2022-02-02 LAB
ALBUMIN SERPL-MCNC: 4.5 G/DL (ref 3.5–5.2)
ALBUMIN/GLOB SERPL: 1.5 G/DL
ALP SERPL-CCNC: 89 U/L (ref 39–117)
ALT SERPL W P-5'-P-CCNC: 62 U/L (ref 1–33)
ANION GAP SERPL CALCULATED.3IONS-SCNC: 14 MMOL/L (ref 5–15)
AST SERPL-CCNC: 47 U/L (ref 1–32)
B PARAPERT DNA SPEC QL NAA+PROBE: NOT DETECTED
B PERT DNA SPEC QL NAA+PROBE: NOT DETECTED
BASOPHILS # BLD AUTO: 0 10*3/MM3 (ref 0–0.2)
BASOPHILS NFR BLD AUTO: 0.3 % (ref 0–1.5)
BILIRUB SERPL-MCNC: 0.4 MG/DL (ref 0–1.2)
BUN SERPL-MCNC: 7 MG/DL (ref 8–23)
BUN/CREAT SERPL: 8.2 (ref 7–25)
C PNEUM DNA NPH QL NAA+NON-PROBE: NOT DETECTED
CALCIUM SPEC-SCNC: 8.7 MG/DL (ref 8.6–10.5)
CHLORIDE SERPL-SCNC: 98 MMOL/L (ref 98–107)
CO2 SERPL-SCNC: 29 MMOL/L (ref 22–29)
CREAT SERPL-MCNC: 0.85 MG/DL (ref 0.57–1)
D-LACTATE SERPL-SCNC: 1.6 MMOL/L (ref 0.5–2)
DEPRECATED RDW RBC AUTO: 42.9 FL (ref 37–54)
EOSINOPHIL # BLD AUTO: 0 10*3/MM3 (ref 0–0.4)
EOSINOPHIL NFR BLD AUTO: 0.1 % (ref 0.3–6.2)
ERYTHROCYTE [DISTWIDTH] IN BLOOD BY AUTOMATED COUNT: 13.8 % (ref 12.3–15.4)
FLUAV SUBTYP SPEC NAA+PROBE: NOT DETECTED
FLUBV RNA ISLT QL NAA+PROBE: NOT DETECTED
GFR SERPL CREATININE-BSD FRML MDRD: 67 ML/MIN/1.73
GLOBULIN UR ELPH-MCNC: 3 GM/DL
GLUCOSE SERPL-MCNC: 124 MG/DL (ref 65–99)
HADV DNA SPEC NAA+PROBE: NOT DETECTED
HCOV 229E RNA SPEC QL NAA+PROBE: NOT DETECTED
HCOV HKU1 RNA SPEC QL NAA+PROBE: NOT DETECTED
HCOV NL63 RNA SPEC QL NAA+PROBE: NOT DETECTED
HCOV OC43 RNA SPEC QL NAA+PROBE: NOT DETECTED
HCT VFR BLD AUTO: 45.4 % (ref 34–46.6)
HGB BLD-MCNC: 15.1 G/DL (ref 12–15.9)
HMPV RNA NPH QL NAA+NON-PROBE: NOT DETECTED
HOLD SPECIMEN: NORMAL
HOLD SPECIMEN: NORMAL
HPIV1 RNA ISLT QL NAA+PROBE: NOT DETECTED
HPIV2 RNA SPEC QL NAA+PROBE: NOT DETECTED
HPIV3 RNA NPH QL NAA+PROBE: NOT DETECTED
HPIV4 P GENE NPH QL NAA+PROBE: NOT DETECTED
LYMPHOCYTES # BLD AUTO: 1.4 10*3/MM3 (ref 0.7–3.1)
LYMPHOCYTES NFR BLD AUTO: 11.1 % (ref 19.6–45.3)
M PNEUMO IGG SER IA-ACNC: NOT DETECTED
MCH RBC QN AUTO: 29.3 PG (ref 26.6–33)
MCHC RBC AUTO-ENTMCNC: 33.3 G/DL (ref 31.5–35.7)
MCV RBC AUTO: 88.1 FL (ref 79–97)
MONOCYTES # BLD AUTO: 1.5 10*3/MM3 (ref 0.1–0.9)
MONOCYTES NFR BLD AUTO: 11.7 % (ref 5–12)
NEUTROPHILS NFR BLD AUTO: 76.8 % (ref 42.7–76)
NEUTROPHILS NFR BLD AUTO: 9.8 10*3/MM3 (ref 1.7–7)
NRBC BLD AUTO-RTO: 0.2 /100 WBC (ref 0–0.2)
NT-PROBNP SERPL-MCNC: 121.1 PG/ML (ref 0–900)
PLATELET # BLD AUTO: 176 10*3/MM3 (ref 140–450)
PMV BLD AUTO: 9.7 FL (ref 6–12)
POTASSIUM SERPL-SCNC: 3.3 MMOL/L (ref 3.5–5.2)
PROCALCITONIN SERPL-MCNC: 0.1 NG/ML (ref 0–0.25)
PROT SERPL-MCNC: 7.5 G/DL (ref 6–8.5)
RBC # BLD AUTO: 5.16 10*6/MM3 (ref 3.77–5.28)
RHINOVIRUS RNA SPEC NAA+PROBE: NOT DETECTED
RSV RNA NPH QL NAA+NON-PROBE: NOT DETECTED
S PYO AG THROAT QL: NEGATIVE
SARS-COV-2 RNA NPH QL NAA+NON-PROBE: DETECTED
SODIUM SERPL-SCNC: 141 MMOL/L (ref 136–145)
TROPONIN T SERPL-MCNC: <0.01 NG/ML (ref 0–0.03)
WBC NRBC COR # BLD: 12.7 10*3/MM3 (ref 3.4–10.8)
WHOLE BLOOD HOLD SPECIMEN: NORMAL
WHOLE BLOOD HOLD SPECIMEN: NORMAL

## 2022-02-02 PROCEDURE — 99285 EMERGENCY DEPT VISIT HI MDM: CPT

## 2022-02-02 PROCEDURE — 25010000002 DEXAMETHASONE PER 1 MG: Performed by: EMERGENCY MEDICINE

## 2022-02-02 PROCEDURE — 25010000002 AZITHROMYCIN PER 500 MG: Performed by: EMERGENCY MEDICINE

## 2022-02-02 PROCEDURE — 84484 ASSAY OF TROPONIN QUANT: CPT | Performed by: EMERGENCY MEDICINE

## 2022-02-02 PROCEDURE — 25010000002 CEFTRIAXONE PER 250 MG: Performed by: EMERGENCY MEDICINE

## 2022-02-02 PROCEDURE — 84145 PROCALCITONIN (PCT): CPT | Performed by: EMERGENCY MEDICINE

## 2022-02-02 PROCEDURE — 80053 COMPREHEN METABOLIC PANEL: CPT | Performed by: EMERGENCY MEDICINE

## 2022-02-02 PROCEDURE — 87651 STREP A DNA AMP PROBE: CPT | Performed by: EMERGENCY MEDICINE

## 2022-02-02 PROCEDURE — 0202U NFCT DS 22 TRGT SARS-COV-2: CPT | Performed by: EMERGENCY MEDICINE

## 2022-02-02 PROCEDURE — 87040 BLOOD CULTURE FOR BACTERIA: CPT | Performed by: EMERGENCY MEDICINE

## 2022-02-02 PROCEDURE — 25010000002 ENOXAPARIN PER 10 MG: Performed by: FAMILY MEDICINE

## 2022-02-02 PROCEDURE — 0 IOPAMIDOL PER 1 ML: Performed by: EMERGENCY MEDICINE

## 2022-02-02 PROCEDURE — G0378 HOSPITAL OBSERVATION PER HR: HCPCS

## 2022-02-02 PROCEDURE — 85025 COMPLETE CBC W/AUTO DIFF WBC: CPT | Performed by: EMERGENCY MEDICINE

## 2022-02-02 PROCEDURE — 83605 ASSAY OF LACTIC ACID: CPT

## 2022-02-02 PROCEDURE — 71275 CT ANGIOGRAPHY CHEST: CPT

## 2022-02-02 PROCEDURE — 70491 CT SOFT TISSUE NECK W/DYE: CPT

## 2022-02-02 PROCEDURE — 94640 AIRWAY INHALATION TREATMENT: CPT

## 2022-02-02 PROCEDURE — 83880 ASSAY OF NATRIURETIC PEPTIDE: CPT | Performed by: EMERGENCY MEDICINE

## 2022-02-02 RX ORDER — LEVOTHYROXINE SODIUM 0.03 MG/1
25 TABLET ORAL
Status: DISCONTINUED | OUTPATIENT
Start: 2022-02-03 | End: 2022-02-04

## 2022-02-02 RX ORDER — SODIUM CHLORIDE 0.9 % (FLUSH) 0.9 %
3 SYRINGE (ML) INJECTION EVERY 12 HOURS SCHEDULED
Status: DISCONTINUED | OUTPATIENT
Start: 2022-02-02 | End: 2022-02-05 | Stop reason: HOSPADM

## 2022-02-02 RX ORDER — ATORVASTATIN CALCIUM 10 MG/1
10 TABLET, FILM COATED ORAL NIGHTLY
Status: DISCONTINUED | OUTPATIENT
Start: 2022-02-02 | End: 2022-02-05 | Stop reason: HOSPADM

## 2022-02-02 RX ORDER — ASCORBIC ACID 500 MG
500 TABLET ORAL DAILY
Status: DISCONTINUED | OUTPATIENT
Start: 2022-02-02 | End: 2022-02-05 | Stop reason: HOSPADM

## 2022-02-02 RX ORDER — SODIUM CHLORIDE 0.9 % (FLUSH) 0.9 %
3-10 SYRINGE (ML) INJECTION AS NEEDED
Status: DISCONTINUED | OUTPATIENT
Start: 2022-02-02 | End: 2022-02-05 | Stop reason: HOSPADM

## 2022-02-02 RX ORDER — DEXAMETHASONE SODIUM PHOSPHATE 4 MG/ML
6 INJECTION, SOLUTION INTRA-ARTICULAR; INTRALESIONAL; INTRAMUSCULAR; INTRAVENOUS; SOFT TISSUE ONCE
Status: COMPLETED | OUTPATIENT
Start: 2022-02-02 | End: 2022-02-02

## 2022-02-02 RX ORDER — SODIUM CHLORIDE 0.9 % (FLUSH) 0.9 %
10 SYRINGE (ML) INJECTION AS NEEDED
Status: DISCONTINUED | OUTPATIENT
Start: 2022-02-02 | End: 2022-02-05 | Stop reason: HOSPADM

## 2022-02-02 RX ORDER — ALBUTEROL SULFATE 90 UG/1
2 AEROSOL, METERED RESPIRATORY (INHALATION) ONCE
Status: COMPLETED | OUTPATIENT
Start: 2022-02-02 | End: 2022-02-02

## 2022-02-02 RX ORDER — ZINC SULFATE 50(220)MG
220 CAPSULE ORAL DAILY
Status: DISCONTINUED | OUTPATIENT
Start: 2022-02-02 | End: 2022-02-05 | Stop reason: HOSPADM

## 2022-02-02 RX ORDER — DEXAMETHASONE SODIUM PHOSPHATE 4 MG/ML
6 INJECTION, SOLUTION INTRA-ARTICULAR; INTRALESIONAL; INTRAMUSCULAR; INTRAVENOUS; SOFT TISSUE DAILY
Status: DISCONTINUED | OUTPATIENT
Start: 2022-02-03 | End: 2022-02-04

## 2022-02-02 RX ADMIN — IOPAMIDOL 100 ML: 755 INJECTION, SOLUTION INTRAVENOUS at 15:48

## 2022-02-02 RX ADMIN — SODIUM CHLORIDE, PRESERVATIVE FREE 10 ML: 5 INJECTION INTRAVENOUS at 19:46

## 2022-02-02 RX ADMIN — ALBUTEROL SULFATE 2 PUFF: 108 INHALANT RESPIRATORY (INHALATION) at 13:26

## 2022-02-02 RX ADMIN — ZINC SULFATE 220 MG (50 MG) CAPSULE 220 MG: CAPSULE at 19:59

## 2022-02-02 RX ADMIN — OXYCODONE HYDROCHLORIDE AND ACETAMINOPHEN 500 MG: 500 TABLET ORAL at 19:59

## 2022-02-02 RX ADMIN — AZITHROMYCIN MONOHYDRATE 500 MG: 500 INJECTION, POWDER, LYOPHILIZED, FOR SOLUTION INTRAVENOUS at 19:55

## 2022-02-02 RX ADMIN — DEXAMETHASONE SODIUM PHOSPHATE 6 MG: 4 INJECTION, SOLUTION INTRA-ARTICULAR; INTRALESIONAL; INTRAMUSCULAR; INTRAVENOUS; SOFT TISSUE at 13:41

## 2022-02-02 RX ADMIN — SODIUM CHLORIDE, PRESERVATIVE FREE 10 ML: 5 INJECTION INTRAVENOUS at 13:42

## 2022-02-02 RX ADMIN — IOPAMIDOL 50 ML: 755 INJECTION, SOLUTION INTRAVENOUS at 15:47

## 2022-02-02 RX ADMIN — ENOXAPARIN SODIUM 40 MG: 40 INJECTION SUBCUTANEOUS at 20:01

## 2022-02-02 RX ADMIN — CEFTRIAXONE SODIUM 1 G: 10 INJECTION, POWDER, FOR SOLUTION INTRAVENOUS at 19:48

## 2022-02-02 NOTE — ED PROVIDER NOTES
Subjective   66-year-old female complaint of shortness of breath for the last 2 days.  The patient reports that she symptoms started with muscle aches and pains.  She states she has had fever and chills for about 36 hours.  She states she woke up feeling as if she had a sore throat and hoarse today.  She reports no recent changes in her voice.  She reports no recent weight gain or weight loss.  She states that she has had some loose stools today.  She reports no change in smell or taste.  She states her  is admitted with a severe Covid symptoms in the hospital and he became sick before her          Review of Systems   HENT: Negative for trouble swallowing and voice change.    Eyes: Negative for itching.   Respiratory: Positive for cough, chest tightness and shortness of breath.    Cardiovascular: Negative for palpitations and leg swelling.   Gastrointestinal: Positive for nausea.   Hematological: Positive for adenopathy. Does not bruise/bleed easily.   All other systems reviewed and are negative.      Past Medical History:   Diagnosis Date   • Arthritis     Rheumatoid   • Disease of thyroid gland    • Hyperlipidemia    • Hypertension    • PONV (postoperative nausea and vomiting)    .  The patient states she had a primary series of vaccine.  The patient has a history of hyperlipidemia and hypertension.    No Known Allergies    Past Surgical History:   Procedure Laterality Date   • ABDOMINAL SURGERY     • CHOLECYSTECTOMY     • FINGER SURGERY     • HYSTERECTOMY     • INCISION AND DRAINAGE OF WOUND Right 4/6/2021    Procedure: Incision and drainage of abscess of right face/cheek;  Surgeon: Ariel Hernandes DO;  Location: [facility] MAIN OR;  Service: General;  Laterality: Right;   • OOPHORECTOMY     • THYROID SURGERY     • TUBAL ABDOMINAL LIGATION      1 tube removed       Family History   Problem Relation Age of Onset   • Diabetes Mother    • COPD Father        Social History     Socioeconomic History   • Marital  status:    Tobacco Use   • Smoking status: Never Smoker   • Smokeless tobacco: Never Used   Vaping Use   • Vaping Use: Never used   Substance and Sexual Activity   • Alcohol use: Not Currently     Comment: rarely   • Drug use: Never   • Sexual activity: Defer           Objective   Physical Exam  Alert Rafal Coma Scale 15   HEENT: Pupils equal and reactive to light. Conjunctivae are not injected. normal tympanic membranes. Oropharynx and nares are normal.   Neck: Supple. Midline trachea. No JVD. No goiter.   Chest: Bilateral rales are identified and equal breath sounds bilaterally regular rate and rhythm without murmur or rub.   Abdomen: Positive bowel sounds nontender nondistended. No rebound or peritoneal signs. No CVA tenderness.   Extremities no clubbing cyanosis or edema motor sensory exam is normal the full range of motion is intact   skin: Warm and dry, no rashes or petechia.   Lymphatic: Anterior cervical lymphadenopathy is noted  . No calf pain, swelling or Avril's sign    Procedures           ED Course                                       Labs Reviewed   RESPIRATORY PANEL PCR W/ COVID-19 (SARS-COV-2) CATRINA/DARI/POLLO/PAD/COR/MAD/DANIEL IN-HOUSE, NP SWAB IN UT/VTP, 3-4 HR TAT - Abnormal; Notable for the following components:       Result Value    COVID19 Detected (*)     All other components within normal limits    Narrative:     In the setting of a positive respiratory panel with a viral infection PLUS a negative procalcitonin without other underlying concern for bacterial infection, consider observing off antibiotics or discontinuation of antibiotics and continue supportive care. If the respiratory panel is positive for atypical bacterial infection (Bordetella pertussis, Chlamydophila pneumoniae, or Mycoplasma pneumoniae), consider antibiotic de-escalation to target atypical bacterial infection.   COMPREHENSIVE METABOLIC PANEL - Abnormal; Notable for the following components:    Glucose 124 (*)     BUN  7 (*)     Potassium 3.3 (*)     ALT (SGPT) 62 (*)     AST (SGOT) 47 (*)     All other components within normal limits    Narrative:     GFR Normal >60  Chronic Kidney Disease <60  Kidney Failure <15     CBC WITH AUTO DIFFERENTIAL - Abnormal; Notable for the following components:    WBC 12.70 (*)     Neutrophil % 76.8 (*)     Lymphocyte % 11.1 (*)     Eosinophil % 0.1 (*)     Neutrophils, Absolute 9.80 (*)     Monocytes, Absolute 1.50 (*)     All other components within normal limits   RAPID STREP A SCREEN - Normal   BNP (IN-HOUSE) - Normal    Narrative:     Among patients with dyspnea, NT-proBNP is highly sensitive for the detection of acute congestive heart failure. In addition NT-proBNP of <300 pg/ml effectively rules out acute congestive heart failure with 99% negative predictive value.    Results may be falsely decreased if patient taking Biotin.     TROPONIN (IN-HOUSE) - Normal    Narrative:     Troponin T Reference Range:  <= 0.03 ng/mL-   Negative for AMI  >0.03 ng/mL-     Abnormal for myocardial necrosis.  Clinicians would have to utilize clinical acumen, EKG, Troponin and serial changes to determine if it is an Acute Myocardial Infarction or myocardial injury due to an underlying chronic condition.       Results may be falsely decreased if patient taking Biotin.     PROCALCITONIN - Normal    Narrative:     As a Marker for Sepsis (Non-Neonates):     1. <0.5 ng/mL represents a low risk of severe sepsis and/or septic shock.  2. >2 ng/mL represents a high risk of severe sepsis and/or septic shock.    As a Marker for Lower Respiratory Tract Infections that require antibiotic therapy:  PCT on Admission     Antibiotic Therapy             6-12 Hrs later  >0.5                          Strongly Recommended            >0.25 - <0.5             Recommended  0.1 - 0.25                  Discouraged                       Remeasure/reassess PCT  <0.1                         Strongly Discouraged         Remeasure/reassess  "PCT      As 28 day mortality risk marker: \"Change in Procalcitonin Result\" (>80% or <=80%) if Day 0 (or Day 1) and Day 4 values are available. Refer to http://[email or web address] [email or web address]-calculator.com/    Change in PCT <=80 %   A decrease of PCT levels below or equal to 80% defines a positive change in PCT test result representing a higher risk for 28-day all-cause mortality of patients diagnosed with severe sepsis or septic shock.    Change in PCT >80 %   A decrease of PCT levels of more than 80% defines a negative change in PCT result representing a lower risk for 28-day all-cause mortality of patients diagnosed with severe sepsis or septic shock.               POC LACTATE - Normal   BLOOD CULTURE   BLOOD CULTURE   RAINBOW DRAW    Narrative:     The following orders were created for panel order [place] Draw.  Procedure                               Abnormality         Status                     ---------                               -----------         ------                     Green Top (Gel)[[id number]]                                  Final result               Lavender Top[[id number]]                                     Final result               Gold Top - SST[[id number]]                                   Final result               Light Blue Top[[id number]]                                   Final result                 Please view results for these tests on the individual orders.   POC LACTATE   CBC AND DIFFERENTIAL    Narrative:     The following orders were created for panel order CBC & Differential.  Procedure                               Abnormality         Status                     ---------                               -----------         ------                     CBC Auto Differential[[id number]]        Abnormal            Final result                 Please view results for these tests on the individual orders.   GREEN TOP   LAVENDER TOP   GOLD TOP - SST   LIGHT BLUE TOP     Medications   sodium chloride 0.9 % " flush 10 mL (10 mL Intravenous Given 2/2/22 1342)   azithromycin 500 mg IVPB in 250 mL NS (has no administration in time range)   cefTRIAXone (ROCEPHIN) in SWFI 1 gram/10ml IV PUSH syringe (has no administration in time range)   dexamethasone (DECADRON) injection 6 mg (6 mg Intravenous Given 2/2/22 1341)   albuterol sulfate HFA (PROVENTIL HFA;VENTOLIN HFA;PROAIR HFA) inhaler 2 puff (2 puffs Inhalation Given 2/2/22 1326)   iopamidol (ISOVUE-370) 76 % injection 100 mL (100 mL Intravenous Given 2/2/22 1548)   iopamidol (ISOVUE-370) 76 % injection 50 mL (50 mL Intravenous Given 2/2/22 1547)     CT Soft Tissue Neck With Contrast    Result Date: 2/2/2022  IMPRESSION : Irregular thickening of the vocal cords and aryepiglottic folds. This results in severe narrowing of the airway. Infiltrating tumor should be excluded. Recommend direct visualization. There are borderline enlarged level 2 internal jugular chain lymph nodes.  Electronically Signed ByDelia Garrett On:2/2/2022 4:12 PM This report was finalized on [id number] by  Kirill Garrett, .    CT Chest Pulmonary Embolism    Result Date: 2/2/2022   1. No evidence of pulmonary embolism 2. No acute pulmonary abnormality  Electronically Signed ByDelia Garrett On:2/2/2022 4:00 PM This report was finalized on [id number] by  Kirill Garrett, .                 MDM  Number of Diagnoses or Management Options     Amount and/or Complexity of Data Reviewed  Clinical lab tests: reviewed  Tests in the radiology section of CPT®: reviewed  Review and summarize past medical records: yes  Discuss the patient with other providers: yes  Independent visualization of images, tracings, or specimens: yes    Risk of Complications, Morbidity, and/or Mortality  Presenting problems: high  Diagnostic procedures: high  Management options: high  General comments: Patient was injected with steroids and will be started on antibiotics to the possibility of supraglottitis.  The patient reports no  recent history of soreness it should be noted.  The radiology reading raised the possibility also of infiltrating carcinoma.  Patient would appear to need direct laryngoscopy and potential biopsy.  The patient was agreeable to hospitalization        Final diagnoses:   Lower respiratory tract infection due to COVID-19 virus   Vocal cords swelling       ED Disposition  ED Disposition     ED Disposition Condition Comment    Decision to Admit            No follow-up provider specified.       Medication List      No changes were made to your prescriptions during this visit.          Virgilio Blackburn MD  02/02/22 6027

## 2022-02-02 NOTE — CONSULTS
Group: [facility]         CONSULT NOTE    Patient Identification:  Joi Cheng  66 y.o.  female  1955  [id number]            Requesting physician: Attending physician    Reason for Consultation:  Laryngeal swelling      History of Present Illness:  66-year-old female complaint of shortness of breath for the last 2 days.  The patient reports that she symptoms started with muscle aches and pains.  She states she has had fever and chills for about 36 hours.  She states she woke up feeling as if she had a sore throat and hoarse today.  She reports no recent changes in her voice.  She reports no recent weight gain or weight loss.  She states that she has had some loose stools today.  She reports no change in smell or taste.  She states her  is admitted with a severe Covid symptoms in the hospital and he became sick before her    CT Soft Tissue Neck With Contrast 2/2/2022   Irregular thickening of the vocal cords and aryepiglottic folds. This results in severe narrowing of the airway. Infiltrating tumor should be excluded    CT chest was negative for pulmonary embolism no acute findings        Assessment:     laryngeal swelling with mild stridor  COVID-19 infection without significant finding on CAT scan  No evidence of pulmonary embolism        Recommendations:  Steroids Decadron 6 mg daily  Antibiotics Rocephin patient received 1 dose of azithromycin  DC lisinopril for possible late onset angioedema  Lovenox DVT prophylaxis   racemic epinephrine as needed   diuresis as needed            Review of Sytems:  Review of Systems   Respiratory: Positive for cough, choking and stridor.        Past Medical History:  Past Medical History:   Diagnosis Date   • Arthritis     Rheumatoid   • Disease of thyroid gland    • Hyperlipidemia    • Hypertension    • PONV (postoperative nausea and vomiting)        Past Surgical History:  Past Surgical History:   Procedure Laterality Date   • ABDOMINAL SURGERY    "  • CHOLECYSTECTOMY     • FINGER SURGERY     • HYSTERECTOMY     • INCISION AND DRAINAGE OF WOUND Right 4/6/2021    Procedure: Incision and drainage of abscess of right face/cheek;  Surgeon: Ariel Hernandes DO;  Location: [facility] MAIN OR;  Service: General;  Laterality: Right;   • OOPHORECTOMY     • THYROID SURGERY     • TUBAL ABDOMINAL LIGATION      1 tube removed        Home Meds:  (Not in a hospital admission)      Allergies:  No Known Allergies    Social History:   Social History     Socioeconomic History   • Marital status:    Tobacco Use   • Smoking status: Never Smoker   • Smokeless tobacco: Never Used   Vaping Use   • Vaping Use: Never used   Substance and Sexual Activity   • Alcohol use: Not Currently     Comment: rarely   • Drug use: Never   • Sexual activity: Defer       Family History:  Family History   Problem Relation Age of Onset   • Diabetes Mother    • COPD Father        Physical Exam:  /90 (BP Location: Right arm, Patient Position: Sitting)   Pulse 94   Temp 99.2 °F (37.3 °C) (Oral)   Resp 18   Ht 165.1 cm (65\")   Wt 77.1 kg (170 lb)   SpO2 95%   BMI 28.29 kg/m²  Body mass index is 28.29 kg/m². 95% 77.1 kg (170 lb)  Physical Exam  Cardiovascular:      Heart sounds: Murmur heard.       Pulmonary:      Breath sounds: Stridor present. Rhonchi and rales present.         LABS:  Lab Results   Component Value Date    CALCIUM 8.7 02/02/2022     Results from last 7 days   Lab Units 02/02/22  1338   SODIUM mmol/L 141   POTASSIUM mmol/L 3.3*   CHLORIDE mmol/L 98   CO2 mmol/L 29.0   BUN mg/dL 7*   CREATININE mg/dL 0.85   GLUCOSE mg/dL 124*   CALCIUM mg/dL 8.7   WBC 10*3/mm3 12.70*   HEMOGLOBIN g/dL 15.1   PLATELETS 10*3/mm3 176   ALT (SGPT) U/L 62*   AST (SGOT) U/L 47*   PROBNP pg/mL 121.1   PROCALCITONIN ng/mL 0.10     Lab Results   Component Value Date    TROPONINT <0.010 02/02/2022     Results from last 7 days   Lab Units 02/02/22  1338   TROPONIN T ng/mL <0.010         Results from last " 7 days   Lab Units 02/02/22  1356 02/02/22  1338   PROCALCITONIN ng/mL  --  0.10   LACTATE mmol/L 1.6  --          Results from last 7 days   Lab Units 02/02/22  1354   ADENOVIRUS DETECTION BY PCR  Not Detected   CORONAVIRUS 229E  Not Detected   CORONAVIRUS HKU1  Not Detected   CORONAVIRUS NL63  Not Detected   CORONAVIRUS OC43  Not Detected   HUMAN METAPNEUMOVIRUS  Not Detected   HUMAN RHINOVIRUS/ENTEROVIRUS  Not Detected   INFLUENZA B PCR  Not Detected   PARAINFLUENZA 1  Not Detected   PARAINFLUENZA VIRUS 2  Not Detected   PARAINFLUENZA VIRUS 3  Not Detected   PARAINFLUENZA VIRUS 4  Not Detected   BORDETELLA PERTUSSIS PCR  Not Detected   CHLAMYDOPHILA PNEUMONIAE PCR  Not Detected   MYCOPLAMA PNEUMO PCR  Not Detected   INFLUENZA A PCR  Not Detected   RSV, PCR  Not Detected             Lab Results   Component Value Date    TSH 4.470 (H) 04/06/2021     Estimated Creatinine Clearance: 67.7 mL/min (by C-G formula based on SCr of 0.85 mg/dL).         Imaging:  Imaging Results (Last 24 Hours)     Procedure Component Value Units Date/Time    CT Soft Tissue Neck With Contrast [[id number]] Collected: 02/02/22 1601     Updated: 02/02/22 1615    Narrative:         DATE OF EXAM:   2/2/2022 3:34 PM     PROCEDURE:   CT SOFT TISSUE NECK W CONTRAST-     INDICATIONS:   r/o supraglottis     COMPARISON:  No Comparisons Available     TECHNIQUE:   After the intravenous administration of  75 mL of Isovue 370, contiguous  axial images were obtained through the neck. Coronal and sagittal  reconstructions were performed. Automated exposure control and iterative  reconstruction methods were used.      FINDINGS:   No prevertebral soft tissue thickening. No abnormal retropharyngeal  fluid collection. Epiglottis normal in thickness. Thickening of the  aryepiglottic folds and vocal cords with irregular mucosal enhancement  and severe narrowing of the airway. Subglottis patent. The arytenoid  cartilages are not well demonstrated although the right  is partially  calcified. Parotid and submandibular glands unremarkable. Status post  left hemithyroidectomy. Diffuse enlargement of the right thyroid gland  with subcentimeter nodules. Muscle and fat planes in the floor the mouth  preserved. Borderline enlarged bilateral level 2 cervical lymph nodes.  Imaged paranasal sinuses and middle ear cavities clear. Visualized lower  brain unremarkable. Degenerative disc disease with prominent dorsal  spurring at C4-C5 and C5-C6, and bulky anterior osteophytes at C3-C4 and  C4-C5. No acute bony abnormality demonstrated.       Impression:      IMPRESSION :   Irregular thickening of the vocal cords and aryepiglottic folds. This  results in severe narrowing of the airway. Infiltrating tumor should be  excluded. Recommend direct visualization. There are borderline enlarged  level 2 internal jugular chain lymph nodes.     Electronically Signed By-Kirill Garrett On:2/2/2022 4:12 PM  This report was finalized on [id number] by  Kirill Garrett, .    CT Chest Pulmonary Embolism [[id number]] Collected: 02/02/22 1555     Updated: 02/02/22 1602    Narrative:         DATE OF EXAM:  2/2/2022 3:34 PM     PROCEDURE:  CT CHEST PULMONARY EMBOLISM-     INDICATIONS:   PE suspected, high prob     COMPARISON:   No Comparisons Available     TECHNIQUE:  Routine transaxial slices were obtained through chest after  administration of intravenous 75 ml of Isovue 370. Reconstructed coronal  and sagittal images were also obtained. Automated exposure control and  iterative reconstruction methods were used.      FINDINGS:  Pulmonary arteries: Pulmonary arteries are adequately opacified. There  is slight motion distortion in the lung bases more on the left. Within  that limitation, no emboli are demonstrated     Mediastinum: Mildly enlarged right thyroid lobe extending into the  thoracic inlet with no nodule demonstrated. Thoracic aorta normal in  caliber. No mediastinal adenopathy. Cardiomegaly. Trace  pericardial  effusion.     Lungs/pleura: No suspicious infiltrate. Scarring versus chronic  atelectasis in the right middle lobe, lingula, and left lower lobe. No  pleural effusion     Upper abdomen: No acute abnormality. Fatty infiltration of the liver     Bones/soft tissues: No acute bony abnormality       Impression:         1. No evidence of pulmonary embolism  2. No acute pulmonary abnormality     Electronically Signed By-Kirill Garrett On:2/2/2022 4:00 PM  This report was finalized on [id number] by  Kirill Garrett, .            Current Meds:   SCHEDULE  ascorbic acid, 500 mg, Oral, Daily  atorvastatin, 10 mg, Oral, Daily  azithromycin, 500 mg, Intravenous, Q24H  cefTRIAXone, 1 g, Intravenous, Q24H  [START ON 2/3/2022] dexamethasone, 6 mg, Intravenous, Daily  enoxaparin, 40 mg, Subcutaneous, Daily  [START ON 2/3/2022] levothyroxine, 25 mcg, Oral, Q AM  lisinopril-hydroCHLOROthiazide (ZESTORETIC) 10-12.5 mg combo dose, , Oral, Q24H  sodium chloride, 3 mL, Intravenous, Q12H  zinc sulfate, 220 mg, Oral, Daily      Infusions  Pharmacy to Dose enoxaparin (LOVENOX),       PRNs  Pharmacy to Dose enoxaparin (LOVENOX)  •  sodium chloride  •  sodium chloride        Jhon Singh MD  2/2/2022  18:59 EST      Much of this encounter note is an electronic transcription/translation of spoken language to printed text using Dragon Software.

## 2022-02-02 NOTE — ED NOTES
Pt c/o sore throat, cough, fever, and feeling like something is stuck in her throat onset 2 days ago; states  has covid pneumonia and she tested + covid at home this AM.     Meredith Dove, RN  02/02/22 3703

## 2022-02-03 PROCEDURE — G0378 HOSPITAL OBSERVATION PER HR: HCPCS

## 2022-02-03 PROCEDURE — 94640 AIRWAY INHALATION TREATMENT: CPT

## 2022-02-03 PROCEDURE — 94799 UNLISTED PULMONARY SVC/PX: CPT

## 2022-02-03 PROCEDURE — 25010000002 AZITHROMYCIN PER 500 MG: Performed by: EMERGENCY MEDICINE

## 2022-02-03 PROCEDURE — 25010000002 CEFTRIAXONE PER 250 MG: Performed by: EMERGENCY MEDICINE

## 2022-02-03 PROCEDURE — 25010000002 DEXAMETHASONE PER 1 MG: Performed by: FAMILY MEDICINE

## 2022-02-03 PROCEDURE — 87081 CULTURE SCREEN ONLY: CPT | Performed by: INTERNAL MEDICINE

## 2022-02-03 PROCEDURE — 25010000002 ENOXAPARIN PER 10 MG: Performed by: FAMILY MEDICINE

## 2022-02-03 RX ORDER — BUDESONIDE AND FORMOTEROL FUMARATE DIHYDRATE 160; 4.5 UG/1; UG/1
2 AEROSOL RESPIRATORY (INHALATION)
Status: DISCONTINUED | OUTPATIENT
Start: 2022-02-03 | End: 2022-02-05 | Stop reason: HOSPADM

## 2022-02-03 RX ORDER — GUAIFENESIN 600 MG/1
600 TABLET, EXTENDED RELEASE ORAL EVERY 12 HOURS SCHEDULED
Status: DISCONTINUED | OUTPATIENT
Start: 2022-02-03 | End: 2022-02-05 | Stop reason: HOSPADM

## 2022-02-03 RX ORDER — PANTOPRAZOLE SODIUM 40 MG/1
40 TABLET, DELAYED RELEASE ORAL
Status: DISCONTINUED | OUTPATIENT
Start: 2022-02-03 | End: 2022-02-05 | Stop reason: HOSPADM

## 2022-02-03 RX ADMIN — OXYCODONE HYDROCHLORIDE AND ACETAMINOPHEN 500 MG: 500 TABLET ORAL at 08:59

## 2022-02-03 RX ADMIN — ENOXAPARIN SODIUM 40 MG: 40 INJECTION SUBCUTANEOUS at 15:52

## 2022-02-03 RX ADMIN — Medication 3 ML: at 19:41

## 2022-02-03 RX ADMIN — CEFTRIAXONE SODIUM 1 G: 10 INJECTION, POWDER, FOR SOLUTION INTRAVENOUS at 17:50

## 2022-02-03 RX ADMIN — LEVOTHYROXINE SODIUM 25 MCG: 0.03 TABLET ORAL at 05:20

## 2022-02-03 RX ADMIN — AZITHROMYCIN MONOHYDRATE 500 MG: 500 INJECTION, POWDER, LYOPHILIZED, FOR SOLUTION INTRAVENOUS at 17:44

## 2022-02-03 RX ADMIN — BUDESONIDE AND FORMOTEROL FUMARATE DIHYDRATE 2 PUFF: 160; 4.5 AEROSOL RESPIRATORY (INHALATION) at 18:15

## 2022-02-03 RX ADMIN — DEXAMETHASONE SODIUM PHOSPHATE 6 MG: 4 INJECTION, SOLUTION INTRA-ARTICULAR; INTRALESIONAL; INTRAMUSCULAR; INTRAVENOUS; SOFT TISSUE at 08:59

## 2022-02-03 RX ADMIN — GUAIFENESIN 600 MG: 600 TABLET, EXTENDED RELEASE ORAL at 19:40

## 2022-02-03 RX ADMIN — PANTOPRAZOLE SODIUM 40 MG: 40 TABLET, DELAYED RELEASE ORAL at 15:52

## 2022-02-03 RX ADMIN — Medication 3 ML: at 08:59

## 2022-02-03 RX ADMIN — ATORVASTATIN CALCIUM 10 MG: 10 TABLET, FILM COATED ORAL at 19:40

## 2022-02-03 RX ADMIN — ZINC SULFATE 220 MG (50 MG) CAPSULE 220 MG: CAPSULE at 08:59

## 2022-02-03 NOTE — PLAN OF CARE
Goal Outcome Evaluation:  Plan of Care Reviewed With: patient           Outcome Summary: pt abed this shift. No c/o pain/discomfort, no s/sx of distress noted. Pt c/o feeling like 'something is stuck in her throat', MD aware plans to monitor pt. VSS, will cont to follow care plan.

## 2022-02-03 NOTE — PROGRESS NOTES
Daily Progress Note        Lower respiratory tract infection due to COVID-19 virus      Assessment    laryngeal swelling with mild stridor  COVID-19 infection without significant finding on CAT scan  No evidence of pulmonary embolism     Recommendations:    Steroids Decadron 6 mg daily  Antibiotics Rocephin patient received 1 dose of azithromycin  DC lisinopril for possible late onset angioedema  Lovenox DVT prophylaxis  racemic epinephrine as needed  diuresis as needed               COVID-19 LAB PANEL     COVID-19 test result  COVID19   Date Value Ref Range Status   02/02/2022 Detected (C) Not Detected - Ref. Range Final       COVID-19 Prognostic lab results  WBC with differential  Results from last 7 days   Lab Units 02/02/22  1338   WBC 10*3/mm3 12.70*   PLATELETS 10*3/mm3 176   NEUTROPHIL % % 76.8*   LYMPHOCYTE % % 11.1*   NEUTROS ABS 10*3/mm3 9.80*   LYMPHS ABS 10*3/mm3 1.40     Inflammatory markers  Results from last 7 days   Lab Units 02/02/22  1338   PROCALCITONIN ng/mL 0.10   ALK PHOS U/L 89   AST (SGOT) U/L 47*   ALT (SGPT) U/L 62*   TROPONIN T ng/mL <0.010       Poor COVID-19 outcomes associated with:  Neutrophil:Lymphocyte ratio >3.5  Thrombocytopenia, lymphopenia  LFT's >5x upper limit of normal  LDH >400     LOS: 0 days     Subjective         Objective     Vital signs for last 24 hours:  Vitals:    02/02/22 2212 02/03/22 0054 02/03/22 0159 02/03/22 0500   BP: 140/77 162/83 130/58 160/79   BP Location: Right arm  Right arm Left arm   Patient Position: Lying  Lying Lying   Pulse: 105 86 90 89   Resp: 18  19 19   Temp: 98.2 °F (36.8 °C)  97 °F (36.1 °C) 98.4 °F (36.9 °C)   TempSrc: Oral  Oral Oral   SpO2: 95% 95% 96% 97%   Weight:       Height:           Intake/Output last 3 shifts:  No intake/output data recorded.  Intake/Output this shift:  No intake/output data recorded.      Radiology  Imaging Results (Last 24 Hours)     Procedure Component Value Units Date/Time    CT Soft Tissue Neck With Contrast  [[id number]] Collected: 02/02/22 1601     Updated: 02/02/22 1615    Narrative:         DATE OF EXAM:   2/2/2022 3:34 PM     PROCEDURE:   CT SOFT TISSUE NECK W CONTRAST-     INDICATIONS:   r/o supraglottis     COMPARISON:  No Comparisons Available     TECHNIQUE:   After the intravenous administration of  75 mL of Isovue 370, contiguous  axial images were obtained through the neck. Coronal and sagittal  reconstructions were performed. Automated exposure control and iterative  reconstruction methods were used.      FINDINGS:   No prevertebral soft tissue thickening. No abnormal retropharyngeal  fluid collection. Epiglottis normal in thickness. Thickening of the  aryepiglottic folds and vocal cords with irregular mucosal enhancement  and severe narrowing of the airway. Subglottis patent. The arytenoid  cartilages are not well demonstrated although the right is partially  calcified. Parotid and submandibular glands unremarkable. Status post  left hemithyroidectomy. Diffuse enlargement of the right thyroid gland  with subcentimeter nodules. Muscle and fat planes in the floor the mouth  preserved. Borderline enlarged bilateral level 2 cervical lymph nodes.  Imaged paranasal sinuses and middle ear cavities clear. Visualized lower  brain unremarkable. Degenerative disc disease with prominent dorsal  spurring at C4-C5 and C5-C6, and bulky anterior osteophytes at C3-C4 and  C4-C5. No acute bony abnormality demonstrated.       Impression:      IMPRESSION :   Irregular thickening of the vocal cords and aryepiglottic folds. This  results in severe narrowing of the airway. Infiltrating tumor should be  excluded. Recommend direct visualization. There are borderline enlarged  level 2 internal jugular chain lymph nodes.     Electronically Signed By-Kirill Garrett On:2/2/2022 4:12 PM  This report was finalized on [id number] by  Kirill Garrett, .    CT Chest Pulmonary Embolism [[id number]] Collected: 02/02/22 1555     Updated:  02/02/22 1602    Narrative:         DATE OF EXAM:  2/2/2022 3:34 PM     PROCEDURE:  CT CHEST PULMONARY EMBOLISM-     INDICATIONS:   PE suspected, high prob     COMPARISON:   No Comparisons Available     TECHNIQUE:  Routine transaxial slices were obtained through chest after  administration of intravenous 75 ml of Isovue 370. Reconstructed coronal  and sagittal images were also obtained. Automated exposure control and  iterative reconstruction methods were used.      FINDINGS:  Pulmonary arteries: Pulmonary arteries are adequately opacified. There  is slight motion distortion in the lung bases more on the left. Within  that limitation, no emboli are demonstrated     Mediastinum: Mildly enlarged right thyroid lobe extending into the  thoracic inlet with no nodule demonstrated. Thoracic aorta normal in  caliber. No mediastinal adenopathy. Cardiomegaly. Trace pericardial  effusion.     Lungs/pleura: No suspicious infiltrate. Scarring versus chronic  atelectasis in the right middle lobe, lingula, and left lower lobe. No  pleural effusion     Upper abdomen: No acute abnormality. Fatty infiltration of the liver     Bones/soft tissues: No acute bony abnormality       Impression:         1. No evidence of pulmonary embolism  2. No acute pulmonary abnormality     Electronically Signed By-Kirill Garrett On:2/2/2022 4:00 PM  This report was finalized on [id number] by  Kirill Garrett, .          Labs:  Results from last 7 days   Lab Units 02/02/22  1338   WBC 10*3/mm3 12.70*   HEMOGLOBIN g/dL 15.1   HEMATOCRIT % 45.4   PLATELETS 10*3/mm3 176     Results from last 7 days   Lab Units 02/02/22  1338   SODIUM mmol/L 141   POTASSIUM mmol/L 3.3*   CHLORIDE mmol/L 98   CO2 mmol/L 29.0   BUN mg/dL 7*   CREATININE mg/dL 0.85   CALCIUM mg/dL 8.7   BILIRUBIN mg/dL 0.4   ALK PHOS U/L 89   ALT (SGPT) U/L 62*   AST (SGOT) U/L 47*   GLUCOSE mg/dL 124*         Results from last 7 days   Lab Units 02/02/22  1338   ALBUMIN g/dL 4.50      Results from last 7 days   Lab Units 02/02/22  1338   TROPONIN T ng/mL <0.010                           Meds:   SCHEDULE  ascorbic acid, 500 mg, Oral, Daily  atorvastatin, 10 mg, Oral, Nightly  azithromycin, 500 mg, Intravenous, Q24H  cefTRIAXone, 1 g, Intravenous, Q24H  dexamethasone, 6 mg, Intravenous, Daily  enoxaparin, 40 mg, Subcutaneous, Daily  levothyroxine, 25 mcg, Oral, Q AM  sodium chloride, 3 mL, Intravenous, Q12H  zinc sulfate, 220 mg, Oral, Daily      Infusions  Pharmacy to Dose enoxaparin (LOVENOX),       PRNs  Pharmacy to Dose enoxaparin (LOVENOX)  •  sodium chloride  •  sodium chloride    Physical Exam:  Physical Exam  Vitals reviewed.   Pulmonary:      Breath sounds: Rhonchi present.   Skin:     General: Skin is warm and dry.   Neurological:      Mental Status: She is alert and oriented to person, place, and time.         ROS  Review of Systems   HENT:        Sore throat   Respiratory: Positive for cough.        I reviewed the patient's new clinical results    Electronically signed by WENDY Wallis

## 2022-02-03 NOTE — H&P
Patient Care Team:  Ariel Rider MD as PCP - General    Chief complaint  SOA- improved     Subjective     Patient is a 66 y.o. female presents with  soa  Onset of symptoms was 2 days ago. She had fever and chills and on the am of 2/2 woke up with a sore throat and hoarseness.Er found her to be CVID positive. To note, her  is currently inpt at [facility] with severe COVID illness. She has had 2 vaccines doses.   CT soft tissue neck showed thickening of the vocal cords and aryepiglottic folds, severe airway narrowing and  borderline enlarged internal jugular lymph nodes. CT chest with no PE or acute pulmonary abnormality. She has been admitted for further work up. Pulmonology was consulted.     Review of Systems   Constitutional: Positive for activity change, appetite change, fatigue and fever.   HENT: Positive for voice change (hoarseness- new onset).    Respiratory: Positive for shortness of breath (resolved today ). Negative for cough and chest tightness.    Cardiovascular: Negative for chest pain, palpitations and leg swelling.   Gastrointestinal: Negative for abdominal pain, diarrhea and nausea.   Genitourinary: Negative for difficulty urinating.   Musculoskeletal: Positive for arthralgias. Negative for gait problem.   Neurological: Negative for headaches.   Psychiatric/Behavioral: Negative for confusion.          History  Past Medical History:   Diagnosis Date   • Arthritis     Rheumatoid   • Disease of thyroid gland    • Hyperlipidemia    • Hypertension    • PONV (postoperative nausea and vomiting)      Past Surgical History:   Procedure Laterality Date   • ABDOMINAL SURGERY     • CHOLECYSTECTOMY     • FINGER SURGERY     • HYSTERECTOMY     • INCISION AND DRAINAGE OF WOUND Right 4/6/2021    Procedure: Incision and drainage of abscess of right face/cheek;  Surgeon: Ariel Hernandes DO;  Location: [facility] MAIN OR;  Service: General;  Laterality: Right;   • OOPHORECTOMY     • THYROID SURGERY     • TUBAL ABDOMINAL  LIGATION      1 tube removed     Family History   Problem Relation Age of Onset   • Diabetes Mother    • COPD Father      Social History     Tobacco Use   • Smoking status: Never Smoker   • Smokeless tobacco: Never Used   Vaping Use   • Vaping Use: Never used   Substance Use Topics   • Alcohol use: Not Currently     Comment: rarely   • Drug use: Never     Medications Prior to Admission   Medication Sig Dispense Refill Last Dose   • atorvastatin (Lipitor) 10 MG tablet LIPITOR 10 MG TABS   Past Week at Unknown time   • levothyroxine (Synthroid) 25 MCG tablet SYNTHROID 25 MCG TABS   Past Week at Unknown time   • lisinopril-hydrochlorothiazide (PRINZIDE,ZESTORETIC) 10-12.5 MG per tablet LISINOPRIL-HYDROCHLOROTHIAZIDE 10-12.5 MG TABS   Past Week at Unknown time   • meloxicam (Mobic) 7.5 MG tablet MOBIC 7.5 MG TABS   Past Week at Unknown time   • colesevelam (WELCHOL) 3.75 g pack pack Take  by mouth As Needed.   Unknown at Unknown time     Allergies:  Patient has no known allergies.    Objective     Vital Signs  Temp:  [97 °F (36.1 °C)-98.8 °F (37.1 °C)] 98.8 °F (37.1 °C)  Heart Rate:  [] 72  Resp:  [18-19] 18  BP: (130-173)/(58-98) 162/70     Physical Exam:      General Appearance:    Alert, cooperative, in no acute distress   Head:    Normocephalic, without obvious abnormality, atraumatic   Eyes:            Lids and lashes normal, conjunctivae and sclerae normal, no   icterus, no pallor, corneas clear, PERRLA   Ears:    Ears appear intact with no abnormalities noted   Throat:   No oral lesions, no thrush, oral mucosa moist   Neck:   No adenopathy, supple, trachea midline, no thyromegaly, no   carotid bruit, no JVD   Lungs:     No Stridor , exp wheezing ,respirations regular, even and                  unlabored    Heart:    Regular rhythm and normal rate, normal S1 and S2, no            murmur, no gallop, no rub, no click   Chest Wall:    No abnormalities observed   Abdomen:     Normal bowel sounds, no masses, no  organomegaly, soft        non-tender, non-distended, no guarding, no rebound                tenderness   Extremities:   Moves all extremities well, no edema, no cyanosis, no             redness   Pulses:   Pulses palpable and equal bilaterally   Skin:   No bleeding, bruising or rash   Lymph nodes:   No palpable adenopathy   Neurologic:   Cranial nerves 2 - 12 grossly intact, sensation intact, DTR       present and equal bilaterally       Results Review:     Imaging Results (Last 24 Hours)     Procedure Component Value Units Date/Time    CT Soft Tissue Neck With Contrast [[id number]] Collected: 02/02/22 1601     Updated: 02/02/22 1615    Narrative:         DATE OF EXAM:   2/2/2022 3:34 PM     PROCEDURE:   CT SOFT TISSUE NECK W CONTRAST-     INDICATIONS:   r/o supraglottis     COMPARISON:  No Comparisons Available     TECHNIQUE:   After the intravenous administration of  75 mL of Isovue 370, contiguous  axial images were obtained through the neck. Coronal and sagittal  reconstructions were performed. Automated exposure control and iterative  reconstruction methods were used.      FINDINGS:   No prevertebral soft tissue thickening. No abnormal retropharyngeal  fluid collection. Epiglottis normal in thickness. Thickening of the  aryepiglottic folds and vocal cords with irregular mucosal enhancement  and severe narrowing of the airway. Subglottis patent. The arytenoid  cartilages are not well demonstrated although the right is partially  calcified. Parotid and submandibular glands unremarkable. Status post  left hemithyroidectomy. Diffuse enlargement of the right thyroid gland  with subcentimeter nodules. Muscle and fat planes in the floor the mouth  preserved. Borderline enlarged bilateral level 2 cervical lymph nodes.  Imaged paranasal sinuses and middle ear cavities clear. Visualized lower  brain unremarkable. Degenerative disc disease with prominent dorsal  spurring at C4-C5 and C5-C6, and bulky anterior osteophytes  at C3-C4 and  C4-C5. No acute bony abnormality demonstrated.       Impression:      IMPRESSION :   Irregular thickening of the vocal cords and aryepiglottic folds. This  results in severe narrowing of the airway. Infiltrating tumor should be  excluded. Recommend direct visualization. There are borderline enlarged  level 2 internal jugular chain lymph nodes.     Electronically Signed ByDelia Garrett On:2/2/2022 4:12 PM  This report was finalized on [id number] by  Kirill Garrett, .    CT Chest Pulmonary Embolism [[id number]] Collected: 02/02/22 1555     Updated: 02/02/22 1602    Narrative:         DATE OF EXAM:  2/2/2022 3:34 PM     PROCEDURE:  CT CHEST PULMONARY EMBOLISM-     INDICATIONS:   PE suspected, high prob     COMPARISON:   No Comparisons Available     TECHNIQUE:  Routine transaxial slices were obtained through chest after  administration of intravenous 75 ml of Isovue 370. Reconstructed coronal  and sagittal images were also obtained. Automated exposure control and  iterative reconstruction methods were used.      FINDINGS:  Pulmonary arteries: Pulmonary arteries are adequately opacified. There  is slight motion distortion in the lung bases more on the left. Within  that limitation, no emboli are demonstrated     Mediastinum: Mildly enlarged right thyroid lobe extending into the  thoracic inlet with no nodule demonstrated. Thoracic aorta normal in  caliber. No mediastinal adenopathy. Cardiomegaly. Trace pericardial  effusion.     Lungs/pleura: No suspicious infiltrate. Scarring versus chronic  atelectasis in the right middle lobe, lingula, and left lower lobe. No  pleural effusion     Upper abdomen: No acute abnormality. Fatty infiltration of the liver     Bones/soft tissues: No acute bony abnormality       Impression:         1. No evidence of pulmonary embolism  2. No acute pulmonary abnormality     Electronically Signed ByDelia Garrett On:2/2/2022 4:00 PM  This report was finalized on  [id number] by  Kirill Garrett, .           Lab Results (last 24 hours)     Procedure Component Value Units Date/Time    Blood Culture - Blood, Arm, Left [[id number]]  (Normal) Collected: 02/02/22 1338    Specimen: Blood from Arm, Left Updated: 02/03/22 1401     Blood Culture No growth at 24 hours    Blood Culture - Blood, Arm, Left [[id number]] Collected: 02/02/22 1606    Specimen: Blood from Arm, Left Updated: 02/02/22 1615           I reviewed the patient's new clinical results.    Assessment/Plan       Lower respiratory tract infection due to COVID-19 virus      Laryngeal Swelling - Improved. IV decadron 6 mg qd, rocephin and zithromax. Lisinopril was stopped for possible late onset angioedema. Dr Singh is following. Pt will need direct visualization at some point. She is without hypoxia and improved with steroids.       COVID 19- CT chest with no acute findings. Adding in symbicort and mucinex..     Hypothyroidism- continue replacement.     HLD- continue statin.     Lovenox for DVT prophylaxis. PPI for GI protection.       I discussed the patients findings and my recommendations with patient.     Jacquelin Dudley, WENDY  02/03/22  14:58 EST

## 2022-02-03 NOTE — PLAN OF CARE
"Goal Outcome Evaluation:  Plan of Care Reviewed With: patient        Progress: no change    Patient arrived by wheel chair from the ED. She appears to be steady on her feet and has been up already to urinate and reports no difficulty.  She has a mild hoarseness and complaints of soreness in her throat with an 8 for pain. She states her  is upstairs with a worse case of covid but she is concerned about the throat swelling and hoarseness. She stated that she feels \"ok\" other than that. She reported shortness of breath but her o2 was at 97%, she states  the shortness of breath is more in her throat than in her lungs. Skin is intact and dry. She has no complaints other than her throat. The patient appears to be resting comfortably in bed.  Care plan is ongoing.   "

## 2022-02-04 LAB
ANION GAP SERPL CALCULATED.3IONS-SCNC: 10 MMOL/L (ref 5–15)
BASOPHILS # BLD AUTO: 0.1 10*3/MM3 (ref 0–0.2)
BASOPHILS NFR BLD AUTO: 0.7 % (ref 0–1.5)
BUN SERPL-MCNC: 21 MG/DL (ref 8–23)
BUN/CREAT SERPL: 24.7 (ref 7–25)
CALCIUM SPEC-SCNC: 8.2 MG/DL (ref 8.6–10.5)
CHLORIDE SERPL-SCNC: 103 MMOL/L (ref 98–107)
CO2 SERPL-SCNC: 29 MMOL/L (ref 22–29)
CREAT SERPL-MCNC: 0.85 MG/DL (ref 0.57–1)
DEPRECATED RDW RBC AUTO: 42.9 FL (ref 37–54)
EOSINOPHIL # BLD AUTO: 0 10*3/MM3 (ref 0–0.4)
EOSINOPHIL NFR BLD AUTO: 0.1 % (ref 0.3–6.2)
ERYTHROCYTE [DISTWIDTH] IN BLOOD BY AUTOMATED COUNT: 14 % (ref 12.3–15.4)
GFR SERPL CREATININE-BSD FRML MDRD: 67 ML/MIN/1.73
GLUCOSE BLDC GLUCOMTR-MCNC: 147 MG/DL (ref 70–105)
GLUCOSE SERPL-MCNC: 198 MG/DL (ref 65–99)
HCT VFR BLD AUTO: 38.5 % (ref 34–46.6)
HGB BLD-MCNC: 13.1 G/DL (ref 12–15.9)
LYMPHOCYTES # BLD AUTO: 1.4 10*3/MM3 (ref 0.7–3.1)
LYMPHOCYTES NFR BLD AUTO: 18.5 % (ref 19.6–45.3)
MCH RBC QN AUTO: 29.6 PG (ref 26.6–33)
MCHC RBC AUTO-ENTMCNC: 33.9 G/DL (ref 31.5–35.7)
MCV RBC AUTO: 87.4 FL (ref 79–97)
MONOCYTES # BLD AUTO: 0.6 10*3/MM3 (ref 0.1–0.9)
MONOCYTES NFR BLD AUTO: 7.9 % (ref 5–12)
NEUTROPHILS NFR BLD AUTO: 5.6 10*3/MM3 (ref 1.7–7)
NEUTROPHILS NFR BLD AUTO: 72.8 % (ref 42.7–76)
NRBC BLD AUTO-RTO: 0.1 /100 WBC (ref 0–0.2)
PLATELET # BLD AUTO: 183 10*3/MM3 (ref 140–450)
PMV BLD AUTO: 9 FL (ref 6–12)
POTASSIUM SERPL-SCNC: 3.4 MMOL/L (ref 3.5–5.2)
RBC # BLD AUTO: 4.41 10*6/MM3 (ref 3.77–5.28)
SODIUM SERPL-SCNC: 142 MMOL/L (ref 136–145)
WBC NRBC COR # BLD: 7.7 10*3/MM3 (ref 3.4–10.8)

## 2022-02-04 PROCEDURE — 94799 UNLISTED PULMONARY SVC/PX: CPT

## 2022-02-04 PROCEDURE — 80048 BASIC METABOLIC PNL TOTAL CA: CPT | Performed by: FAMILY MEDICINE

## 2022-02-04 PROCEDURE — 82962 GLUCOSE BLOOD TEST: CPT

## 2022-02-04 PROCEDURE — 25010000002 CEFTRIAXONE PER 250 MG: Performed by: FAMILY MEDICINE

## 2022-02-04 PROCEDURE — 25010000002 ENOXAPARIN PER 10 MG: Performed by: FAMILY MEDICINE

## 2022-02-04 PROCEDURE — 85025 COMPLETE CBC W/AUTO DIFF WBC: CPT | Performed by: FAMILY MEDICINE

## 2022-02-04 PROCEDURE — 25010000002 DEXAMETHASONE PER 1 MG: Performed by: FAMILY MEDICINE

## 2022-02-04 RX ORDER — LEVOTHYROXINE SODIUM 112 UG/1
112 TABLET ORAL ONCE
Status: COMPLETED | OUTPATIENT
Start: 2022-02-04 | End: 2022-02-04

## 2022-02-04 RX ORDER — COLESEVELAM 180 1/1
625 TABLET ORAL DAILY PRN
COMMUNITY

## 2022-02-04 RX ORDER — DEXAMETHASONE 4 MG/1
6 TABLET ORAL
Status: DISCONTINUED | OUTPATIENT
Start: 2022-02-05 | End: 2022-02-05 | Stop reason: HOSPADM

## 2022-02-04 RX ORDER — AZITHROMYCIN 250 MG/1
500 TABLET, FILM COATED ORAL
Status: DISCONTINUED | OUTPATIENT
Start: 2022-02-04 | End: 2022-02-05 | Stop reason: HOSPADM

## 2022-02-04 RX ORDER — MELOXICAM 7.5 MG/1
7.5 TABLET ORAL DAILY PRN
COMMUNITY

## 2022-02-04 RX ADMIN — PANTOPRAZOLE SODIUM 40 MG: 40 TABLET, DELAYED RELEASE ORAL at 05:03

## 2022-02-04 RX ADMIN — DEXAMETHASONE SODIUM PHOSPHATE 6 MG: 4 INJECTION, SOLUTION INTRA-ARTICULAR; INTRALESIONAL; INTRAMUSCULAR; INTRAVENOUS; SOFT TISSUE at 09:25

## 2022-02-04 RX ADMIN — CEFTRIAXONE 1 G: 1 INJECTION, POWDER, FOR SOLUTION INTRAMUSCULAR; INTRAVENOUS at 17:10

## 2022-02-04 RX ADMIN — ENOXAPARIN SODIUM 40 MG: 40 INJECTION SUBCUTANEOUS at 17:11

## 2022-02-04 RX ADMIN — BUDESONIDE AND FORMOTEROL FUMARATE DIHYDRATE 2 PUFF: 160; 4.5 AEROSOL RESPIRATORY (INHALATION) at 08:13

## 2022-02-04 RX ADMIN — Medication 3 ML: at 09:26

## 2022-02-04 RX ADMIN — LEVOTHYROXINE SODIUM 25 MCG: 0.03 TABLET ORAL at 05:03

## 2022-02-04 RX ADMIN — ATORVASTATIN CALCIUM 10 MG: 10 TABLET, FILM COATED ORAL at 21:35

## 2022-02-04 RX ADMIN — GUAIFENESIN 600 MG: 600 TABLET, EXTENDED RELEASE ORAL at 21:35

## 2022-02-04 RX ADMIN — OXYCODONE HYDROCHLORIDE AND ACETAMINOPHEN 500 MG: 500 TABLET ORAL at 09:25

## 2022-02-04 RX ADMIN — LEVOTHYROXINE SODIUM 112 MCG: 0.11 TABLET ORAL at 17:19

## 2022-02-04 RX ADMIN — Medication 3 ML: at 21:36

## 2022-02-04 RX ADMIN — BUDESONIDE AND FORMOTEROL FUMARATE DIHYDRATE 2 PUFF: 160; 4.5 AEROSOL RESPIRATORY (INHALATION) at 20:02

## 2022-02-04 RX ADMIN — AZITHROMYCIN MONOHYDRATE 500 MG: 250 TABLET ORAL at 17:07

## 2022-02-04 RX ADMIN — ZINC SULFATE 220 MG (50 MG) CAPSULE 220 MG: CAPSULE at 09:25

## 2022-02-04 RX ADMIN — GUAIFENESIN 600 MG: 600 TABLET, EXTENDED RELEASE ORAL at 09:25

## 2022-02-04 NOTE — PROGRESS NOTES
LOS: 0 days   Patient Care Team:  Ariel Rider MD as PCP - General    Subjective     Interval History:     Patient Complaints: pt is feeling better. Still with some hoarseness and dysphagia    History taken from: patient    Review of Systems   Constitutional: Positive for activity change, appetite change and fatigue. Negative for fever.   HENT: Positive for trouble swallowing.    Respiratory: Positive for cough. Negative for shortness of breath.    Cardiovascular: Negative.    Gastrointestinal: Negative.    Musculoskeletal: Positive for arthralgias.   Neurological: Positive for weakness.           Objective     Vital Signs  Temp:  [97.6 °F (36.4 °C)-98.8 °F (37.1 °C)] 98.2 °F (36.8 °C)  Heart Rate:  [64-87] 75  Resp:  [16-18] 18  BP: (130-165)/(64-85) 165/66    Physical Exam:     General Appearance:    Alert, cooperative, in no acute distress   Head:    Normocephalic, without obvious abnormality, atraumatic   Eyes:            Lids and lashes normal, conjunctivae and sclerae normal, no   icterus, no pallor, corneas clear, PERRLA   Ears:    Ears appear intact with no abnormalities noted   Throat:   No oral lesions, no thrush, oral mucosa moist   Neck:   No adenopathy, supple, trachea midline, no thyromegaly, no   carotid bruit, no JVD   Lungs:     Clear to auscultation,respirations regular, even and                  unlabored    Heart:    Regular rhythm and normal rate, normal S1 and S2, no            murmur, no gallop, no rub, no click   Chest Wall:    No abnormalities observed   Abdomen:     Normal bowel sounds, no masses, no organomegaly, soft        non-tender, non-distended, no guarding, no rebound                tenderness   Extremities:   Moves all extremities well, no edema, no cyanosis, no             redness   Pulses:   Pulses palpable and equal bilaterally   Skin:   No bleeding, bruising or rash   Lymph nodes:   No palpable adenopathy   Neurologic:   Cranial nerves 2 - 12 grossly intact, sensation  intact, DTR       present and equal bilaterally        Results Review:    Lab Results (last 24 hours)     Procedure Component Value Units Date/Time    Blood Culture - Blood, Arm, Left [[id number]]  (Normal) Collected: 02/02/22 1338    Specimen: Blood from Arm, Left Updated: 02/04/22 1400     Blood Culture No growth at 2 days    Basic Metabolic Panel [[id number]]  (Abnormal) Collected: 02/04/22 1037    Specimen: Blood Updated: 02/04/22 1105     Glucose 198 mg/dL      BUN 21 mg/dL      Creatinine 0.85 mg/dL      Sodium 142 mmol/L      Potassium 3.4 mmol/L      Chloride 103 mmol/L      CO2 29.0 mmol/L      Calcium 8.2 mg/dL      eGFR Non African Amer 67 mL/min/1.73      BUN/Creatinine Ratio 24.7     Anion Gap 10.0 mmol/L     Narrative:      GFR Normal >60  Chronic Kidney Disease <60  Kidney Failure <15      CBC & Differential [[id number]]  (Abnormal) Collected: 02/04/22 1037    Specimen: Blood Updated: 02/04/22 1047    Narrative:      The following orders were created for panel order CBC & Differential.  Procedure                               Abnormality         Status                     ---------                               -----------         ------                     CBC Auto Differential[[id number]]        Abnormal            Final result                 Please view results for these tests on the individual orders.    CBC Auto Differential [[id number]]  (Abnormal) Collected: 02/04/22 1037    Specimen: Blood Updated: 02/04/22 1047     WBC 7.70 10*3/mm3      RBC 4.41 10*6/mm3      Hemoglobin 13.1 g/dL      Hematocrit 38.5 %      MCV 87.4 fL      MCH 29.6 pg      MCHC 33.9 g/dL      RDW 14.0 %      RDW-SD 42.9 fl      MPV 9.0 fL      Platelets 183 10*3/mm3      Neutrophil % 72.8 %      Lymphocyte % 18.5 %      Monocyte % 7.9 %      Eosinophil % 0.1 %      Basophil % 0.7 %      Neutrophils, Absolute 5.60 10*3/mm3      Lymphocytes, Absolute 1.40 10*3/mm3      Monocytes, Absolute 0.60 10*3/mm3      Eosinophils, Absolute  0.00 10*3/mm3      Basophils, Absolute 0.10 10*3/mm3      nRBC 0.1 /100 WBC     Throat / Upper Respiratory Culture - Swab, Throat [[id number]]  (Normal) Collected: 02/03/22 1747    Specimen: Swab from Throat Updated: 02/04/22 0835     Throat Culture, Beta Strep No Beta Hemolytic Streptococcus Isolated at 24 hrs    Narrative:      Group A Strep incidence is low in adults. Positive culture for Beta hemolytic Streptococcus species can reflect colonization and not true infection. Please correlate clinically.    POC Glucose Once [[id number]]  (Abnormal) Collected: 02/04/22 0034    Specimen: Blood Updated: 02/04/22 0036     Glucose 147 mg/dL      Comment: Serial Number: [id number]:  [id number]       Blood Culture - Blood, Arm, Left [[id number]]  (Normal) Collected: 02/02/22 1606    Specimen: Blood from Arm, Left Updated: 02/03/22 1630     Blood Culture No growth at 24 hours           Imaging Results (Last 24 Hours)     ** No results found for the last 24 hours. **               I reviewed the patient's new clinical results.    Medication Review:   Scheduled Meds:ascorbic acid, 500 mg, Oral, Daily  atorvastatin, 10 mg, Oral, Nightly  azithromycin, 500 mg, Intravenous, Q24H  budesonide-formoterol, 2 puff, Inhalation, BID - RT  cefTRIAXone, 1 g, Intravenous, Q24H  dexamethasone, 6 mg, Intravenous, Daily  enoxaparin, 40 mg, Subcutaneous, Daily  guaiFENesin, 600 mg, Oral, Q12H  levothyroxine, 25 mcg, Oral, Q AM  pantoprazole, 40 mg, Oral, Q AM  sodium chloride, 3 mL, Intravenous, Q12H  zinc sulfate, 220 mg, Oral, Daily      Continuous Infusions:Pharmacy to Dose enoxaparin (LOVENOX),       PRN Meds:.Pharmacy to Dose enoxaparin (LOVENOX)  •  sodium chloride  •  sodium chloride     Assessment/Plan       Lower respiratory tract infection due to COVID-19 virus    Laryngeal Swelling - Improved. IV decadron 6 mg qd, rocephin and zithromax. Lisinopril was stopped for possible late onset angioedema. Dr Singh is following. Pt will  need direct visualization at some point. She is without hypoxia and improved with steroids.        COVID 19- CT chest with no acute findings. Adding in symbicort and mucinex..      Hypothyroidism- continue replacement.      HLD- continue statin.      Lovenox for DVT prophylaxis. PPI for GI protection.         I discussed the patients findings and my recommendations with patient.        Plan for disposition:home when able    Vita Mccord MD  02/04/22  14:08 EST

## 2022-02-04 NOTE — PLAN OF CARE
Goal Outcome Evaluation:  Plan of Care Reviewed With: patient           Outcome Summary: pt abed this shift. no c/o pain/discomfort, no s/sx of distress noted. pt cont to verbalize concern for her  who is admitted on 2A with covid. Pt vss, will cont to follow care plan.

## 2022-02-04 NOTE — CASE MANAGEMENT/SOCIAL WORK
Discharge Planning Assessment   [facility]     Patient Name: Joi Cheng  MRN: [id number]  Today's Date: 2/4/2022    Admit Date: 2/2/2022     Discharge Needs Assessment     Row Name 02/04/22 1024       Living Environment    Lives With spouse    Current Living Arrangements home/apartment/condo    Primary Care Provided by self    Provides Primary Care For no one    Family Caregiver if Needed spouse    Quality of Family Relationships supportive       Resource/Environmental Concerns    Resource/Environmental Concerns none    Transportation Concerns car, none       Transition Planning    Patient/Family Anticipates Transition to home with family    Patient/Family Anticipated Services at Transition none    Transportation Anticipated family or friend will provide       Discharge Needs Assessment    Equipment Currently Used at Home none    Concerns to be Addressed no discharge needs identified; denies needs/concerns at this time    Anticipated Changes Related to Illness none    Equipment Needed After Discharge none    Provided Post Acute Provider List? N/A    Provided Post Acute Provider Quality & Resource List? N/A               Discharge Plan     Row Name 02/04/22 1025       Plan    Plan home    Plan Comments Spoke with patient over the phone. She denies dc needs. PCP and pharmacy confirmed              Continued Care and Services - Admitted Since 2/2/2022    Coordination has not been started for this encounter.          Demographic Summary     Row Name 02/04/22 1024       General Information    Admission Type inpatient    Arrived From emergency department    Referral Source admission list    Reason for Consult discharge planning    Preferred Language English               Functional Status     Row Name 02/04/22 1024       Functional Status    Usual Activity Tolerance good    Current Activity Tolerance moderate       Functional Status, IADL    Medications independent    Meal Preparation independent    Housekeeping  independent    Laundry independent    Shopping independent       Mental Status    General Appearance WDL WDL                                       Meredith De La Cruz, RN   Phone communication or documentation only - no physical contact with patient or family.

## 2022-02-04 NOTE — PROGRESS NOTES
Daily Progress Note        Lower respiratory tract infection due to COVID-19 virus      Assessment    laryngeal swelling with mild stridor  COVID-19 infection without significant finding on CAT scan  No evidence of pulmonary embolism     Recommendations:    Throat swab, pending    Steroids Decadron 6 mg daily  Antibiotics Rocephin patient received 1 dose of azithromycin  DC lisinopril for possible late onset angioedema  Lovenox DVT prophylaxis  racemic epinephrine as needed  diuresis as needed     Recommend follow up with ENT outpatient            COVID-19 LAB PANEL     COVID-19 test result  COVID19   Date Value Ref Range Status   02/02/2022 Detected (C) Not Detected - Ref. Range Final       COVID-19 Prognostic lab results  WBC with differential  Results from last 7 days   Lab Units 02/02/22  1338   WBC 10*3/mm3 12.70*   PLATELETS 10*3/mm3 176   NEUTROPHIL % % 76.8*   LYMPHOCYTE % % 11.1*   NEUTROS ABS 10*3/mm3 9.80*   LYMPHS ABS 10*3/mm3 1.40     Inflammatory markers  Results from last 7 days   Lab Units 02/02/22  1338   PROCALCITONIN ng/mL 0.10   ALK PHOS U/L 89   AST (SGOT) U/L 47*   ALT (SGPT) U/L 62*   TROPONIN T ng/mL <0.010       Poor COVID-19 outcomes associated with:  Neutrophil:Lymphocyte ratio >3.5  Thrombocytopenia, lymphopenia  LFT's >5x upper limit of normal  LDH >400     LOS: 0 days     Subjective         Objective     Vital signs for last 24 hours:  Vitals:    02/03/22 1815 02/03/22 1817 02/03/22 1935 02/04/22 0414   BP:   130/75 145/64   BP Location:   Left arm    Patient Position:   Sitting    Pulse: 87 87 73 66   Resp: 18 18 18 16   Temp:   97.7 °F (36.5 °C) 97.6 °F (36.4 °C)   TempSrc:   Oral    SpO2: 95% 96% 96% 97%   Weight:    76.8 kg (169 lb 5 oz)   Height:           Intake/Output last 3 shifts:  I/O last 3 completed shifts:  In: 1080 [P.O.:1080]  Out: 1350 [Urine:1350]  Intake/Output this shift:  No intake/output data recorded.      Radiology  Imaging Results (Last 24 Hours)     ** No  results found for the last 24 hours. **          Labs:  Results from last 7 days   Lab Units 02/02/22  1338   WBC 10*3/mm3 12.70*   HEMOGLOBIN g/dL 15.1   HEMATOCRIT % 45.4   PLATELETS 10*3/mm3 176     Results from last 7 days   Lab Units 02/02/22  1338   SODIUM mmol/L 141   POTASSIUM mmol/L 3.3*   CHLORIDE mmol/L 98   CO2 mmol/L 29.0   BUN mg/dL 7*   CREATININE mg/dL 0.85   CALCIUM mg/dL 8.7   BILIRUBIN mg/dL 0.4   ALK PHOS U/L 89   ALT (SGPT) U/L 62*   AST (SGOT) U/L 47*   GLUCOSE mg/dL 124*         Results from last 7 days   Lab Units 02/02/22  1338   ALBUMIN g/dL 4.50     Results from last 7 days   Lab Units 02/02/22  1338   TROPONIN T ng/mL <0.010                           Meds:   SCHEDULE  ascorbic acid, 500 mg, Oral, Daily  atorvastatin, 10 mg, Oral, Nightly  azithromycin, 500 mg, Intravenous, Q24H  budesonide-formoterol, 2 puff, Inhalation, BID - RT  cefTRIAXone, 1 g, Intravenous, Q24H  dexamethasone, 6 mg, Intravenous, Daily  enoxaparin, 40 mg, Subcutaneous, Daily  guaiFENesin, 600 mg, Oral, Q12H  levothyroxine, 25 mcg, Oral, Q AM  pantoprazole, 40 mg, Oral, Q AM  sodium chloride, 3 mL, Intravenous, Q12H  zinc sulfate, 220 mg, Oral, Daily      Infusions  Pharmacy to Dose enoxaparin (LOVENOX),       PRNs  Pharmacy to Dose enoxaparin (LOVENOX)  •  sodium chloride  •  sodium chloride    Physical Exam:  Physical Exam  Vitals reviewed.   Pulmonary:      Breath sounds: Rhonchi present.   Skin:     General: Skin is warm and dry.   Neurological:      Mental Status: She is alert and oriented to person, place, and time.         ROS  Review of Systems   HENT:        Sore throat   Respiratory: Positive for cough.        I reviewed the patient's new clinical results    Electronically signed by WENDY Wallis

## 2022-02-04 NOTE — PLAN OF CARE
Goal Outcome Evaluation:  Plan of Care Reviewed With: patient        Progress: no change       Patient is alert and oriented. She has appeared to rest well through the night and has stated that her throat discomfort has been about the same as it has been. She is up ad sunshine to the bathroom and in the room. Care plan is on going.

## 2022-02-05 ENCOUNTER — READMISSION MANAGEMENT (OUTPATIENT)
Dept: CALL CENTER | Facility: HOSPITAL | Age: 67
End: 2022-02-05

## 2022-02-05 VITALS
BODY MASS INDEX: 28.21 KG/M2 | HEART RATE: 72 BPM | DIASTOLIC BLOOD PRESSURE: 82 MMHG | RESPIRATION RATE: 18 BRPM | WEIGHT: 169.31 LBS | HEIGHT: 65 IN | OXYGEN SATURATION: 97 % | SYSTOLIC BLOOD PRESSURE: 164 MMHG | TEMPERATURE: 97.3 F

## 2022-02-05 LAB
ANION GAP SERPL CALCULATED.3IONS-SCNC: 12 MMOL/L (ref 5–15)
BASOPHILS # BLD AUTO: 0 10*3/MM3 (ref 0–0.2)
BASOPHILS NFR BLD AUTO: 0.1 % (ref 0–1.5)
BUN SERPL-MCNC: 18 MG/DL (ref 8–23)
BUN/CREAT SERPL: 22.8 (ref 7–25)
CALCIUM SPEC-SCNC: 8.2 MG/DL (ref 8.6–10.5)
CHLORIDE SERPL-SCNC: 103 MMOL/L (ref 98–107)
CO2 SERPL-SCNC: 25 MMOL/L (ref 22–29)
CREAT SERPL-MCNC: 0.79 MG/DL (ref 0.57–1)
DEPRECATED RDW RBC AUTO: 41.6 FL (ref 37–54)
EOSINOPHIL # BLD AUTO: 0 10*3/MM3 (ref 0–0.4)
EOSINOPHIL NFR BLD AUTO: 0 % (ref 0.3–6.2)
ERYTHROCYTE [DISTWIDTH] IN BLOOD BY AUTOMATED COUNT: 13.5 % (ref 12.3–15.4)
GFR SERPL CREATININE-BSD FRML MDRD: 73 ML/MIN/1.73
GLUCOSE SERPL-MCNC: 149 MG/DL (ref 65–99)
HCT VFR BLD AUTO: 38.8 % (ref 34–46.6)
HGB BLD-MCNC: 13.3 G/DL (ref 12–15.9)
LYMPHOCYTES # BLD AUTO: 1.5 10*3/MM3 (ref 0.7–3.1)
LYMPHOCYTES NFR BLD AUTO: 18.5 % (ref 19.6–45.3)
MCH RBC QN AUTO: 30.1 PG (ref 26.6–33)
MCHC RBC AUTO-ENTMCNC: 34.3 G/DL (ref 31.5–35.7)
MCV RBC AUTO: 87.7 FL (ref 79–97)
MONOCYTES # BLD AUTO: 0.7 10*3/MM3 (ref 0.1–0.9)
MONOCYTES NFR BLD AUTO: 8.9 % (ref 5–12)
NEUTROPHILS NFR BLD AUTO: 6 10*3/MM3 (ref 1.7–7)
NEUTROPHILS NFR BLD AUTO: 72.5 % (ref 42.7–76)
NRBC BLD AUTO-RTO: 0.1 /100 WBC (ref 0–0.2)
PLATELET # BLD AUTO: 197 10*3/MM3 (ref 140–450)
PMV BLD AUTO: 9.9 FL (ref 6–12)
POTASSIUM SERPL-SCNC: 3.4 MMOL/L (ref 3.5–5.2)
RBC # BLD AUTO: 4.43 10*6/MM3 (ref 3.77–5.28)
SODIUM SERPL-SCNC: 140 MMOL/L (ref 136–145)
WBC NRBC COR # BLD: 8.3 10*3/MM3 (ref 3.4–10.8)

## 2022-02-05 PROCEDURE — 94799 UNLISTED PULMONARY SVC/PX: CPT

## 2022-02-05 PROCEDURE — 85025 COMPLETE CBC W/AUTO DIFF WBC: CPT | Performed by: FAMILY MEDICINE

## 2022-02-05 PROCEDURE — 80048 BASIC METABOLIC PNL TOTAL CA: CPT | Performed by: FAMILY MEDICINE

## 2022-02-05 PROCEDURE — 63710000001 DEXAMETHASONE PER 0.25 MG: Performed by: FAMILY MEDICINE

## 2022-02-05 RX ORDER — PANTOPRAZOLE SODIUM 40 MG/1
40 TABLET, DELAYED RELEASE ORAL
Qty: 30 TABLET | Refills: 0 | Status: SHIPPED | OUTPATIENT
Start: 2022-02-06 | End: 2022-02-05

## 2022-02-05 RX ORDER — DEXAMETHASONE 4 MG/1
4 TABLET ORAL
Qty: 7 TABLET | Refills: 0 | Status: SHIPPED | OUTPATIENT
Start: 2022-02-05 | End: 2022-11-25

## 2022-02-05 RX ORDER — BUDESONIDE AND FORMOTEROL FUMARATE DIHYDRATE 160; 4.5 UG/1; UG/1
2 AEROSOL RESPIRATORY (INHALATION)
Qty: 6 G | Refills: 0 | Status: SHIPPED | OUTPATIENT
Start: 2022-02-05

## 2022-02-05 RX ORDER — DEXAMETHASONE 4 MG/1
4 TABLET ORAL
Qty: 7 TABLET | Refills: 0 | Status: SHIPPED | OUTPATIENT
Start: 2022-02-05 | End: 2022-02-05

## 2022-02-05 RX ORDER — ZINC SULFATE 50(220)MG
220 CAPSULE ORAL DAILY
Qty: 30 CAPSULE | Refills: 0 | Status: SHIPPED | OUTPATIENT
Start: 2022-02-05 | End: 2022-11-25

## 2022-02-05 RX ORDER — AZITHROMYCIN 500 MG/1
500 TABLET, FILM COATED ORAL DAILY
Qty: 3 TABLET | Refills: 0 | Status: SHIPPED | OUTPATIENT
Start: 2022-02-05 | End: 2022-11-25

## 2022-02-05 RX ORDER — PANTOPRAZOLE SODIUM 40 MG/1
40 TABLET, DELAYED RELEASE ORAL
Qty: 30 TABLET | Refills: 0 | Status: SHIPPED | OUTPATIENT
Start: 2022-02-06

## 2022-02-05 RX ORDER — BUDESONIDE AND FORMOTEROL FUMARATE DIHYDRATE 160; 4.5 UG/1; UG/1
2 AEROSOL RESPIRATORY (INHALATION)
Qty: 6 G | Refills: 0 | Status: SHIPPED | OUTPATIENT
Start: 2022-02-05 | End: 2022-02-05

## 2022-02-05 RX ORDER — AZITHROMYCIN 500 MG/1
500 TABLET, FILM COATED ORAL DAILY
Qty: 3 TABLET | Refills: 0 | Status: SHIPPED | OUTPATIENT
Start: 2022-02-05 | End: 2022-02-05

## 2022-02-05 RX ORDER — ZINC SULFATE 50(220)MG
220 CAPSULE ORAL DAILY
Qty: 30 CAPSULE | Refills: 0 | Status: SHIPPED | OUTPATIENT
Start: 2022-02-05 | End: 2022-02-05

## 2022-02-05 RX ADMIN — LEVOTHYROXINE SODIUM 137 MCG: 0.11 TABLET ORAL at 05:21

## 2022-02-05 RX ADMIN — PANTOPRAZOLE SODIUM 40 MG: 40 TABLET, DELAYED RELEASE ORAL at 05:21

## 2022-02-05 RX ADMIN — BUDESONIDE AND FORMOTEROL FUMARATE DIHYDRATE 2 PUFF: 160; 4.5 AEROSOL RESPIRATORY (INHALATION) at 07:38

## 2022-02-05 RX ADMIN — GUAIFENESIN 600 MG: 600 TABLET, EXTENDED RELEASE ORAL at 09:01

## 2022-02-05 RX ADMIN — ZINC SULFATE 220 MG (50 MG) CAPSULE 220 MG: CAPSULE at 09:01

## 2022-02-05 RX ADMIN — OXYCODONE HYDROCHLORIDE AND ACETAMINOPHEN 500 MG: 500 TABLET ORAL at 09:01

## 2022-02-05 RX ADMIN — DEXAMETHASONE 6 MG: 4 TABLET ORAL at 09:01

## 2022-02-05 NOTE — DISCHARGE SUMMARY
Date of Discharge:  2/5/2022    Discharge Diagnosis:       Lower respiratory tract infection due to COVID-19 virus   Laryngeal Swelling - possible late onset angioedema     Hypothyroidism, acquired  Dyslipidemia   Dehydration  CKD II  HTN associated with CKD II  RA +RF multiple joints    Presenting Problem/History of Present Illness  Active Hospital Problems    Diagnosis  POA   • **Lower respiratory tract infection due to COVID-19 virus [U07.1, J22]  Yes      Resolved Hospital Problems   No resolved problems to display.        Hospital Course  Patient is a 66 y.o. female who presented with acute COVID 19 LRI.  She was treated aggressively but developed acute laryngeal swelling.  Medications were modified with the discontinuation of ACE-I/ARB therapy and she improved.  She was deemed stable for D/C home today for close outpatient F/U with us within one week.      Procedures Performed         Consults:   Consults     Date and Time Order Name Status Description    2/2/2022  5:43 PM Inpatient Pulmonology Consult Completed           Pertinent Test Results:CT Soft Tissue Neck With Contrast    Result Date: 2/2/2022  IMPRESSION : Irregular thickening of the vocal cords and aryepiglottic folds. This results in severe narrowing of the airway. Infiltrating tumor should be excluded. Recommend direct visualization. There are borderline enlarged level 2 internal jugular chain lymph nodes.  Electronically Signed ByDelia Garrett On:2/2/2022 4:12 PM This report was finalized on [id number] by  Kirill Garrett, .    CT Chest Pulmonary Embolism    Result Date: 2/2/2022   1. No evidence of pulmonary embolism 2. No acute pulmonary abnormality  Electronically Signed ByDelia Garrett On:2/2/2022 4:00 PM This report was finalized on [id number] by  Kirill Garrett, Valentin      Imaging Results (Last 7 Days)     Procedure Component Value Units Date/Time    CT Soft Tissue Neck With Contrast [[id number]] Collected: 02/02/22 1601     Updated:  02/02/22 1615    Narrative:         DATE OF EXAM:   2/2/2022 3:34 PM     PROCEDURE:   CT SOFT TISSUE NECK W CONTRAST-     INDICATIONS:   r/o supraglottis     COMPARISON:  No Comparisons Available     TECHNIQUE:   After the intravenous administration of  75 mL of Isovue 370, contiguous  axial images were obtained through the neck. Coronal and sagittal  reconstructions were performed. Automated exposure control and iterative  reconstruction methods were used.      FINDINGS:   No prevertebral soft tissue thickening. No abnormal retropharyngeal  fluid collection. Epiglottis normal in thickness. Thickening of the  aryepiglottic folds and vocal cords with irregular mucosal enhancement  and severe narrowing of the airway. Subglottis patent. The arytenoid  cartilages are not well demonstrated although the right is partially  calcified. Parotid and submandibular glands unremarkable. Status post  left hemithyroidectomy. Diffuse enlargement of the right thyroid gland  with subcentimeter nodules. Muscle and fat planes in the floor the mouth  preserved. Borderline enlarged bilateral level 2 cervical lymph nodes.  Imaged paranasal sinuses and middle ear cavities clear. Visualized lower  brain unremarkable. Degenerative disc disease with prominent dorsal  spurring at C4-C5 and C5-C6, and bulky anterior osteophytes at C3-C4 and  C4-C5. No acute bony abnormality demonstrated.       Impression:      IMPRESSION :   Irregular thickening of the vocal cords and aryepiglottic folds. This  results in severe narrowing of the airway. Infiltrating tumor should be  excluded. Recommend direct visualization. There are borderline enlarged  level 2 internal jugular chain lymph nodes.     Electronically Signed By-Kirill Garrett On:2/2/2022 4:12 PM  This report was finalized on [id number] by  Kirill Garrett, .    CT Chest Pulmonary Embolism [[id number]] Collected: 02/02/22 1555     Updated: 02/02/22 1602    Narrative:         DATE OF  EXAM:  2/2/2022 3:34 PM     PROCEDURE:  CT CHEST PULMONARY EMBOLISM-     INDICATIONS:   PE suspected, high prob     COMPARISON:   No Comparisons Available     TECHNIQUE:  Routine transaxial slices were obtained through chest after  administration of intravenous 75 ml of Isovue 370. Reconstructed coronal  and sagittal images were also obtained. Automated exposure control and  iterative reconstruction methods were used.      FINDINGS:  Pulmonary arteries: Pulmonary arteries are adequately opacified. There  is slight motion distortion in the lung bases more on the left. Within  that limitation, no emboli are demonstrated     Mediastinum: Mildly enlarged right thyroid lobe extending into the  thoracic inlet with no nodule demonstrated. Thoracic aorta normal in  caliber. No mediastinal adenopathy. Cardiomegaly. Trace pericardial  effusion.     Lungs/pleura: No suspicious infiltrate. Scarring versus chronic  atelectasis in the right middle lobe, lingula, and left lower lobe. No  pleural effusion     Upper abdomen: No acute abnormality. Fatty infiltration of the liver     Bones/soft tissues: No acute bony abnormality       Impression:         1. No evidence of pulmonary embolism  2. No acute pulmonary abnormality     Electronically Signed By-Kirill Garrett On:2/2/2022 4:00 PM  This report was finalized on [id number] by  Kirill Garrett, .              Condition on Discharge:  Fair    Vital Signs  Temp:  [97.3 °F (36.3 °C)] 97.3 °F (36.3 °C)  Heart Rate:  [72-74] 72  Resp:  [18-20] 18  BP: (164)/(82) 164/82    Physical Exam:     General Appearance:    Alert, cooperative, in no acute distress   Head:    Normocephalic, without obvious abnormality, atraumatic   Eyes:           Conjunctivae and sclerae normal, no   icterus, no pallor, corneas clear, PERRLA   Throat:   No oral lesions, no thrush, oral mucosa moist   Neck:   No adenopathy, supple, trachea midline, no thyromegaly, no   carotid bruit, no JVD   Lungs:     Clear  to auscultation,respirations regular, even and                  unlabored    Heart:    Regular rhythm and normal rate, normal S1 and S2, no            murmur, no gallop, no rub, no click   Chest Wall:    No abnormalities observed   Abdomen:     Normal bowel sounds, no masses, no organomegaly, soft        non-tender, non-distended, no guarding, no rebound                tenderness   Rectal:     Deferred   Extremities:   Moves all extremities well, no edema, no cyanosis, no             redness   Pulses:   Pulses palpable and equal bilaterally   Skin:   No bleeding, bruising or rash   Lymph nodes:   No palpable adenopathy   Neurologic:   Cranial nerves 2 - 12 grossly intact, sensation intact, DTR       present and equal bilaterally         Discharge Disposition  Home or Self Care    Discharge Medications     Discharge Medications      New Medications      Instructions Start Date   azithromycin 500 MG tablet  Commonly known as: ZITHROMAX   500 mg, Oral, Daily      budesonide-formoterol 160-4.5 MCG/ACT inhaler  Commonly known as: SYMBICORT   2 puffs, Inhalation, 2 Times Daily - RT      dexamethasone 4 MG tablet  Commonly known as: DECADRON   4 mg, Oral, Daily With Breakfast      pantoprazole 40 MG EC tablet  Commonly known as: PROTONIX   40 mg, Oral, Every Early Morning   Start Date: February 6, 2022     zinc sulfate 220 (50 Zn) MG capsule  Commonly known as: ZINCATE   220 mg, Oral, Daily         Continue These Medications      Instructions Start Date   colesevelam 625 MG tablet  Commonly known as: WELCHOL   625 mg, Oral, Daily PRN      Lipitor 20 MG tablet  Generic drug: atorvastatin   20 mg, Oral, Daily      meloxicam 7.5 MG tablet  Commonly known as: MOBIC   7.5 mg, Oral, Daily PRN, In addition to 7.5mg daily LAST KNOWN FILL 2020       Mobic 7.5 MG tablet  Generic drug: meloxicam   7.5 mg, Oral, Daily, LAST KNOWN FILL 2020      Synthroid 137 MCG tablet  Generic drug: levothyroxine   137 mcg, Oral, Every Early  Morning         Stop These Medications    lisinopril-hydrochlorothiazide 20-25 MG per tablet  Commonly known as: PRINZIDE,ZESTORETIC            Discharge Diet:   Diet Instructions    Regular           Activity at Discharge:   Activity Instructions    As tolerated           Follow-up Appointments  No future appointments.      Test Results Pending at Discharge  Pending Labs     Order Current Status    Blood Culture - Blood, Arm, Left Preliminary result    Blood Culture - Blood, Arm, Left Preliminary result    Throat / Upper Respiratory Culture - Swab, Throat Preliminary result           Charles Soliz MD  02/05/22  16:22 EST

## 2022-02-05 NOTE — OUTREACH NOTE
Prep Survey      Responses   [facility] facility patient discharged from? [facility]   Is LACE score < 7 ? No   Emergency Room discharge w/ pulse ox? No   Eligibility Readm Mgmt   Discharge diagnosis Lower respiratory tract infection due to COVID-19 virus   Does the patient have one of the following disease processes/diagnoses(primary or secondary)? COVID-19   Does the patient have Home health ordered? No   Is there a DME ordered? No   Prep survey completed? Yes          Jacquelin Mckeon RN

## 2022-02-05 NOTE — PLAN OF CARE
Goal Outcome Evaluation:  Plan of Care Reviewed With: patient        Progress: no change       Patient is alert and oriented X4, She has appeared to rest well through the night. No complaints of pain noted. She voiced concern about leaving the hospital but not full knowing what caused the throat swelling. Care plan is on going.

## 2022-02-05 NOTE — PROGRESS NOTES
Daily Progress Note        Lower respiratory tract infection due to COVID-19 virus      Assessment    laryngeal swelling with mild stridor  COVID-19 infection without significant finding on CAT scan  No evidence of pulmonary embolism     Recommendations:    Throat swab, pending    Steroids Decadron 6 mg daily  Antibiotics Rocephin transition to azithromycin p.o.  Mucinex twice daily  DC lisinopril for possible late onset angioedema  Lovenox DVT prophylaxis  racemic epinephrine as needed  diuresis as needed     Recommend follow up with ENT outpatient            COVID-19 LAB PANEL     COVID-19 test result  COVID19   Date Value Ref Range Status   02/02/2022 Detected (C) Not Detected - Ref. Range Final       COVID-19 Prognostic lab results  WBC with differential  Results from last 7 days   Lab Units 02/05/22  0358 02/04/22  1037 02/02/22  1338   WBC 10*3/mm3 8.30 7.70 12.70*   PLATELETS 10*3/mm3 197 183 176   NEUTROPHIL % % 72.5 72.8 76.8*   LYMPHOCYTE % % 18.5* 18.5* 11.1*   NEUTROS ABS 10*3/mm3 6.00 5.60 9.80*   LYMPHS ABS 10*3/mm3 1.50 1.40 1.40     Inflammatory markers  Results from last 7 days   Lab Units 02/02/22  1338   PROCALCITONIN ng/mL 0.10   ALK PHOS U/L 89   AST (SGOT) U/L 47*   ALT (SGPT) U/L 62*   TROPONIN T ng/mL <0.010       Poor COVID-19 outcomes associated with:  Neutrophil:Lymphocyte ratio >3.5  Thrombocytopenia, lymphopenia  LFT's >5x upper limit of normal  LDH >400     LOS: 1 day     Subjective         Objective     Vital signs for last 24 hours:  Vitals:    02/04/22 1153 02/04/22 2002 02/04/22 2004 02/04/22 2121   BP: 165/66   164/82   BP Location: Right arm   Right arm   Patient Position: Lying   Lying   Pulse: 75 74 74 73   Resp: 18 18 18 20   Temp: 98.2 °F (36.8 °C)   97.3 °F (36.3 °C)   TempSrc: Oral   Oral   SpO2: 96% 96% 96% 97%   Weight:       Height:           Intake/Output last 3 shifts:  I/O last 3 completed shifts:  In: 960 [P.O.:960]  Out: 1150 [Urine:1150]  Intake/Output this  shift:  No intake/output data recorded.      Radiology  Imaging Results (Last 24 Hours)     ** No results found for the last 24 hours. **          Labs:  Results from last 7 days   Lab Units 02/05/22  0358   WBC 10*3/mm3 8.30   HEMOGLOBIN g/dL 13.3   HEMATOCRIT % 38.8   PLATELETS 10*3/mm3 197     Results from last 7 days   Lab Units 02/05/22  0358 02/04/22  1037 02/02/22  1338   SODIUM mmol/L 140   < > 141   POTASSIUM mmol/L 3.4*   < > 3.3*   CHLORIDE mmol/L 103   < > 98   CO2 mmol/L 25.0   < > 29.0   BUN mg/dL 18   < > 7*   CREATININE mg/dL 0.79   < > 0.85   CALCIUM mg/dL 8.2*   < > 8.7   BILIRUBIN mg/dL  --   --  0.4   ALK PHOS U/L  --   --  89   ALT (SGPT) U/L  --   --  62*   AST (SGOT) U/L  --   --  47*   GLUCOSE mg/dL 149*   < > 124*    < > = values in this interval not displayed.         Results from last 7 days   Lab Units 02/02/22  1338   ALBUMIN g/dL 4.50     Results from last 7 days   Lab Units 02/02/22  1338   TROPONIN T ng/mL <0.010                           Meds:   SCHEDULE  ascorbic acid, 500 mg, Oral, Daily  atorvastatin, 10 mg, Oral, Nightly  azithromycin, 500 mg, Oral, Q24H  budesonide-formoterol, 2 puff, Inhalation, BID - RT  cefTRIAXone, 1 g, Intravenous, Q24H  dexamethasone, 6 mg, Oral, Daily With Breakfast  enoxaparin, 40 mg, Subcutaneous, Daily  guaiFENesin, 600 mg, Oral, Q12H  levothyroxine, 137 mcg, Oral, Q AM  pantoprazole, 40 mg, Oral, Q AM  sodium chloride, 3 mL, Intravenous, Q12H  zinc sulfate, 220 mg, Oral, Daily      Infusions  Pharmacy to Dose enoxaparin (LOVENOX),       PRNs  Pharmacy to Dose enoxaparin (LOVENOX)  •  sodium chloride  •  sodium chloride    Physical Exam:  Physical Exam  Vitals reviewed.   Pulmonary:      Breath sounds: Rhonchi present.   Skin:     General: Skin is warm and dry.   Neurological:      Mental Status: She is alert and oriented to person, place, and time.         ROS  Review of Systems   HENT:        Sore throat       I reviewed the patient's new clinical  results    Electronically signed by WENDY Wallis

## 2022-02-06 ENCOUNTER — READMISSION MANAGEMENT (OUTPATIENT)
Dept: CALL CENTER | Facility: HOSPITAL | Age: 67
End: 2022-02-06

## 2022-02-06 LAB — BACTERIA SPEC AEROBE CULT: NORMAL

## 2022-02-06 NOTE — OUTREACH NOTE
COVID-19 Week 1 Survey      Responses   [facility] patient discharged from? [facility]   Does the patient have one of the following disease processes/diagnoses(primary or secondary)? COVID-19   COVID-19 underlying condition? None   Call Number Call 1   Week 1 Call successful? Yes   Call start time [phone]   Call end time [phone]   Discharge diagnosis Lower respiratory tract infection due to COVID-19 virus, Laryngeal Swelling - possible late onset angioedema      Is patient permission given to speak with other caregiver? No   Meds reviewed with patient/caregiver? Yes   Does the patient have all medications ordered at discharge? Yes   Is the patient taking all medications as directed (includes completed medication regime)? Yes   Does the patient have a primary care provider?  Yes   Comments regarding PCP Follow up with Ariel Rider MD   Does the patient have an appointment with their PCP or specialist within 7 days of discharge? No   Nursing Interventions Educated patient on importance of making appointment,  Advised patient to make appointment   Has the patient kept scheduled appointments due by today? N/A   Has home health visited the patient within 72 hours of discharge? N/A   Psychosocial issues? Yes   Psychosocial comments  currently hospitalized with COVID.    Did the patient receive a copy of their discharge instructions? Yes   Did the patient receive a copy of COVID-19 specific instructions? Yes   Nursing interventions Reviewed instructions with patient   What is the patient's perception of their health status since discharge? Improving   Does the patient have any of the following symptoms? Cough   Nursing Interventions Nurse provided patient education   Pulse Ox monitoring Intermittent   Pulse Ox device source Patient   O2 Sat comments 95% on room air.    O2 Sat: education provided Sat levels,  Monitoring frequency,  When to seek care   O2 Sat education comments Advised patient to return to ER if O2 sats  remain below 92% with rest and deep breathing.    Is the patient/caregiver able to teach back steps to recovery at home? Set small, achievable goals for return to baseline health,  Rest and rebuild strength, gradually increase activity   If the patient is a current smoker, are they able to teach back resources for cessation? Not a smoker   Is the patient/caregiver able to teach back the hierarchy of who to call/visit for symptoms/problems? PCP, Specialist, Home health nurse, Urgent Care, ED, 911 Yes   COVID-19 call completed? Yes   Wrap up additional comments Patient states that she will quarantine thru Wed 2/9/22 completing 10 day quarantine.           Rosalee Ferrari RN

## 2022-02-07 ENCOUNTER — READMISSION MANAGEMENT (OUTPATIENT)
Dept: CALL CENTER | Facility: HOSPITAL | Age: 67
End: 2022-02-07

## 2022-02-07 LAB
BACTERIA SPEC AEROBE CULT: NORMAL
BACTERIA SPEC AEROBE CULT: NORMAL

## 2022-02-07 NOTE — OUTREACH NOTE
"COVID-19 Week 1 Survey      Responses   [facility] patient discharged from? [facility]   Does the patient have one of the following disease processes/diagnoses(primary or secondary)? COVID-19   COVID-19 underlying condition? None   Call Number Call 2   Week 1 Call successful? Yes   Call start time 0940   Call end time 0946   General alerts for this patient  hospitalized with covid-handicapped son who lives in nursing home also being taken to ER currently.   Meds reviewed with patient/caregiver? Yes   Is the patient having any side effects they believe may be caused by any medication additions or changes? No   Does the patient have all medications ordered at discharge? Yes   Is the patient taking all medications as directed (includes completed medication regime)? Yes   Comments regarding appointments States is waiting on return call from ENT regarding appt.   Does the patient have a primary care provider?  Yes   Does the patient have an appointment with their PCP or specialist within 7 days of discharge? No   What is preventing the patient from scheduling follow up appointments within 7 days of discharge? Haven't had time   Nursing Interventions Advised patient to make appointment,  Educated patient on importance of making appointment   Has the patient kept scheduled appointments due by today? N/A   Psychosocial issues? Yes   Psychosocial comments  still hospitalized with COVID-handicapped son who lives in nursing home currently in transit to ER with seizures.   What is the patient's perception of their health status since discharge? Improving   Does the patient have any of the following symptoms? Cough  [Hoarse.]   Nursing Interventions Nurse provided patient education   Pulse Ox monitoring Intermittent   Pulse Ox device source Patient   O2 Sat comments States O2 sats are \"staying up\".   Is the patient/caregiver able to teach back the hierarchy of who to call/visit for symptoms/problems? PCP, " Specialist, Home health nurse, Urgent Care, ED, 911 Yes   COVID-19 call completed? Yes   Wrap up additional comments Brief call-states is slowly improving. States concerned about her  and son. Denies any  needs at this time.          Dari Holbrook RN

## 2022-02-08 ENCOUNTER — READMISSION MANAGEMENT (OUTPATIENT)
Dept: CALL CENTER | Facility: HOSPITAL | Age: 67
End: 2022-02-08

## 2022-02-08 NOTE — OUTREACH NOTE
COVID-19 Week 1 Survey      Responses   [facility] patient discharged from? [facility]   Does the patient have one of the following disease processes/diagnoses(primary or secondary)? COVID-19   COVID-19 underlying condition? None   Call Number Call 3   Week 1 Call successful? Yes   Call start time 1258   Call end time 1304   General alerts for this patient  hospitalized with covid-handicapped son who lives in nursing home also being taken to ER currently.   Discharge diagnosis Lower respiratory tract infection due to COVID-19 virus, Laryngeal Swelling - possible late onset angioedema      Medication alerts for this patient finished abx 2-7-22   Meds reviewed with patient/caregiver? Yes   Is the patient having any side effects they believe may be caused by any medication additions or changes? No   Is the patient taking all medications as directed (includes completed medication regime)? Yes   Does the patient have a primary care provider?  Yes   Comments regarding PCP 2-14-22 with PCP and waiting on call back from ENT    Does the patient have an appointment with their PCP or specialist within 7 days of discharge? Yes   Has the patient kept scheduled appointments due by today? N/A   Psychosocial comments  still hospitalized with COVID, started on HD today 2-8-22,  Her handicapped son who lives in nursing home currently in [facility] in ICU for szrs.   What is the patient's perception of their health status since discharge? Improving   Does the patient have any of the following symptoms? Cough  [productive cough-white in color, hoarse, ]   Nursing Interventions Nurse provided patient education   Pulse Ox monitoring Intermittent   Pulse Ox device source Patient   O2 Sat comments 96% RA   Is the patient/caregiver able to teach back steps to recovery at home? Rest and rebuild strength, gradually increase activity   Is the patient/caregiver able to teach back the hierarchy of who to call/visit for  symptoms/problems? PCP, Specialist, Home health nurse, Urgent Care, ED, 911 Yes   COVID-19 call completed? Yes          Lacy Ham RN

## 2022-02-11 ENCOUNTER — READMISSION MANAGEMENT (OUTPATIENT)
Dept: CALL CENTER | Facility: HOSPITAL | Age: 67
End: 2022-02-11

## 2022-02-11 NOTE — OUTREACH NOTE
COVID-19 Week 2 Survey      Responses   [facility] patient discharged from? [facility]   Does the patient have one of the following disease processes/diagnoses(primary or secondary)? COVID-19   COVID-19 underlying condition? None   Call Number Call 1   COVID-19 Week 2: Call 1 attempt successful? Yes   Call start time 1102   Call end time 1107   General alerts for this patient  hospitalized with covid-handicapped son who lives in nursing home also being taken to ER currently.   Discharge diagnosis Lower respiratory tract infection due to COVID-19 virus, Laryngeal Swelling - possible late onset angioedema      Person spoke with today (if not patient) and relationship Patient   Meds reviewed with patient/caregiver? Yes   Is the patient having any side effects they believe may be caused by any medication additions or changes? No   Does the patient have all medications ordered at discharge? Yes   Is the patient taking all medications as directed (includes completed medication regime)? Yes   Does the patient have a primary care provider?  Yes   Comments regarding PCP 2-14-22 with PCP and waiting on call back from ENT    Does the patient have an appointment with their PCP or specialist within 7 days of discharge? Yes   Has the patient kept scheduled appointments due by today? N/A   Psychosocial issues? Yes   Psychosocial comments  still hospitalized with COVID, started on HD today 2-8-22,  Her handicapped son who lives in nursing home currently in [facility] in ICU for szrs.   What is the patient's perception of their health status since discharge? Improving   Does the patient have any of the following symptoms? None   Nursing Interventions Nurse provided patient education   Pulse Ox monitoring Intermittent   Pulse Ox device source Patient   Is the patient/caregiver able to teach back steps to recovery at home? Rest and rebuild strength, gradually increase activity,  Eat a well-balance diet   COVID-19 call  completed? Yes   Wrap up additional comments Pt states she is doing alright,  pt very upset and in the hospital with her , and shares her  is not doing well, and getting worse.           Aishwarya Shafer RN

## 2022-02-18 ENCOUNTER — READMISSION MANAGEMENT (OUTPATIENT)
Dept: CALL CENTER | Facility: HOSPITAL | Age: 67
End: 2022-02-18

## 2022-02-18 NOTE — OUTREACH NOTE
COVID-19 Week 3 Survey      Responses   [facility] patient discharged from? [facility]   Does the patient have one of the following disease processes/diagnoses(primary or secondary)? COVID-19   COVID-19 underlying condition? None   Call Number Call 1   COVID-19 Week 3: Call 1 attempt successful? Yes   Call start time 1447   Call end time 1450   General alerts for this patient 2/18 update:  passed away this morning- son is being released from [facility] and will be going back to the nursing home today   Discharge diagnosis Lower respiratory tract infection due to COVID-19 virus, Laryngeal Swelling - possible late onset angioedema      Person spoke with today (if not patient) and relationship Patient   Is the patient taking all medications as directed (includes completed medication regime)? Yes   Has the patient kept scheduled appointments due by today? Yes   Has home health visited the patient within 72 hours of discharge? N/A   What is the patient's perception of their health status since discharge? Improving   Does the patient have any of the following symptoms? None   Is the patient/caregiver able to teach back the hierarchy of who to call/visit for symptoms/problems? PCP, Specialist, Home health nurse, Urgent Care, ED, 911 Yes   COVID-19 call completed? Yes   Revoked No further contact(revokes)-requires comment   Is the patient interested in additional calls from an ambulatory ?  NOTE:  applies to high risk patients requiring additional follow-up. No   Graduated/Revoked comments Says she is doing better but her  passed away this morning from his COVID infection, no further calls needed.          Petty Haddad RN

## 2023-05-26 ENCOUNTER — HOSPITAL ENCOUNTER (EMERGENCY)
Facility: HOSPITAL | Age: 68
Discharge: HOME | End: 2023-05-26
Attending: EMERGENCY MEDICINE
Payer: MEDICARE

## 2023-05-26 VITALS
SYSTOLIC BLOOD PRESSURE: 145 MMHG | OXYGEN SATURATION: 97 % | TEMPERATURE: 98.4 F | WEIGHT: 165 LBS | BODY MASS INDEX: 27.49 KG/M2 | HEART RATE: 55 BPM | DIASTOLIC BLOOD PRESSURE: 70 MMHG | HEIGHT: 65 IN | RESPIRATION RATE: 18 BRPM

## 2023-05-26 DIAGNOSIS — I10 ELEVATED BLOOD PRESSURE READING WITH DIAGNOSIS OF HYPERTENSION: Primary | ICD-10-CM

## 2023-05-26 LAB
ALBUMIN SERPL-MCNC: 4.5 G/DL (ref 3.5–5.2)
ALBUMIN/GLOB SERPL: 2 G/DL
ALP SERPL-CCNC: 94 U/L (ref 39–117)
ALT SERPL W P-5'-P-CCNC: 29 U/L (ref 1–33)
ANION GAP SERPL CALCULATED.3IONS-SCNC: 11 MMOL/L (ref 5–15)
AST SERPL-CCNC: 24 U/L (ref 1–32)
BACTERIA UR QL AUTO: ABNORMAL /HPF
BASOPHILS # BLD AUTO: 0 10*3/MM3 (ref 0–0.2)
BASOPHILS NFR BLD AUTO: 0.8 % (ref 0–1.5)
BILIRUB SERPL-MCNC: 0.4 MG/DL (ref 0–1.2)
BILIRUB UR QL STRIP: NEGATIVE
BUN SERPL-MCNC: 15 MG/DL (ref 8–23)
BUN/CREAT SERPL: 19.2 (ref 7–25)
CALCIUM SPEC-SCNC: 8.8 MG/DL (ref 8.6–10.5)
CHLORIDE SERPL-SCNC: 106 MMOL/L (ref 98–107)
CLARITY UR: CLEAR
CO2 SERPL-SCNC: 27 MMOL/L (ref 22–29)
COLOR UR: YELLOW
CREAT SERPL-MCNC: 0.78 MG/DL (ref 0.57–1)
DEPRECATED RDW RBC AUTO: 45.1 FL (ref 37–54)
EGFRCR SERPLBLD CKD-EPI 2021: 83.9 ML/MIN/1.73
EOSINOPHIL # BLD AUTO: 0 10*3/MM3 (ref 0–0.4)
EOSINOPHIL NFR BLD AUTO: 0.1 % (ref 0.3–6.2)
ERYTHROCYTE [DISTWIDTH] IN BLOOD BY AUTOMATED COUNT: 14.4 % (ref 12.3–15.4)
GLOBULIN UR ELPH-MCNC: 2.3 GM/DL
GLUCOSE SERPL-MCNC: 180 MG/DL (ref 65–99)
GLUCOSE UR STRIP-MCNC: NEGATIVE MG/DL
HCT VFR BLD AUTO: 44.9 % (ref 34–46.6)
HGB BLD-MCNC: 15 G/DL (ref 12–15.9)
HGB UR QL STRIP.AUTO: NEGATIVE
HYALINE CASTS UR QL AUTO: ABNORMAL /LPF
KETONES UR QL STRIP: NEGATIVE
LEUKOCYTE ESTERASE UR QL STRIP.AUTO: NEGATIVE
LYMPHOCYTES # BLD AUTO: 0.7 10*3/MM3 (ref 0.7–3.1)
LYMPHOCYTES NFR BLD AUTO: 12 % (ref 19.6–45.3)
MCH RBC QN AUTO: 30 PG (ref 26.6–33)
MCHC RBC AUTO-ENTMCNC: 33.4 G/DL (ref 31.5–35.7)
MCV RBC AUTO: 89.8 FL (ref 79–97)
MONOCYTES # BLD AUTO: 0.1 10*3/MM3 (ref 0.1–0.9)
MONOCYTES NFR BLD AUTO: 1.1 % (ref 5–12)
NEUTROPHILS NFR BLD AUTO: 5.4 10*3/MM3 (ref 1.7–7)
NEUTROPHILS NFR BLD AUTO: 86 % (ref 42.7–76)
NITRITE UR QL STRIP: NEGATIVE
NRBC BLD AUTO-RTO: 0 /100 WBC (ref 0–0.2)
PH UR STRIP.AUTO: 7.5 [PH] (ref 5–8)
PLATELET # BLD AUTO: 213 10*3/MM3 (ref 140–450)
PMV BLD AUTO: 9.4 FL (ref 6–12)
POTASSIUM SERPL-SCNC: 3.5 MMOL/L (ref 3.5–5.2)
PROT SERPL-MCNC: 6.8 G/DL (ref 6–8.5)
PROT UR QL STRIP: ABNORMAL
QT INTERVAL: 472 MS
RBC # BLD AUTO: 5 10*6/MM3 (ref 3.77–5.28)
RBC # UR STRIP: ABNORMAL /HPF
REF LAB TEST METHOD: ABNORMAL
SODIUM SERPL-SCNC: 144 MMOL/L (ref 136–145)
SP GR UR STRIP: 1.01 (ref 1–1.03)
SQUAMOUS #/AREA URNS HPF: ABNORMAL /HPF
TROPONIN T SERPL HS-MCNC: <6 NG/L
UROBILINOGEN UR QL STRIP: ABNORMAL
WBC # UR STRIP: ABNORMAL /HPF
WBC NRBC COR # BLD: 6.3 10*3/MM3 (ref 3.4–10.8)

## 2023-05-26 PROCEDURE — 85025 COMPLETE CBC W/AUTO DIFF WBC: CPT | Performed by: NURSE PRACTITIONER

## 2023-05-26 PROCEDURE — 99284 EMERGENCY DEPT VISIT MOD MDM: CPT

## 2023-05-26 PROCEDURE — 80053 COMPREHEN METABOLIC PANEL: CPT | Performed by: NURSE PRACTITIONER

## 2023-05-26 PROCEDURE — 84484 ASSAY OF TROPONIN QUANT: CPT | Performed by: NURSE PRACTITIONER

## 2023-05-26 PROCEDURE — 93005 ELECTROCARDIOGRAM TRACING: CPT | Performed by: NURSE PRACTITIONER

## 2023-05-26 PROCEDURE — 81001 URINALYSIS AUTO W/SCOPE: CPT | Performed by: NURSE PRACTITIONER

## 2023-05-26 RX ORDER — CLONIDINE HYDROCHLORIDE 0.1 MG/1
0.1 TABLET ORAL ONCE
Status: COMPLETED | OUTPATIENT
Start: 2023-05-26 | End: 2023-05-26

## 2023-05-26 RX ORDER — IBUPROFEN 600 MG/1
600 TABLET ORAL ONCE
Status: COMPLETED | OUTPATIENT
Start: 2023-05-26 | End: 2023-05-26

## 2023-05-26 RX ORDER — SODIUM CHLORIDE 0.9 % (FLUSH) 0.9 %
10 SYRINGE (ML) INJECTION AS NEEDED
Status: DISCONTINUED | OUTPATIENT
Start: 2023-05-26 | End: 2023-05-26 | Stop reason: HOSPADM

## 2023-05-26 RX ORDER — HYDRALAZINE HYDROCHLORIDE 25 MG/1
25 TABLET, FILM COATED ORAL ONCE
Status: COMPLETED | OUTPATIENT
Start: 2023-05-26 | End: 2023-05-26

## 2023-05-26 RX ORDER — HYDRALAZINE HYDROCHLORIDE 25 MG/1
25 TABLET, FILM COATED ORAL 3 TIMES DAILY
Qty: 15 TABLET | Refills: 0 | Status: SHIPPED | OUTPATIENT
Start: 2023-05-26

## 2023-05-26 RX ADMIN — IBUPROFEN 600 MG: 600 TABLET, FILM COATED ORAL at 18:24

## 2023-05-26 RX ADMIN — CLONIDINE HYDROCHLORIDE 0.1 MG: 0.1 TABLET ORAL at 15:18

## 2023-05-26 RX ADMIN — HYDRALAZINE HYDROCHLORIDE 25 MG: 25 TABLET, FILM COATED ORAL at 16:50

## 2023-05-26 NOTE — ED PROVIDER NOTES
Subjective   History of Present Illness  Patient is a 67-year-old white female with history of hypertension and high cholesterol who presents today with reports of elevated blood pressure.  She states she went to her pain clinic to have an epidural injection in her neck today.  She states prior to the injection they checked her blood pressure and it was noted to be elevated to 200s over 90s.  She states they waited a little while and rechecked it and it did come down some and they proceeded with the injection.  She states afterwards it was again elevated so she proceeded to the urgent care center.  She states that there her blood pressure was elevated and she was treated with a clonidine.  She states her blood pressure improved and she was discharged.  She states [facility] to [facility] pressure cuff and while testing the amount, again her blood pressure was noted to be elevated.  She presents to the ED for further evaluation and management.  She does complain of a generalized headache, mild.  No dizziness or visual changes.  No unilateral weakness or deficit.  She denies chest pain or shortness of breath.  She states she does take Toprol and losartan for her blood pressure and states that she did take both of these this morning as usual.  She reports no recent changes in her medications.  No recent increase stress at home.  No recent intake of over-the-counter cough or cold preps or decongestants.        Review of Systems   Constitutional: Negative for fever.   Eyes: Negative for visual disturbance.   Respiratory: Negative for shortness of breath.    Cardiovascular: Negative for chest pain.   Gastrointestinal: Negative for abdominal pain, nausea and vomiting.   Neurological: Positive for headaches. Negative for dizziness, syncope, facial asymmetry, speech difficulty, weakness, light-headedness and numbness.       Past Medical History:   Diagnosis Date   • Arthritis     Rheumatoid   • Disease of  thyroid gland    • Hyperlipidemia    • Hypertension    • PONV (postoperative nausea and vomiting)        No Known Allergies    Past Surgical History:   Procedure Laterality Date   • ABDOMINAL SURGERY     • CHOLECYSTECTOMY     • FINGER SURGERY     • HYSTERECTOMY     • INCISION AND DRAINAGE OF WOUND Right 4/6/2021    Procedure: Incision and drainage of abscess of right face/cheek;  Surgeon: Ariel Hernandes DO;  Location: [facility] MAIN OR;  Service: General;  Laterality: Right;   • OOPHORECTOMY     • THYROID SURGERY     • TUBAL ABDOMINAL LIGATION      1 tube removed       Family History   Problem Relation Age of Onset   • Diabetes Mother    • COPD Father        Social History     Socioeconomic History   • Marital status:    Tobacco Use   • Smoking status: Never   • Smokeless tobacco: Never   Vaping Use   • Vaping Use: Never used   Substance and Sexual Activity   • Alcohol use: Not Currently     Comment: rarely   • Drug use: Never   • Sexual activity: Defer           Objective   Physical Exam  Vital signs and triage nurse note reviewed.  Constitutional: Awake, alert; well-developed and well-nourished. No acute distress is noted.  HEENT: Normocephalic, atraumatic; pupils are PERRL with intact EOM; oropharynx is pink and moist without exudate or erythema.  No drooling or pooling of oral secretions.  Neck: Supple, full range of motion without pain; no cervical lymphadenopathy. Normal phonation.  Cardiovascular: Regular rate and rhythm, normal S1-S2.  No murmur noted.  Pulmonary: Respiratory effort regular nonlabored, breath sounds clear to auscultation all fields.  Abdomen: Soft, nontender, nondistended with normoactive bowel sounds; no rebound or guarding.  Musculoskeletal: Independent range of motion of all extremities with no palpable tenderness or edema.  Neuro: Alert oriented x3, speech is clear and appropriate, GCS 15.    Skin: Flesh tone, warm, dry, intact; no erythematous or petechial rash or  lesion.      Procedures           ED Course  ED Course as of 05/26/23 1745   Fri May 26, 2023   1550 Spoke with LILIAN King on-call for [employer].  She recommends starting patient on hydralazine 25mg TID and follow up in the office.    Patient given hydralazine 25mg oral dose in the ED.  [MD]      ED Course User Index  [MD] Geovanna Klein APRN      Labs Reviewed   COMPREHENSIVE METABOLIC PANEL - Abnormal; Notable for the following components:       Result Value    Glucose 180 (*)     All other components within normal limits    Narrative:     GFR Normal >60  Chronic Kidney Disease <60  Kidney Failure <15     URINALYSIS W/ MICROSCOPIC IF INDICATED (NO CULTURE) - Abnormal; Notable for the following components:    Protein, UA 30 mg/dL (1+) (*)     All other components within normal limits   CBC WITH AUTO DIFFERENTIAL - Abnormal; Notable for the following components:    Neutrophil % 86.0 (*)     Lymphocyte % 12.0 (*)     Monocyte % 1.1 (*)     Eosinophil % 0.1 (*)     All other components within normal limits   URINALYSIS, MICROSCOPIC ONLY - Abnormal; Notable for the following components:    RBC, UA 0-2 (*)     WBC, UA 0-2 (*)     All other components within normal limits   SINGLE HSTROPONIN T - Normal    Narrative:     High Sensitive Troponin T Reference Range:  <10.0 ng/L- Negative Female for AMI  <15.0 ng/L- Negative Male for AMI  >=10 - Abnormal Female indicating possible myocardial injury.  >=15 - Abnormal Male indicating possible myocardial injury.   Clinicians would have to utilize clinical acumen, EKG, Troponin, and serial changes to determine if it is an Acute Myocardial Infarction or myocardial injury due to an underlying chronic condition.        CBC AND DIFFERENTIAL    Narrative:     The following orders were created for panel order CBC & Differential.  Procedure                               Abnormality         Status                     ---------                               -----------         ------                      CBC Auto Differential[[id number]]        Abnormal            Final result                 Please view results for these tests on the individual orders.     No radiology results for the last day  Medications   sodium chloride 0.9 % flush 10 mL (has no administration in time range)   cloNIDine (CATAPRES) tablet 0.1 mg (0.1 mg Oral Given 5/26/23 1518)   hydrALAZINE (APRESOLINE) tablet 25 mg (25 mg Oral Given 5/26/23 1650)                                          Medical Decision Making  Patient presents today with complaints of elevated blood pressure readings today as well as a mild generalized headache despite taking her normal blood pressure medications this morning.    Patient had above exam evaluation.  She is placed on continuous cardiac monitor.  IV was established.  Labs and EKG were obtained.  Her blood pressure on my exam was 208/88.  She was given clonidine 0.1 mg.  Her blood pressure did improve to 154/61.    Work-up: My EKG interpretation shows a sinus bradycardia with ventricular rate of 58, incomplete right bundle branch block, left anterior fascicular block, this was corroborated by Dr. Florez.  CBC is grossly unremarkable.  Metabolic panel significant for glucose 180 otherwise unremarkable.  Troponin less than 6, urinalysis shows no evidence of infection.    Patient was discussed with Christine nurse practitioner on-call for [facility].  She suggests getting patient hydralazine and following up in the office next week.  Patient was given his dose of hydralazine here in the ED and on reexamination patient's blood pressure is 148/74, heart rate 57.  She remains awake alert and without focal neurologic deficits.  She has no complaints at this time.    Findings were discussed with patient.  She will be discharged home with prescription for hydralazine instructed to follow-up with primary care provider next week.  She was given warning signs for prompt return to the ED and she voiced  understanding.    Elevated blood pressure reading with diagnosis of hypertension: acute illness or injury  Amount and/or Complexity of Data Reviewed  Labs: ordered.  ECG/medicine tests: ordered.      Risk  Prescription drug management.          Final diagnoses:   Elevated blood pressure reading with diagnosis of hypertension       ED Disposition  ED Disposition     ED Disposition   Discharge    Condition   Stable    Comment   --             Ariel Rider MD  [address] [facility] [place] [address] [phone]    Schedule an appointment as soon as possible for a visit            Medication List      New Prescriptions    hydrALAZINE 25 MG tablet  Commonly known as: APRESOLINE  Take 1 tablet by mouth 3 (Three) Times a Day.           Where to Get Your Medications      These medications were sent to [facility] - [place], [place] - [address] [place] [address] - [phone]  - [phone] FX  [address] [place] [address] [place] [address]    Phone: [phone]   · hydrALAZINE 25 MG tablet          Geovanna Klein APRN  05/26/23 9869

## 2023-05-26 NOTE — DISCHARGE INSTRUCTIONS
Continue current home medications.  Take hydralazine as instructed.  Continue to monitor and record your blood pressures and follow-up with your primary care provider next week for recheck and further management.  Return to the ED for new or worsening symptoms.

## 2023-05-26 NOTE — ED NOTES
Patient reports consistent hypertension today and a headache.  Saw doctor who administered Clonidine 1 mg which helped but within 30 min B/P returned.  Called PCP who advised her to go to ER.  Currently patient has headache and denies any other pain  or discomfort at this time.

## 2023-06-12 ENCOUNTER — OFFICE VISIT (OUTPATIENT)
Dept: CARDIOLOGY | Facility: CLINIC | Age: 68
End: 2023-06-12
Payer: MEDICARE

## 2023-06-12 ENCOUNTER — PATIENT ROUNDING (BHMG ONLY) (OUTPATIENT)
Dept: CARDIOLOGY | Facility: CLINIC | Age: 68
End: 2023-06-12

## 2023-06-12 VITALS
WEIGHT: 175 LBS | BODY MASS INDEX: 29.16 KG/M2 | HEIGHT: 65 IN | SYSTOLIC BLOOD PRESSURE: 136 MMHG | OXYGEN SATURATION: 98 % | HEART RATE: 65 BPM | DIASTOLIC BLOOD PRESSURE: 89 MMHG

## 2023-06-12 DIAGNOSIS — R94.31 ABNORMAL EKG: ICD-10-CM

## 2023-06-12 DIAGNOSIS — R06.02 SHORTNESS OF BREATH: Primary | ICD-10-CM

## 2023-06-12 DIAGNOSIS — I10 PRIMARY HYPERTENSION: ICD-10-CM

## 2023-06-12 DIAGNOSIS — E78.00 PURE HYPERCHOLESTEROLEMIA: ICD-10-CM

## 2023-06-12 PROCEDURE — 3079F DIAST BP 80-89 MM HG: CPT | Performed by: INTERNAL MEDICINE

## 2023-06-12 PROCEDURE — 1159F MED LIST DOCD IN RCRD: CPT | Performed by: INTERNAL MEDICINE

## 2023-06-12 PROCEDURE — 3075F SYST BP GE 130 - 139MM HG: CPT | Performed by: INTERNAL MEDICINE

## 2023-06-12 PROCEDURE — 1160F RVW MEDS BY RX/DR IN RCRD: CPT | Performed by: INTERNAL MEDICINE

## 2023-06-12 PROCEDURE — 99204 OFFICE O/P NEW MOD 45 MIN: CPT | Performed by: INTERNAL MEDICINE

## 2023-06-12 RX ORDER — HYDROCHLOROTHIAZIDE 25 MG/1
25 TABLET ORAL 3 TIMES DAILY
COMMUNITY

## 2023-06-12 RX ORDER — CLONIDINE HYDROCHLORIDE 0.1 MG/1
0.1 TABLET ORAL 2 TIMES DAILY
COMMUNITY
End: 2023-06-19

## 2023-06-12 NOTE — PROGRESS NOTES
"    Subjective:     Encounter Date:06/12/2023      Patient ID: Joi Cheng is a 67 y.o. female.    Chief Complaint:  History of Present Illness 67-year-old white female with history of hypertension hyperlipidemia presents to my office for a new consultation.  Patient has been having problems with her blood pressure recently and she is been placed on medications.  She complains of shortness of breath but no chest pains.  No palpitation but has some dizziness.  No PND orthopnea.  No syncope or swelling of the feet.  She is taking her medicines regularly she does not believe she has sleep apnea.  She never smoked.  /89   Pulse 65   Ht 165.1 cm (65\")   Wt 79.4 kg (175 lb)   SpO2 98%   BMI 29.12 kg/m²     The following portions of the patient's history were reviewed and updated as appropriate: allergies, current medications, past family history, past medical history, past social history, past surgical history, and problem list.  Past Medical History:   Diagnosis Date    Abnormal ECG 6/2/2023    Arthritis     Rheumatoid    Disease of thyroid gland     Hyperlipidemia     Hypertension     PONV (postoperative nausea and vomiting)      Past Surgical History:   Procedure Laterality Date    ABDOMINAL SURGERY      CHOLECYSTECTOMY      FINGER SURGERY      HYSTERECTOMY      INCISION AND DRAINAGE OF WOUND Right 04/06/2021    Procedure: Incision and drainage of abscess of right face/cheek;  Surgeon: Ariel Hernandes DO;  Location: [facility];  Service: General;  Laterality: Right;    OOPHORECTOMY      THYROID SURGERY      TUBAL ABDOMINAL LIGATION      1 tube removed     Social History     Socioeconomic History    Marital status:    Tobacco Use    Smoking status: Never    Smokeless tobacco: Never   Vaping Use    Vaping Use: Never used   Substance and Sexual Activity    Alcohol use: Not Currently     Comment: Rarely do I drink    Drug use: Never    Sexual activity: Not Currently     Partners: Male     " Birth control/protection: Hysterectomy     Family History   Problem Relation Age of Onset    Diabetes Mother     Heart attack Mother     Heart disease Mother     Heart failure Mother     COPD Father        Current Outpatient Medications:     atorvastatin (LIPITOR) 20 MG tablet, Take 1 tablet by mouth Daily., Disp: , Rfl:     cloNIDine (CATAPRES) 0.1 MG tablet, Take 1 tablet by mouth 2 (Two) Times a Day., Disp: , Rfl:     hydrALAZINE (APRESOLINE) 25 MG tablet, Take 1 tablet by mouth 3 (Three) Times a Day., Disp: 15 tablet, Rfl: 0    hydroCHLOROthiazide (HYDRODIURIL) 25 MG tablet, Take 1 tablet by mouth 3 (Three) Times a Day., Disp: , Rfl:     levothyroxine (SYNTHROID, LEVOTHROID) 137 MCG tablet, Take 1 tablet by mouth Every Morning., Disp: , Rfl:     losartan (COZAAR) 50 MG tablet, Take 1 tablet by mouth Daily., Disp: , Rfl:     metoprolol succinate XL (TOPROL-XL) 25 MG 24 hr tablet, Take 1 tablet by mouth Daily., Disp: , Rfl:   Allergies   Allergen Reactions    Lisinopril Anaphylaxis       Review of Systems   Constitutional: Positive for malaise/fatigue. Negative for fever.   HENT:  Negative for ear pain and nosebleeds.    Eyes:  Negative for blurred vision and double vision.   Cardiovascular:  Negative for chest pain, dyspnea on exertion and palpitations.   Respiratory:  Positive for shortness of breath. Negative for cough.    Skin:  Negative for rash.   Musculoskeletal:  Negative for joint pain.   Gastrointestinal:  Negative for abdominal pain, nausea and vomiting.   Neurological:  Positive for headaches and light-headedness. Negative for focal weakness.   Psychiatric/Behavioral:  Negative for depression. The patient is not nervous/anxious.    All other systems reviewed and are negative.           Objective:     Constitutional:       Appearance: Well-developed.   Eyes:      General: No scleral icterus.     Conjunctiva/sclera: Conjunctivae normal.      Pupils: Pupils are equal, round, and reactive to light.   HENT:       Head: Normocephalic and atraumatic.   Neck:      Vascular: No carotid bruit or JVD.   Pulmonary:      Effort: Pulmonary effort is normal.      Breath sounds: Normal breath sounds. No wheezing. No rales.   Cardiovascular:      Normal rate. Regular rhythm.   Pulses:     Intact distal pulses.   Abdominal:      General: Bowel sounds are normal.      Palpations: Abdomen is soft.   Musculoskeletal: Normal range of motion.      Cervical back: Normal range of motion and neck supple. Skin:     General: Skin is warm and dry.      Findings: No rash.   Neurological:      Mental Status: Alert.      Comments: No focal deficits       Procedures    Lab Review:       Assessment:          Diagnosis Plan   1. Shortness of breath        2. Primary hypertension        3. Pure hypercholesterolemia        4. Abnormal EKG               Plan:       Patient admitted the shortness of breath and dizziness and has high blood pressures at home  I have adjusted her medications and directions for her blood pressure  Patient has an abnormal EKG with ST-T abnormalities and hence I will perform an echocardiogram for LV function and valvular abnormalities  Patient will also have a stress Myoview to rule out ischemia  Patient is currently on statins for hyperlipidemia  Further treatment based on echo and stress test results

## 2023-06-12 NOTE — PROGRESS NOTES
A My-Chart message has been sent to the patient for [place] [facility]

## 2023-06-19 DIAGNOSIS — I10 ESSENTIAL HYPERTENSION: Primary | Chronic | ICD-10-CM

## 2023-06-19 RX ORDER — HYDRALAZINE HYDROCHLORIDE 100 MG/1
100 TABLET, FILM COATED ORAL 2 TIMES DAILY
Qty: 180 TABLET | Refills: 1 | Status: SHIPPED | OUTPATIENT
Start: 2023-06-19

## 2023-06-19 RX ORDER — LOSARTAN POTASSIUM 50 MG/1
100 TABLET ORAL DAILY
Qty: 90 TABLET | Refills: 0
Start: 2023-06-19

## 2024-01-08 ENCOUNTER — HOSPITAL ENCOUNTER (OUTPATIENT)
Dept: CARDIOLOGY | Facility: HOSPITAL | Age: 69
Discharge: HOME | End: 2024-01-08
Payer: MEDICARE

## 2024-01-08 ENCOUNTER — TRANSCRIBE ORDERS (OUTPATIENT)
Dept: ADMINISTRATIVE | Facility: HOSPITAL | Age: 69
End: 2024-01-08
Payer: MEDICARE

## 2024-01-08 ENCOUNTER — LAB (OUTPATIENT)
Dept: LAB | Facility: HOSPITAL | Age: 69
End: 2024-01-08
Payer: MEDICARE

## 2024-01-08 DIAGNOSIS — Z01.818 PRE-OP TESTING: ICD-10-CM

## 2024-01-08 DIAGNOSIS — Z01.818 PRE-OP TESTING: Primary | ICD-10-CM

## 2024-01-08 LAB
ANION GAP SERPL CALCULATED.3IONS-SCNC: 13 MMOL/L (ref 5–15)
BASOPHILS # BLD AUTO: 0.05 10*3/MM3 (ref 0–0.2)
BASOPHILS NFR BLD AUTO: 1 % (ref 0–1.5)
BUN SERPL-MCNC: 21 MG/DL (ref 8–23)
BUN/CREAT SERPL: 17.4 (ref 7–25)
CALCIUM SPEC-SCNC: 9.1 MG/DL (ref 8.6–10.5)
CHLORIDE SERPL-SCNC: 99 MMOL/L (ref 98–107)
CO2 SERPL-SCNC: 30 MMOL/L (ref 22–29)
CREAT SERPL-MCNC: 1.21 MG/DL (ref 0.57–1)
DEPRECATED RDW RBC AUTO: 41.6 FL (ref 37–54)
EGFRCR SERPLBLD CKD-EPI 2021: 49.2 ML/MIN/1.73
EOSINOPHIL # BLD AUTO: 0.06 10*3/MM3 (ref 0–0.4)
EOSINOPHIL NFR BLD AUTO: 1.2 % (ref 0.3–6.2)
ERYTHROCYTE [DISTWIDTH] IN BLOOD BY AUTOMATED COUNT: 13.1 % (ref 12.3–15.4)
GLUCOSE SERPL-MCNC: 157 MG/DL (ref 65–99)
HCT VFR BLD AUTO: 39.9 % (ref 34–46.6)
HGB BLD-MCNC: 13.7 G/DL (ref 12–15.9)
IMM GRANULOCYTES # BLD AUTO: 0.01 10*3/MM3 (ref 0–0.05)
IMM GRANULOCYTES NFR BLD AUTO: 0.2 % (ref 0–0.5)
LYMPHOCYTES # BLD AUTO: 0.9 10*3/MM3 (ref 0.7–3.1)
LYMPHOCYTES NFR BLD AUTO: 17.3 % (ref 19.6–45.3)
MCH RBC QN AUTO: 30 PG (ref 26.6–33)
MCHC RBC AUTO-ENTMCNC: 34.3 G/DL (ref 31.5–35.7)
MCV RBC AUTO: 87.5 FL (ref 79–97)
MONOCYTES # BLD AUTO: 0.63 10*3/MM3 (ref 0.1–0.9)
MONOCYTES NFR BLD AUTO: 12.1 % (ref 5–12)
NEUTROPHILS NFR BLD AUTO: 3.54 10*3/MM3 (ref 1.7–7)
NEUTROPHILS NFR BLD AUTO: 68.2 % (ref 42.7–76)
NRBC BLD AUTO-RTO: 0 /100 WBC (ref 0–0.2)
PLATELET # BLD AUTO: 176 10*3/MM3 (ref 140–450)
PMV BLD AUTO: 12.1 FL (ref 6–12)
POTASSIUM SERPL-SCNC: 2.9 MMOL/L (ref 3.5–5.2)
QT INTERVAL: 428 MS
QTC INTERVAL: 411 MS
RBC # BLD AUTO: 4.56 10*6/MM3 (ref 3.77–5.28)
SODIUM SERPL-SCNC: 142 MMOL/L (ref 136–145)
WBC NRBC COR # BLD AUTO: 5.19 10*3/MM3 (ref 3.4–10.8)

## 2024-01-08 PROCEDURE — 85025 COMPLETE CBC W/AUTO DIFF WBC: CPT

## 2024-01-08 PROCEDURE — 80048 BASIC METABOLIC PNL TOTAL CA: CPT

## 2024-01-08 PROCEDURE — 36415 COLL VENOUS BLD VENIPUNCTURE: CPT

## 2024-01-08 PROCEDURE — 93005 ELECTROCARDIOGRAM TRACING: CPT | Performed by: ORTHOPAEDIC SURGERY

## 2024-01-10 ENCOUNTER — TELEPHONE (OUTPATIENT)
Dept: CARDIOLOGY | Facility: CLINIC | Age: 69
End: 2024-01-10
Payer: MEDICARE

## 2024-01-10 NOTE — TELEPHONE ENCOUNTER
FACILITY: [facility]   [place]   PHONE:   FAX: [phone] & [phone]   PROCEDURE: PCF C4-C6j and ACDF C5-6  SCHEDULED: 01/24/2024  MEDS TO HOLD: no blood thinners

## 2024-02-21 ENCOUNTER — HOSPITAL ENCOUNTER (INPATIENT)
Facility: HOSPITAL | Age: 69
LOS: 1 days | Discharge: HOME | DRG: 312 | End: 2024-02-23
Attending: EMERGENCY MEDICINE | Admitting: INTERNAL MEDICINE
Payer: MEDICARE

## 2024-02-21 ENCOUNTER — APPOINTMENT (OUTPATIENT)
Dept: CARDIOLOGY | Facility: HOSPITAL | Age: 69
DRG: 312 | End: 2024-02-21
Payer: MEDICARE

## 2024-02-21 ENCOUNTER — APPOINTMENT (OUTPATIENT)
Dept: CT IMAGING | Facility: HOSPITAL | Age: 69
DRG: 312 | End: 2024-02-21
Payer: MEDICARE

## 2024-02-21 ENCOUNTER — APPOINTMENT (OUTPATIENT)
Dept: GENERAL RADIOLOGY | Facility: HOSPITAL | Age: 69
DRG: 312 | End: 2024-02-21
Payer: MEDICARE

## 2024-02-21 DIAGNOSIS — R55 NEAR SYNCOPE: Primary | ICD-10-CM

## 2024-02-21 LAB
ALBUMIN SERPL-MCNC: 3.6 G/DL (ref 3.5–5.2)
ALBUMIN SERPL-MCNC: 4 G/DL (ref 3.5–5.2)
ALBUMIN/GLOB SERPL: 1.8 G/DL
ALP SERPL-CCNC: 78 U/L (ref 39–117)
ALP SERPL-CCNC: 90 U/L (ref 39–117)
ALT SERPL W P-5'-P-CCNC: 332 U/L (ref 1–33)
ALT SERPL W P-5'-P-CCNC: 377 U/L (ref 1–33)
ANION GAP SERPL CALCULATED.3IONS-SCNC: 10 MMOL/L (ref 5–15)
AST SERPL-CCNC: 228 U/L (ref 1–32)
AST SERPL-CCNC: 252 U/L (ref 1–32)
BASOPHILS # BLD AUTO: 0 10*3/MM3 (ref 0–0.2)
BASOPHILS NFR BLD AUTO: 0.8 % (ref 0–1.5)
BH CV XLRA MEAS LEFT DIST CCA EDV: -20.4 CM/SEC
BH CV XLRA MEAS LEFT DIST CCA PSV: -75.3 CM/SEC
BH CV XLRA MEAS LEFT DIST ICA EDV: -22.3 CM/SEC
BH CV XLRA MEAS LEFT DIST ICA PSV: -77 CM/SEC
BH CV XLRA MEAS LEFT ICA/CCA RATIO: -0.75
BH CV XLRA MEAS LEFT PROX CCA EDV: 15.7 CM/SEC
BH CV XLRA MEAS LEFT PROX CCA PSV: 103 CM/SEC
BH CV XLRA MEAS LEFT PROX ECA PSV: -103 CM/SEC
BH CV XLRA MEAS LEFT PROX ICA EDV: -21.4 CM/SEC
BH CV XLRA MEAS LEFT PROX ICA PSV: -68.8 CM/SEC
BH CV XLRA MEAS LEFT PROX SCLA PSV: 162 CM/SEC
BH CV XLRA MEAS LEFT VERTEBRAL A EDV: -17.2 CM/SEC
BH CV XLRA MEAS LEFT VERTEBRAL A PSV: -47.8 CM/SEC
BH CV XLRA MEAS RIGHT DIST CCA EDV: -20.5 CM/SEC
BH CV XLRA MEAS RIGHT DIST CCA PSV: -77 CM/SEC
BH CV XLRA MEAS RIGHT DIST ICA EDV: -14 CM/SEC
BH CV XLRA MEAS RIGHT DIST ICA PSV: -53.9 CM/SEC
BH CV XLRA MEAS RIGHT ICA/CCA RATIO: -0.85
BH CV XLRA MEAS RIGHT PROX CCA EDV: 16.2 CM/SEC
BH CV XLRA MEAS RIGHT PROX CCA PSV: 87 CM/SEC
BH CV XLRA MEAS RIGHT PROX ECA PSV: -139 CM/SEC
BH CV XLRA MEAS RIGHT PROX ICA EDV: -19.6 CM/SEC
BH CV XLRA MEAS RIGHT PROX ICA PSV: -74.3 CM/SEC
BH CV XLRA MEAS RIGHT PROX SCLA PSV: 173 CM/SEC
BH CV XLRA MEAS RIGHT VERTEBRAL A EDV: -14.7 CM/SEC
BH CV XLRA MEAS RIGHT VERTEBRAL A PSV: -49.7 CM/SEC
BILIRUB CONJ SERPL-MCNC: 0.4 MG/DL (ref 0–0.3)
BILIRUB INDIRECT SERPL-MCNC: 0.5 MG/DL
BILIRUB SERPL-MCNC: 0.9 MG/DL (ref 0–1.2)
BILIRUB SERPL-MCNC: 1 MG/DL (ref 0–1.2)
BUN SERPL-MCNC: 24 MG/DL (ref 8–23)
BUN/CREAT SERPL: 27.6 (ref 7–25)
CALCIUM SPEC-SCNC: 9.4 MG/DL (ref 8.6–10.5)
CHLORIDE SERPL-SCNC: 104 MMOL/L (ref 98–107)
CO2 SERPL-SCNC: 28 MMOL/L (ref 22–29)
COHGB MFR BLD: 1.4 % (ref 0–3)
CREAT SERPL-MCNC: 0.87 MG/DL (ref 0.57–1)
DEPRECATED RDW RBC AUTO: 45.1 FL (ref 37–54)
EGFRCR SERPLBLD CKD-EPI 2021: 73.1 ML/MIN/1.73
EOSINOPHIL # BLD AUTO: 0.1 10*3/MM3 (ref 0–0.4)
EOSINOPHIL NFR BLD AUTO: 1 % (ref 0.3–6.2)
ERYTHROCYTE [DISTWIDTH] IN BLOOD BY AUTOMATED COUNT: 14.3 % (ref 12.3–15.4)
GEN 5 2HR TROPONIN T REFLEX: 13 NG/L
GLOBULIN UR ELPH-MCNC: 2.2 GM/DL
GLUCOSE SERPL-MCNC: 144 MG/DL (ref 65–99)
HCT VFR BLD AUTO: 43.9 % (ref 34–46.6)
HGB BLD-MCNC: 14.6 G/DL (ref 12–15.9)
LYMPHOCYTES # BLD AUTO: 1.1 10*3/MM3 (ref 0.7–3.1)
LYMPHOCYTES NFR BLD AUTO: 19.4 % (ref 19.6–45.3)
MCH RBC QN AUTO: 30.3 PG (ref 26.6–33)
MCHC RBC AUTO-ENTMCNC: 33.2 G/DL (ref 31.5–35.7)
MCV RBC AUTO: 91.1 FL (ref 79–97)
MONOCYTES # BLD AUTO: 0.7 10*3/MM3 (ref 0.1–0.9)
MONOCYTES NFR BLD AUTO: 11.6 % (ref 5–12)
NEUTROPHILS NFR BLD AUTO: 3.9 10*3/MM3 (ref 1.7–7)
NEUTROPHILS NFR BLD AUTO: 67.2 % (ref 42.7–76)
NRBC BLD AUTO-RTO: 0 /100 WBC (ref 0–0.2)
PLATELET # BLD AUTO: 154 10*3/MM3 (ref 140–450)
PMV BLD AUTO: 9.8 FL (ref 6–12)
POTASSIUM SERPL-SCNC: 3.1 MMOL/L (ref 3.5–5.2)
PROT SERPL-MCNC: 5.6 G/DL (ref 6–8.5)
PROT SERPL-MCNC: 6.2 G/DL (ref 6–8.5)
RBC # BLD AUTO: 4.82 10*6/MM3 (ref 3.77–5.28)
SODIUM SERPL-SCNC: 142 MMOL/L (ref 136–145)
T4 FREE SERPL-MCNC: 2.45 NG/DL (ref 0.93–1.7)
TROPONIN T DELTA: 0 NG/L
TROPONIN T SERPL HS-MCNC: 13 NG/L
TSH SERPL DL<=0.05 MIU/L-ACNC: 0.01 UIU/ML (ref 0.27–4.2)
WBC NRBC COR # BLD AUTO: 5.8 10*3/MM3 (ref 3.4–10.8)

## 2024-02-21 PROCEDURE — G0378 HOSPITAL OBSERVATION PER HR: HCPCS

## 2024-02-21 PROCEDURE — 93880 EXTRACRANIAL BILAT STUDY: CPT

## 2024-02-21 PROCEDURE — 71045 X-RAY EXAM CHEST 1 VIEW: CPT

## 2024-02-21 PROCEDURE — 70450 CT HEAD/BRAIN W/O DYE: CPT

## 2024-02-21 PROCEDURE — 93005 ELECTROCARDIOGRAM TRACING: CPT | Performed by: EMERGENCY MEDICINE

## 2024-02-21 PROCEDURE — 99285 EMERGENCY DEPT VISIT HI MDM: CPT

## 2024-02-21 PROCEDURE — 84443 ASSAY THYROID STIM HORMONE: CPT | Performed by: EMERGENCY MEDICINE

## 2024-02-21 PROCEDURE — 25810000003 LACTATED RINGERS SOLUTION: Performed by: EMERGENCY MEDICINE

## 2024-02-21 PROCEDURE — 84439 ASSAY OF FREE THYROXINE: CPT | Performed by: EMERGENCY MEDICINE

## 2024-02-21 PROCEDURE — 93005 ELECTROCARDIOGRAM TRACING: CPT

## 2024-02-21 PROCEDURE — 82375 ASSAY CARBOXYHB QUANT: CPT | Performed by: EMERGENCY MEDICINE

## 2024-02-21 PROCEDURE — 84484 ASSAY OF TROPONIN QUANT: CPT | Performed by: EMERGENCY MEDICINE

## 2024-02-21 PROCEDURE — 82248 BILIRUBIN DIRECT: CPT | Performed by: EMERGENCY MEDICINE

## 2024-02-21 PROCEDURE — 36415 COLL VENOUS BLD VENIPUNCTURE: CPT | Performed by: EMERGENCY MEDICINE

## 2024-02-21 PROCEDURE — 85025 COMPLETE CBC W/AUTO DIFF WBC: CPT | Performed by: EMERGENCY MEDICINE

## 2024-02-21 PROCEDURE — 80053 COMPREHEN METABOLIC PANEL: CPT | Performed by: EMERGENCY MEDICINE

## 2024-02-21 RX ORDER — CHOLECALCIFEROL (VITAMIN D3) 125 MCG
5 CAPSULE ORAL NIGHTLY PRN
Status: DISCONTINUED | OUTPATIENT
Start: 2024-02-21 | End: 2024-02-23 | Stop reason: HOSPADM

## 2024-02-21 RX ORDER — SODIUM CHLORIDE 0.9 % (FLUSH) 0.9 %
10 SYRINGE (ML) INJECTION AS NEEDED
Status: DISCONTINUED | OUTPATIENT
Start: 2024-02-21 | End: 2024-02-23 | Stop reason: HOSPADM

## 2024-02-21 RX ORDER — ONDANSETRON 2 MG/ML
4 INJECTION INTRAMUSCULAR; INTRAVENOUS EVERY 6 HOURS PRN
Status: DISCONTINUED | OUTPATIENT
Start: 2024-02-21 | End: 2024-02-23 | Stop reason: HOSPADM

## 2024-02-21 RX ORDER — ACETAMINOPHEN 325 MG/1
650 TABLET ORAL EVERY 4 HOURS PRN
Status: DISCONTINUED | OUTPATIENT
Start: 2024-02-21 | End: 2024-02-22

## 2024-02-21 RX ORDER — POTASSIUM CHLORIDE 20 MEQ/1
20 TABLET, EXTENDED RELEASE ORAL 2 TIMES DAILY
COMMUNITY

## 2024-02-21 RX ORDER — ATORVASTATIN CALCIUM 20 MG/1
20 TABLET, FILM COATED ORAL DAILY
Status: DISCONTINUED | OUTPATIENT
Start: 2024-02-22 | End: 2024-02-22

## 2024-02-21 RX ORDER — SODIUM CHLORIDE 0.9 % (FLUSH) 0.9 %
10 SYRINGE (ML) INJECTION EVERY 12 HOURS SCHEDULED
Status: DISCONTINUED | OUTPATIENT
Start: 2024-02-21 | End: 2024-02-23 | Stop reason: HOSPADM

## 2024-02-21 RX ORDER — LOSARTAN POTASSIUM 50 MG/1
100 TABLET ORAL EVERY EVENING
Status: DISCONTINUED | OUTPATIENT
Start: 2024-02-21 | End: 2024-02-23 | Stop reason: HOSPADM

## 2024-02-21 RX ORDER — LEVOTHYROXINE SODIUM 0.12 MG/1
125 TABLET ORAL
Status: DISCONTINUED | OUTPATIENT
Start: 2024-02-22 | End: 2024-02-22

## 2024-02-21 RX ORDER — SODIUM CHLORIDE 9 MG/ML
40 INJECTION, SOLUTION INTRAVENOUS AS NEEDED
Status: DISCONTINUED | OUTPATIENT
Start: 2024-02-21 | End: 2024-02-23 | Stop reason: HOSPADM

## 2024-02-21 RX ORDER — LOSARTAN POTASSIUM 100 MG/1
100 TABLET ORAL EVERY EVENING
COMMUNITY

## 2024-02-21 RX ORDER — PANTOPRAZOLE SODIUM 40 MG/10ML
40 INJECTION, POWDER, LYOPHILIZED, FOR SOLUTION INTRAVENOUS
Status: DISCONTINUED | OUTPATIENT
Start: 2024-02-22 | End: 2024-02-22

## 2024-02-21 RX ORDER — HYDROCHLOROTHIAZIDE 25 MG/1
25 TABLET ORAL DAILY
Status: DISCONTINUED | OUTPATIENT
Start: 2024-02-22 | End: 2024-02-23 | Stop reason: HOSPADM

## 2024-02-21 RX ORDER — METOPROLOL SUCCINATE 25 MG/1
25 TABLET, EXTENDED RELEASE ORAL 2 TIMES DAILY
Status: DISCONTINUED | OUTPATIENT
Start: 2024-02-21 | End: 2024-02-23 | Stop reason: HOSPADM

## 2024-02-21 RX ORDER — HYDRALAZINE HYDROCHLORIDE 20 MG/ML
10 INJECTION INTRAMUSCULAR; INTRAVENOUS EVERY 6 HOURS PRN
Status: DISCONTINUED | OUTPATIENT
Start: 2024-02-21 | End: 2024-02-23 | Stop reason: HOSPADM

## 2024-02-21 RX ORDER — HYDRALAZINE HYDROCHLORIDE 25 MG/1
50 TABLET, FILM COATED ORAL 2 TIMES DAILY
Status: DISCONTINUED | OUTPATIENT
Start: 2024-02-21 | End: 2024-02-23 | Stop reason: HOSPADM

## 2024-02-21 RX ADMIN — LOSARTAN POTASSIUM 100 MG: 50 TABLET, FILM COATED ORAL at 19:39

## 2024-02-21 RX ADMIN — Medication 10 ML: at 21:56

## 2024-02-21 RX ADMIN — SODIUM CHLORIDE, POTASSIUM CHLORIDE, SODIUM LACTATE AND CALCIUM CHLORIDE 1000 ML: 600; 310; 30; 20 INJECTION, SOLUTION INTRAVENOUS at 09:21

## 2024-02-21 RX ADMIN — HYDRALAZINE HYDROCHLORIDE 50 MG: 25 TABLET ORAL at 21:54

## 2024-02-21 RX ADMIN — METOPROLOL SUCCINATE 25 MG: 25 TABLET, EXTENDED RELEASE ORAL at 21:54

## 2024-02-21 RX ADMIN — Medication 10 ML: at 16:06

## 2024-02-21 NOTE — H&P
"    Patient Care Team:  Ariel Rider MD as PCP - General (Family Medicine)  Sam Chao MD as Consulting Physician (Cardiology)    Chief complaint headache, near syncopal episode     Subjective     Patient is a 68 y.o. female with HTN, arthritis who presents with a near-syncopal episode this morning. She states she had an episode of heart burn this morning while in bed. When she rolled over, a wave of \"blackness\" came over her vision. She continued to feel lightheaded and weak all morning. She has had some dizziness over the weekend and the past couple days, but it was worse this morning.   She has history of syncope with acute illness. She had a cardiac workup last year with a normal echo and stress test.   She denies chest pain, shortness of breath, cough, abdominal pain, N/V/D.     Review of Systems   Constitutional:  Positive for activity change and fatigue. Negative for fever.   Respiratory:  Positive for chest tightness. Negative for cough and shortness of breath.    Cardiovascular:  Positive for chest pain.   Gastrointestinal:  Negative for abdominal pain, diarrhea, nausea and vomiting.   Neurological:  Positive for dizziness, weakness, light-headedness and headaches. Negative for syncope.          History  Past Medical History:   Diagnosis Date    Abnormal ECG 6/2/2023    Arthritis     Rheumatoid    Disease of thyroid gland     Hyperlipidemia     Hypertension     PONV (postoperative nausea and vomiting)      Past Surgical History:   Procedure Laterality Date    ABDOMINAL SURGERY      CHOLECYSTECTOMY      FINGER SURGERY      HYSTERECTOMY      INCISION AND DRAINAGE OF WOUND Right 04/06/2021    Procedure: Incision and drainage of abscess of right face/cheek;  Surgeon: Ariel Hernandes DO;  Location: [facility] MAIN OR;  Service: General;  Laterality: Right;    OOPHORECTOMY      THYROID SURGERY      TUBAL ABDOMINAL LIGATION      1 tube removed     Family History   Problem Relation Age of Onset    Diabetes " Mother     Heart attack Mother     Heart disease Mother     Heart failure Mother     COPD Father      Social History     Tobacco Use    Smoking status: Never    Smokeless tobacco: Never   Vaping Use    Vaping Use: Never used   Substance Use Topics    Alcohol use: Not Currently     Comment: Rarely do I drink    Drug use: Never     (Not in a hospital admission)    Allergies:  Lisinopril    Objective     Vital Signs  Temp:  [97.1 °F (36.2 °C)] 97.1 °F (36.2 °C)  Heart Rate:  [57-76] 60  Resp:  [16-20] 16  BP: (109-152)/(45-75) 123/49     Physical Exam:      General Appearance:    Alert, cooperative, in no acute distress   Head:    Normocephalic, without obvious abnormality, atraumatic   Eyes:            Lids and lashes normal, conjunctivae and sclerae normal, no   icterus, no pallor, corneas clear, PERRLA   Ears:    Ears appear intact with no abnormalities noted   Throat:   No oral lesions, no thrush, oral mucosa moist   Neck:   No adenopathy, supple, trachea midline, no thyromegaly, no   carotid bruit, no JVD   Lungs:     Clear to auscultation,respirations regular, even and                  unlabored    Heart:    Regular rhythm and normal rate, normal S1 and S2, no            murmur, no gallop, no rub, no click   Chest Wall:    No abnormalities observed   Abdomen:     Normal bowel sounds, no masses, no organomegaly, soft        non-tender, non-distended, no guarding, no rebound                tenderness   Extremities:   Moves all extremities well, no edema, no cyanosis, no             redness   Pulses:   Pulses palpable and equal bilaterally   Skin:   No bleeding, bruising or rash   Lymph nodes:   No palpable adenopathy   Neurologic:   Cranial nerves 2 - 12 grossly intact, sensation intact, DTR       present and equal bilaterally       Results Review:     Imaging Results (Last 24 Hours)       Procedure Component Value Units Date/Time    CT Head Without Contrast [[id number]] Collected: 02/21/24 0919     Updated:  02/21/24 0922    Narrative:      CT HEAD WO CONTRAST    Date of Exam: 2/21/2024 9:13 AM EST    Indication: Headache dizzy fell, passed out.    Comparison: None available.    Technique: Axial CT images were obtained of the head without contrast administration.  Coronal reconstructions were performed.  Automated exposure control and iterative reconstruction methods were used.      Findings:  There is no evidence of acute territorial infarction.    There is no acute intracranial hemorrhage. There are no extra-axial collections.    Ventricles and CSF spaces are symmetric. No mass effect nor hydrocephalus.    Brain parenchyma appears typical for age with mild to moderate subcortical and white matter hypoattenuation likely reflective of microvascular ischemic disease..     Paranasal sinuses and mastoid air cells are adequately aerated.     Osseous structures and orbits appear intact.      Impression:      Impression:  No acute intracranial process identified.      Electronically Signed: Mikhail Springer MD    2/21/2024 9:20 AM EST    Workstation ID: [id number]    XR Chest 1 View [[id number]] Collected: 02/21/24 0910     Updated: 02/21/24 0913    Narrative:      XR CHEST 1 VW    Date of Exam: 2/21/2024 9:00 AM EST    Indication: Weakness and dizziness and near syncope    Comparison: CT chest 2/2/2022    Findings:  The cardiomediastinal silhouette is within normal limits. Lungs are clear. No focal consolidation, pneumothorax, or significant pleural effusion. Osseous structures grossly intact.      Impression:      Impression:  No acute process.            Electronically Signed: Speedy Austin MD    2/21/2024 9:11 AM EST    Workstation ID: [id number]             Lab Results (last 24 hours)       Procedure Component Value Units Date/Time    High Sensitivity Troponin T 2Hr [[id number]] Collected: 02/21/24 1353    Specimen: Blood Updated: 02/21/24 1353    T4, Free [[id number]]  (Abnormal) Collected: 02/21/24 0901    Specimen: Blood  Updated: 02/21/24 1006     Free T4 2.45 ng/dL     Narrative:      Results may be falsely increased if patient taking Biotin.      High Sensitivity Troponin T [[id number]]  (Normal) Collected: 02/21/24 0901    Specimen: Blood Updated: 02/21/24 0936     HS Troponin T 13 ng/L     Narrative:      High Sensitive Troponin T Reference Range:  <14.0 ng/L- Negative Female for AMI  <22.0 ng/L- Negative Male for AMI  >=14 - Abnormal Female indicating possible myocardial injury.  >=22 - Abnormal Male indicating possible myocardial injury.   Clinicians would have to utilize clinical acumen, EKG, Troponin, and serial changes to determine if it is an Acute Myocardial Infarction or myocardial injury due to an underlying chronic condition.         TSH [[id number]]  (Abnormal) Collected: 02/21/24 0901    Specimen: Blood Updated: 02/21/24 0936     TSH 0.012 uIU/mL     Comprehensive Metabolic Panel [[id number]]  (Abnormal) Collected: 02/21/24 0901    Specimen: Blood Updated: 02/21/24 0933     Glucose 144 mg/dL      BUN 24 mg/dL      Creatinine 0.87 mg/dL      Sodium 142 mmol/L      Potassium 3.1 mmol/L      Comment: Slight hemolysis detected by analyzer. Result may be falsely elevated.        Chloride 104 mmol/L      CO2 28.0 mmol/L      Calcium 9.4 mg/dL      Total Protein 6.2 g/dL      Albumin 4.0 g/dL      ALT (SGPT) 377 U/L      AST (SGOT) 252 U/L      Comment: Slight hemolysis detected by analyzer. Result may be falsely elevated.        Alkaline Phosphatase 90 U/L      Total Bilirubin 1.0 mg/dL      Globulin 2.2 gm/dL      A/G Ratio 1.8 g/dL      BUN/Creatinine Ratio 27.6     Anion Gap 10.0 mmol/L      eGFR 73.1 mL/min/1.73     Narrative:      GFR Normal >60  Chronic Kidney Disease <60  Kidney Failure <15      CBC & Differential [[id number]]  (Abnormal) Collected: 02/21/24 0901    Specimen: Blood Updated: 02/21/24 0906    Narrative:      The following orders were created for panel order CBC & Differential.  Procedure                                Abnormality         Status                     ---------                               -----------         ------                     CBC Auto Differential[[id number]]        Abnormal            Final result                 Please view results for these tests on the individual orders.    CBC Auto Differential [[id number]]  (Abnormal) Collected: 02/21/24 0901    Specimen: Blood Updated: 02/21/24 0906     WBC 5.80 10*3/mm3      RBC 4.82 10*6/mm3      Hemoglobin 14.6 g/dL      Hematocrit 43.9 %      MCV 91.1 fL      MCH 30.3 pg      MCHC 33.2 g/dL      RDW 14.3 %      RDW-SD 45.1 fl      MPV 9.8 fL      Platelets 154 10*3/mm3      Neutrophil % 67.2 %      Lymphocyte % 19.4 %      Monocyte % 11.6 %      Eosinophil % 1.0 %      Basophil % 0.8 %      Neutrophils, Absolute 3.90 10*3/mm3      Lymphocytes, Absolute 1.10 10*3/mm3      Monocytes, Absolute 0.70 10*3/mm3      Eosinophils, Absolute 0.10 10*3/mm3      Basophils, Absolute 0.00 10*3/mm3      nRBC 0.0 /100 WBC     Carbon Monoxide, Blood [[id number]]  (Normal) Collected: 02/21/24 0901    Specimen: Blood Updated: 02/21/24 0905     Carbon Monoxide, Blood 1.4 %              I reviewed the patient's new clinical results.    Assessment & Plan       Near syncope    Near syncope / dizziness- patient had heart workup and normal echo 1 year ago. Head CT normal. Will get carotid doppler US   Headache - c/o headache in the back of her neck and occipital area. She is planning to have cervical fusion surgery soon. Head CT normal.   Heart burn - resolved. Trop normal, EKG no acute changes/ST elevation. CXR normal   Elevated T4, low TSH- TSH low at 0.012, T4 elevated at 2.45. will decrease levothyroxine dose to 125 mcg. Repeat TFTs outpatient.   Elevated liver enzymes- , . Patient denies heavy alcohol/Tylenol use. Will obtain hepatitis panel.     I discussed the patient's findings and my recommendations with patient.     GALE Cruz  Student  02/21/24  16:14 EST    I have reviewed above documentation and provided 100% of patient care.    Jenny Becker, APRN

## 2024-02-21 NOTE — ED PROVIDER NOTES
Subjective   History of Present Illness  Chief complaint colonic, feel like I am going to blacked out    History of present illness this is a 68-year-old female who states she woke up this morning and was having some heartburn she took some Tums she states that did not really change much it was centered burning in her upper abdomen into her chest she states she will get up move around and she felt funny in her head like she was going to pass out she never ultimately passed out but had to sit down several times.  She states it does not feel like vertigo where she spends around just feels like she is going to blackout.  She had a mild headache but no visual changes speech difficulty or paralysis there was no significant chest pain neck arm jaw pain or shortness of breath leg pain or swelling.  No recent long car ride plane ride immobilization foreign travels leg pain or swelling.  No antibiotic use or change in medicines and no urinary problems no black or bloody stool.      Review of Systems   Constitutional:  Negative for chills and fever.   Respiratory:  Negative for chest tightness and shortness of breath.    Cardiovascular:  Negative for chest pain and palpitations.   Gastrointestinal:  Negative for abdominal pain, blood in stool and vomiting.   Genitourinary:  Negative for difficulty urinating and dysuria.   Musculoskeletal:  Positive for neck pain. Negative for back pain.   Skin:  Negative for rash and wound.   Neurological:  Positive for dizziness and light-headedness. Negative for facial asymmetry and speech difficulty.   Psychiatric/Behavioral:  Negative for confusion.        Past Medical History:   Diagnosis Date    Abnormal ECG 6/2/2023    Arthritis     Rheumatoid    Disease of thyroid gland     Hyperlipidemia     Hypertension     PONV (postoperative nausea and vomiting)        Allergies   Allergen Reactions    Lisinopril Anaphylaxis       Past Surgical History:   Procedure Laterality Date    ABDOMINAL  SURGERY      CHOLECYSTECTOMY      FINGER SURGERY      HYSTERECTOMY      INCISION AND DRAINAGE OF WOUND Right 04/06/2021    Procedure: Incision and drainage of abscess of right face/cheek;  Surgeon: Ariel Hernandes DO;  Location: [facility] MAIN OR;  Service: General;  Laterality: Right;    OOPHORECTOMY      THYROID SURGERY      TUBAL ABDOMINAL LIGATION      1 tube removed       Family History   Problem Relation Age of Onset    Diabetes Mother     Heart attack Mother     Heart disease Mother     Heart failure Mother     COPD Father        Social History     Socioeconomic History    Marital status:    Tobacco Use    Smoking status: Never    Smokeless tobacco: Never   Vaping Use    Vaping Use: Never used   Substance and Sexual Activity    Alcohol use: Not Currently     Comment: Rarely do I drink    Drug use: Never    Sexual activity: Not Currently     Partners: Male     Birth control/protection: Hysterectomy     Prior to Admission medications    Medication Sig Start Date End Date Taking? Authorizing Provider   atorvastatin (LIPITOR) 20 MG tablet Take 1 tablet by mouth Daily. 6/28/18      hydrALAZINE (APRESOLINE) 50 MG tablet Take 1 tablet by mouth 2 (Two) Times a Day. 7/11/23   Allison Randall APRN   hydroCHLOROthiazide (HYDRODIURIL) 25 MG tablet Take 1 tablet by mouth 3 (Three) Times a Day.    Provider, MD Sugey   levothyroxine (SYNTHROID, LEVOTHROID) 137 MCG tablet Take 1 tablet by mouth Every Morning. 11/19/15      losartan (COZAAR) 50 MG tablet Take 2 tablets by mouth Daily. 6/19/23   Allison Randall APRN   metoprolol succinate XL (TOPROL-XL) 25 MG 24 hr tablet Take 1 tablet by mouth 2 (Two) Times a Day.    Provider, MD Sugey          Objective   Physical Exam  Constitutional 68-year-old female awake alert no distress triage vital signs reviewed.  HEENT extraocular muscles are intact pupils equal and reactive no nystagmus no photophobia mouth clear.  Neck supple no adenopathy no JV no bruits lungs  clear no retraction no use of accessories heart regular without murmur rub abdomen soft nontender good bowel sounds no peritoneal findings or pulsatile masses extremities pulses equal upper and lower extremities no edema no cords no Homans' sign no evidence of DVT skin warm and dry without rashes neurologic awake alert orientated x 4 no facial asymmetry speech normal no nystagmus tongue soft palate normal no drift the arms or legs toes downgoing no clonus normal finger-nose without focal weakness  Procedures           ED Course      Results for orders placed or performed during the hospital encounter of 02/21/24   Comprehensive Metabolic Panel    Specimen: Blood   Result Value Ref Range    Glucose 144 (H) 65 - 99 mg/dL    BUN 24 (H) 8 - 23 mg/dL    Creatinine 0.87 0.57 - 1.00 mg/dL    Sodium 142 136 - 145 mmol/L    Potassium 3.1 (L) 3.5 - 5.2 mmol/L    Chloride 104 98 - 107 mmol/L    CO2 28.0 22.0 - 29.0 mmol/L    Calcium 9.4 8.6 - 10.5 mg/dL    Total Protein 6.2 6.0 - 8.5 g/dL    Albumin 4.0 3.5 - 5.2 g/dL    ALT (SGPT) 377 (H) 1 - 33 U/L    AST (SGOT) 252 (H) 1 - 32 U/L    Alkaline Phosphatase 90 39 - 117 U/L    Total Bilirubin 1.0 0.0 - 1.2 mg/dL    Globulin 2.2 gm/dL    A/G Ratio 1.8 g/dL    BUN/Creatinine Ratio 27.6 (H) 7.0 - 25.0    Anion Gap 10.0 5.0 - 15.0 mmol/L    eGFR 73.1 >60.0 mL/min/1.73   High Sensitivity Troponin T    Specimen: Blood   Result Value Ref Range    HS Troponin T 13 <14 ng/L   Carbon Monoxide, Blood    Specimen: Blood   Result Value Ref Range    Carbon Monoxide, Blood 1.4 0 - 3 %   TSH    Specimen: Blood   Result Value Ref Range    TSH 0.012 (L) 0.270 - 4.200 uIU/mL   T4, Free    Specimen: Blood   Result Value Ref Range    Free T4 2.45 (H) 0.93 - 1.70 ng/dL   CBC Auto Differential    Specimen: Blood   Result Value Ref Range    WBC 5.80 3.40 - 10.80 10*3/mm3    RBC 4.82 3.77 - 5.28 10*6/mm3    Hemoglobin 14.6 12.0 - 15.9 g/dL    Hematocrit 43.9 34.0 - 46.6 %    MCV 91.1 79.0 - 97.0 fL     MCH 30.3 26.6 - 33.0 pg    MCHC 33.2 31.5 - 35.7 g/dL    RDW 14.3 12.3 - 15.4 %    RDW-SD 45.1 37.0 - 54.0 fl    MPV 9.8 6.0 - 12.0 fL    Platelets 154 140 - 450 10*3/mm3    Neutrophil % 67.2 42.7 - 76.0 %    Lymphocyte % 19.4 (L) 19.6 - 45.3 %    Monocyte % 11.6 5.0 - 12.0 %    Eosinophil % 1.0 0.3 - 6.2 %    Basophil % 0.8 0.0 - 1.5 %    Neutrophils, Absolute 3.90 1.70 - 7.00 10*3/mm3    Lymphocytes, Absolute 1.10 0.70 - 3.10 10*3/mm3    Monocytes, Absolute 0.70 0.10 - 0.90 10*3/mm3    Eosinophils, Absolute 0.10 0.00 - 0.40 10*3/mm3    Basophils, Absolute 0.00 0.00 - 0.20 10*3/mm3    nRBC 0.0 0.0 - 0.2 /100 WBC   ECG 12 Lead Chest Pain   Result Value Ref Range    QT Interval 431 ms    QTC Interval 505 ms     CT Head Without Contrast    Result Date: 2/21/2024  Impression: No acute intracranial process identified. Electronically Signed: Mikhail Springer MD  2/21/2024 9:20 AM EST  Workstation ID: [id number]    XR Chest 1 View    Result Date: 2/21/2024  Impression: No acute process. Electronically Signed: Speedy Austin MD  2/21/2024 9:11 AM EST  Workstation ID: [id number]   Medications   sodium chloride 0.9 % flush 10 mL (has no administration in time range)   lactated ringers bolus 1,000 mL (1,000 mL Intravenous New Bag 2/21/24 0921)                                  EKG my interpretation normal sinus rhythm rate of 83 left axis deviation QTc is little prolonged at 505 no acute ST elevation is an abnormal EKG slightly changed from 1/5/2024 patient's QTc at that time was 411 nonspecific T wave changes noted anteriorly but that is unchanged from 1/8/2024            Medical Decision Making  Medical decision making.  Patient IV established monitor placed my review of sinus rhythm.  Given a liter of lactated Ringer's.  The patient underwent an EKG my interpretation shows a normal sinus rhythm rate of 83 with left axis deviation nonspecific T wave changes noted anteriorly QTc is prolonged at 505 which is abnormal unchanged from  previous EKG on 1/8/2024.  Labs obtained my independent review competence metabolic profile unremarkable blood sugar 144 potassium 3.1 ALT was 377 with AST of 252.  Alk phos bilirubin unremarkable troponin negative.  Carbon monoxide level normal.  TSH of slight low at 0.012 and T4 of 2.45 CBC unremarkable.  Chest x-ray my independent review I do not see any evidence of pneumonia pneumothorax or CHF or acute findings.  Radiology unremarkable CT head without my dependent review no tumors masses hemorrhage or acute findings radiology unremarkable.  Patient repeat examination at 10:15 AM was resting comfortably she had no dysrhythmia or syncopal episodes.  I do not see evidence of acute myocardial infarction DVT pulm embolism CHF pericarditis myocarditis pericardial fusion stroke meningitis encephalitis although not a complete list of all possibilities.  I talked to the nurse practitioner for Dr. Lopez and the patient be placed in the hospital for cardiac monitoring and further workup for these near syncopal episodes.  Patient agreeable stable unremarkable ER course.  Record review shows the patient was in the hospital back in July 2023 she had a stress test at that time which was unremarkable.    Problems Addressed:  Near syncope: complicated acute illness or injury    Amount and/or Complexity of Data Reviewed  External Data Reviewed: ECG.  Labs: ordered. Decision-making details documented in ED Course.  Radiology: ordered and independent interpretation performed.  ECG/medicine tests: ordered and independent interpretation performed. Decision-making details documented in ED Course.    Risk  Prescription drug management.  Decision regarding hospitalization.        Final diagnoses:   Near syncope       ED Disposition  ED Disposition       ED Disposition   Decision to Admit    Condition   --    Comment   Level of Care: Telemetry [5]   Admitting Physician: MAKENZIE LOPEZ [[id number]]   Attending Physician: MAKENZIE LOPEZ [[id number]]                  No follow-up provider specified.       Medication List      No changes were made to your prescriptions during this visit.            Ariel Randhawa MD  02/21/24 [id number]

## 2024-02-21 NOTE — Clinical Note
Level of Care: Telemetry [5]   Admitting Physician: MAKENZIE LOPEZ [[id number]]   Attending Physician: MAKENZIE LOPEZ [[id number]]

## 2024-02-21 NOTE — CASE MANAGEMENT/SOCIAL WORK
Discharge Planning Assessment   Anam     Patient Name: Joi Cheng  MRN: [id number]  Today's Date: 2/21/2024    Admit Date: 2/21/2024    Plan: Return home   Discharge Needs Assessment       Row Name 02/21/24 1724       Living Environment    People in Home alone    Current Living Arrangements home    Potentially Unsafe Housing Conditions none    Primary Care Provided by self    Provides Primary Care For no one    Family Caregiver if Needed child(reginald), adult  Son-Lenard    Quality of Family Relationships helpful;involved;supportive    Able to Return to Prior Arrangements yes       Resource/Environmental Concerns    Resource/Environmental Concerns none    Transportation Concerns none       Transition Planning    Patient/Family Anticipates Transition to home    Patient/Family Anticipated Services at Transition none    Transportation Anticipated family or friend will provide  son or freind       Discharge Needs Assessment    Readmission Within the Last 30 Days no previous admission in last 30 days    Equipment Currently Used at Home none    Concerns to be Addressed no discharge needs identified                   Discharge Plan       Row Name 02/21/24 1723       Plan    Plan Return home    Plan Comments Spoke with patient at bedside, IADLs, confimed PCP and pharmacy, Denies transportation or medication coverage issues.                  Continued Care and Services - Admitted Since 2/21/2024    Coordination has not been started for this encounter.       Expected Discharge Date and Time       Expected Discharge Date Expected Discharge Time    Feb 22, 2024            Demographic Summary       Row Name 02/21/24 1729       General Information    Admission Type observation    Arrived From home    Required Notices Provided Observation Status Notice    Referral Source patient    Reason for Consult discharge planning    Preferred Language English                   Functional Status       Row Name 02/21/24 1720        Functional Status    Usual Activity Tolerance good    Current Activity Tolerance good       Functional Status, IADL    Medications independent    Meal Preparation independent    Housekeeping independent    Laundry independent    Shopping independent       Mental Status    General Appearance WDL WDL                Patient Forms       Row Name 02/21/24 1722       Patient Forms    Patient Observation Letter Delivered  2/21 Registration           Met with patient in room wearing PPE: mask, face shield/goggles, gloves, gown.      Maintained distance greater than six feet and spent less than 15 minutes in the room.     Danielle Robertson RN

## 2024-02-21 NOTE — ED NOTES
Pt states she started having heartburn that woke her up around 4am & intermittent spells of dizziness; pt denies chest pain; pt here with friend

## 2024-02-22 ENCOUNTER — APPOINTMENT (OUTPATIENT)
Dept: MRI IMAGING | Facility: HOSPITAL | Age: 69
DRG: 312 | End: 2024-02-22
Payer: MEDICARE

## 2024-02-22 ENCOUNTER — APPOINTMENT (OUTPATIENT)
Dept: CT IMAGING | Facility: HOSPITAL | Age: 69
DRG: 312 | End: 2024-02-22
Payer: MEDICARE

## 2024-02-22 ENCOUNTER — APPOINTMENT (OUTPATIENT)
Dept: ULTRASOUND IMAGING | Facility: HOSPITAL | Age: 69
DRG: 312 | End: 2024-02-22
Payer: MEDICARE

## 2024-02-22 LAB
AMMONIA BLD-SCNC: 29 UMOL/L (ref 11–51)
ANION GAP SERPL CALCULATED.3IONS-SCNC: 10 MMOL/L (ref 5–15)
BASOPHILS # BLD AUTO: 0 10*3/MM3 (ref 0–0.2)
BASOPHILS NFR BLD AUTO: 0.7 % (ref 0–1.5)
BUN SERPL-MCNC: 23 MG/DL (ref 8–23)
BUN/CREAT SERPL: 23.2 (ref 7–25)
CALCIUM SPEC-SCNC: 8.5 MG/DL (ref 8.6–10.5)
CEA SERPL-MCNC: 2.92 NG/ML
CHLORIDE SERPL-SCNC: 105 MMOL/L (ref 98–107)
CO2 SERPL-SCNC: 29 MMOL/L (ref 22–29)
CREAT SERPL-MCNC: 0.99 MG/DL (ref 0.57–1)
CRP SERPL-MCNC: 0.45 MG/DL (ref 0–0.5)
DEPRECATED RDW RBC AUTO: 48.1 FL (ref 37–54)
EGFRCR SERPLBLD CKD-EPI 2021: 62.6 ML/MIN/1.73
EOSINOPHIL # BLD AUTO: 0.1 10*3/MM3 (ref 0–0.4)
EOSINOPHIL NFR BLD AUTO: 1.9 % (ref 0.3–6.2)
ERYTHROCYTE [DISTWIDTH] IN BLOOD BY AUTOMATED COUNT: 14.5 % (ref 12.3–15.4)
FOLATE SERPL-MCNC: 12 NG/ML (ref 4.78–24.2)
GLUCOSE SERPL-MCNC: 107 MG/DL (ref 65–99)
HAV IGM SERPL QL IA: NORMAL
HBA1C MFR BLD: 6.3 % (ref 4.8–5.6)
HBV CORE IGM SERPL QL IA: NORMAL
HBV SURFACE AG SERPL QL IA: NORMAL
HCT VFR BLD AUTO: 36.9 % (ref 34–46.6)
HCV AB SER DONR QL: NORMAL
HGB BLD-MCNC: 12.1 G/DL (ref 12–15.9)
LYMPHOCYTES # BLD AUTO: 1 10*3/MM3 (ref 0.7–3.1)
LYMPHOCYTES NFR BLD AUTO: 23.6 % (ref 19.6–45.3)
MCH RBC QN AUTO: 29.4 PG (ref 26.6–33)
MCHC RBC AUTO-ENTMCNC: 32.7 G/DL (ref 31.5–35.7)
MCV RBC AUTO: 89.6 FL (ref 79–97)
MONOCYTES # BLD AUTO: 0.7 10*3/MM3 (ref 0.1–0.9)
MONOCYTES NFR BLD AUTO: 15.9 % (ref 5–12)
NEUTROPHILS NFR BLD AUTO: 2.4 10*3/MM3 (ref 1.7–7)
NEUTROPHILS NFR BLD AUTO: 57.9 % (ref 42.7–76)
NRBC BLD AUTO-RTO: 0.1 /100 WBC (ref 0–0.2)
PLATELET # BLD AUTO: 117 10*3/MM3 (ref 140–450)
PMV BLD AUTO: 9.6 FL (ref 6–12)
POTASSIUM SERPL-SCNC: 3.6 MMOL/L (ref 3.5–5.2)
POTASSIUM SERPL-SCNC: 3.9 MMOL/L (ref 3.5–5.2)
QT INTERVAL: 431 MS
QTC INTERVAL: 505 MS
RBC # BLD AUTO: 4.11 10*6/MM3 (ref 3.77–5.28)
SODIUM SERPL-SCNC: 144 MMOL/L (ref 136–145)
VIT B12 BLD-MCNC: 1176 PG/ML (ref 211–946)
WBC NRBC COR # BLD AUTO: 4.2 10*3/MM3 (ref 3.4–10.8)

## 2024-02-22 PROCEDURE — 85025 COMPLETE CBC W/AUTO DIFF WBC: CPT

## 2024-02-22 PROCEDURE — 72141 MRI NECK SPINE W/O DYE: CPT

## 2024-02-22 PROCEDURE — 70496 CT ANGIOGRAPHY HEAD: CPT

## 2024-02-22 PROCEDURE — 80048 BASIC METABOLIC PNL TOTAL CA: CPT

## 2024-02-22 PROCEDURE — 95992 CANALITH REPOSITIONING PROC: CPT

## 2024-02-22 PROCEDURE — 83036 HEMOGLOBIN GLYCOSYLATED A1C: CPT | Performed by: NURSE PRACTITIONER

## 2024-02-22 PROCEDURE — 86381 MITOCHONDRIAL ANTIBODY EACH: CPT | Performed by: INTERNAL MEDICINE

## 2024-02-22 PROCEDURE — 99221 1ST HOSP IP/OBS SF/LOW 40: CPT | Performed by: PSYCHIATRY & NEUROLOGY

## 2024-02-22 PROCEDURE — 80074 ACUTE HEPATITIS PANEL: CPT | Performed by: INTERNAL MEDICINE

## 2024-02-22 PROCEDURE — 86235 NUCLEAR ANTIGEN ANTIBODY: CPT | Performed by: INTERNAL MEDICINE

## 2024-02-22 PROCEDURE — G0378 HOSPITAL OBSERVATION PER HR: HCPCS

## 2024-02-22 PROCEDURE — 76705 ECHO EXAM OF ABDOMEN: CPT

## 2024-02-22 PROCEDURE — 82140 ASSAY OF AMMONIA: CPT | Performed by: INTERNAL MEDICINE

## 2024-02-22 PROCEDURE — 84132 ASSAY OF SERUM POTASSIUM: CPT | Performed by: INTERNAL MEDICINE

## 2024-02-22 PROCEDURE — 82378 CARCINOEMBRYONIC ANTIGEN: CPT | Performed by: INTERNAL MEDICINE

## 2024-02-22 PROCEDURE — 70498 CT ANGIOGRAPHY NECK: CPT

## 2024-02-22 PROCEDURE — 70551 MRI BRAIN STEM W/O DYE: CPT

## 2024-02-22 PROCEDURE — 86038 ANTINUCLEAR ANTIBODIES: CPT | Performed by: INTERNAL MEDICINE

## 2024-02-22 PROCEDURE — 86140 C-REACTIVE PROTEIN: CPT | Performed by: INTERNAL MEDICINE

## 2024-02-22 PROCEDURE — 97161 PT EVAL LOW COMPLEX 20 MIN: CPT

## 2024-02-22 PROCEDURE — 82746 ASSAY OF FOLIC ACID SERUM: CPT | Performed by: NURSE PRACTITIONER

## 2024-02-22 PROCEDURE — 25510000001 IOPAMIDOL PER 1 ML: Performed by: INTERNAL MEDICINE

## 2024-02-22 PROCEDURE — 82607 VITAMIN B-12: CPT | Performed by: NURSE PRACTITIONER

## 2024-02-22 PROCEDURE — 86225 DNA ANTIBODY NATIVE: CPT | Performed by: INTERNAL MEDICINE

## 2024-02-22 RX ORDER — PANTOPRAZOLE SODIUM 40 MG/1
40 TABLET, DELAYED RELEASE ORAL
Status: DISCONTINUED | OUTPATIENT
Start: 2024-02-23 | End: 2024-02-23 | Stop reason: HOSPADM

## 2024-02-22 RX ORDER — LEVOTHYROXINE SODIUM 0.1 MG/1
100 TABLET ORAL
Status: DISCONTINUED | OUTPATIENT
Start: 2024-02-23 | End: 2024-02-23 | Stop reason: HOSPADM

## 2024-02-22 RX ORDER — POTASSIUM CHLORIDE 1500 MG/1
20 TABLET, EXTENDED RELEASE ORAL 2 TIMES DAILY
Status: DISCONTINUED | OUTPATIENT
Start: 2024-02-22 | End: 2024-02-23 | Stop reason: HOSPADM

## 2024-02-22 RX ORDER — POTASSIUM CHLORIDE 20 MEQ/1
40 TABLET, EXTENDED RELEASE ORAL EVERY 4 HOURS
Qty: 4 TABLET | Refills: 0 | Status: COMPLETED | OUTPATIENT
Start: 2024-02-22 | End: 2024-02-22

## 2024-02-22 RX ADMIN — ATORVASTATIN CALCIUM 20 MG: 20 TABLET, FILM COATED ORAL at 09:18

## 2024-02-22 RX ADMIN — HYDROCHLOROTHIAZIDE 25 MG: 25 TABLET ORAL at 09:18

## 2024-02-22 RX ADMIN — EMPAGLIFLOZIN 10 MG: 10 TABLET, FILM COATED ORAL at 09:18

## 2024-02-22 RX ADMIN — POTASSIUM CHLORIDE 20 MEQ: 1500 TABLET, EXTENDED RELEASE ORAL at 20:20

## 2024-02-22 RX ADMIN — POTASSIUM CHLORIDE 40 MEQ: 1500 TABLET, EXTENDED RELEASE ORAL at 05:29

## 2024-02-22 RX ADMIN — Medication 10 ML: at 20:21

## 2024-02-22 RX ADMIN — POTASSIUM CHLORIDE 40 MEQ: 1500 TABLET, EXTENDED RELEASE ORAL at 09:18

## 2024-02-22 RX ADMIN — PANTOPRAZOLE SODIUM 40 MG: 40 INJECTION, POWDER, FOR SOLUTION INTRAVENOUS at 05:29

## 2024-02-22 RX ADMIN — LEVOTHYROXINE SODIUM 125 MCG: 0.12 TABLET ORAL at 05:29

## 2024-02-22 RX ADMIN — METOPROLOL SUCCINATE 25 MG: 25 TABLET, EXTENDED RELEASE ORAL at 20:20

## 2024-02-22 RX ADMIN — HYDRALAZINE HYDROCHLORIDE 50 MG: 25 TABLET ORAL at 20:19

## 2024-02-22 RX ADMIN — Medication 10 ML: at 09:18

## 2024-02-22 RX ADMIN — LOSARTAN POTASSIUM 100 MG: 50 TABLET, FILM COATED ORAL at 17:28

## 2024-02-22 RX ADMIN — IOPAMIDOL 100 ML: 755 INJECTION, SOLUTION INTRAVENOUS at 12:12

## 2024-02-22 RX ADMIN — HYDRALAZINE HYDROCHLORIDE 50 MG: 25 TABLET ORAL at 09:17

## 2024-02-22 RX ADMIN — METOPROLOL SUCCINATE 25 MG: 25 TABLET, EXTENDED RELEASE ORAL at 09:17

## 2024-02-22 NOTE — CONSULTS
"Primary Care Provider: Ariel Rider MD     Consult requested by: Dr. King    Reason for Consultation: Neurological evaluation /dizziness    History taken from: patient chart RN    Chief complaint: Dizziness       SUBJECTIVE:    History of present illness: Background per H&P: Joi Cheng is a 68 y.o. year old female who was evaluated in room 247 at [facility]    Source of information is the patient and the medical records and my discussion with the patient's nurse    This patient was admitted because she has reported dizziness.  It is very interesting, the way she describes it.  She states that she has history of vertigo in the past, several times but has been stable but since last Saturday she has been having this episode of what she called dizziness.  It is something that she describes as is inside her body and inside her head.  She states that she feels like her eyes are moving.  It is usually exacerbated by movement, for example laying down etc.  It is not typical fainting like feeling but she sometimes ports that she thinks that there is something that will happen.    Laying down has made it worse    But she reports that sometimes it does not go away when she is laying down for some time    No double vision, speech swallowing or breathing problems    No falls or injuries    No headaches but she has bilateral neck pain and she reports that she has had neck issues that she has to have addressed    No exposure to toxins or infection    No focal changes    No medication changes    Her vital signs and labs are stable    CT head was stable    She is going for go for CTA head and neck    I will request MRI brain and MRI cervical spine also    No passing out    As per admitting,  Patient is a 68 y.o. female with HTN, arthritis who presents with a near-syncopal episode this morning. She states she had an episode of heart burn this morning while in bed. When she rolled over, a wave of \"blackness\" came " over her vision. She continued to feel lightheaded and weak all morning. She has had some dizziness over the weekend and the past couple days, but it was worse this morning.   She has history of syncope with acute illness. She had a cardiac workup last year with a normal echo and stress test.   She denies chest pain, shortness of breath, cough, abdominal pain, N/V/D.      Review of Systems   Constitutional:  Positive for activity change and fatigue. Negative for fever.   Respiratory:  Positive for chest tightness. Negative for cough and shortness of breath.    Cardiovascular:  Positive for chest pain.   Gastrointestinal:  Negative for abdominal pain, diarrhea, nausea and vomiting.   Neurological:  Positive for dizziness, weakness, light-headedness and headaches. Negative for syncope.            - Portions of the above HPI were copied from previous encounters and edited as appropriate. PMH as detailed below.     Review of Systems   No fever chills rigors or sweats  No weight issues  No sleep problems  HEENT:  No speech problem, vision changes, facial asymmetry or pain, or neck problem  Chest: No chest pain, clubbing, cyanosis, orthopnea palpitations  Pulmonary:  No shortness of air, cough or expectoration  Abdomen:  No swelling/tension, constipation,diarrhea or pain  No genitourinary symptoms  Extremity problems as discussed  No back problem  No psychotic issues  Neurologic issues as discussed  No hematologic, dermatologic or endocrine problems        PATIENT HISTORY:  Past Medical History:   Diagnosis Date    Abnormal ECG 6/2/2023    Arthritis     Rheumatoid    Disease of thyroid gland     Hyperlipidemia     Hypertension     PONV (postoperative nausea and vomiting)    ,   Past Surgical History:   Procedure Laterality Date    ABDOMINAL SURGERY      CHOLECYSTECTOMY      FINGER SURGERY      HYSTERECTOMY      INCISION AND DRAINAGE OF WOUND Right 04/06/2021    Procedure: Incision and drainage of abscess of right  face/cheek;  Surgeon: Ariel Hernandes DO;  Location: [facility] MAIN OR;  Service: General;  Laterality: Right;    OOPHORECTOMY      THYROID SURGERY      TUBAL ABDOMINAL LIGATION      1 tube removed   ,   Family History   Problem Relation Age of Onset    Diabetes Mother     Heart attack Mother     Heart disease Mother     Heart failure Mother     COPD Father    ,   Social History     Tobacco Use    Smoking status: Never    Smokeless tobacco: Never   Vaping Use    Vaping Use: Never used   Substance Use Topics    Alcohol use: Not Currently     Comment: Rarely do I drink    Drug use: Never   ,   Prior to Admission medications    Medication Sig Start Date End Date Taking? Authorizing Provider   atorvastatin (LIPITOR) 20 MG tablet Take 1 tablet by mouth Daily. 6/28/18  Yes    dapagliflozin Propanediol (Farxiga) 10 MG tablet Take 10 mg by mouth Daily.   Yes Sugey Ghotra MD   hydrALAZINE (APRESOLINE) 50 MG tablet Take 1 tablet by mouth 2 (Two) Times a Day. 7/11/23  Yes Allison Randall APRN   hydroCHLOROthiazide (HYDRODIURIL) 25 MG tablet Take 1 tablet by mouth Daily.   Yes Sugey Ghotra MD   levothyroxine (SYNTHROID, LEVOTHROID) 137 MCG tablet Take 1 tablet by mouth Take As Directed. 6 days a week, nothing on [place] 11/19/15  Yes    losartan (COZAAR) 100 MG tablet Take 1 tablet by mouth Every Evening.   Yes Sugey Ghotra MD   metoprolol succinate XL (TOPROL-XL) 25 MG 24 hr tablet Take 1 tablet by mouth 2 (Two) Times a Day.   Yes Sugey Ghotra MD   potassium chloride (K-DUR,KLOR-CON) 20 MEQ CR tablet Take 1 tablet by mouth 2 (Two) Times a Day.   Yes Sugey Ghotra MD    Allergies:  Lisinopril    Current Facility-Administered Medications   Medication Dose Route Frequency Provider Last Rate Last Admin    acetaminophen (TYLENOL) tablet 650 mg  650 mg Oral Q4H PRN Jenny Becker APRN        atorvastatin (LIPITOR) tablet 20 mg  20 mg Oral Daily Jenny Becker APRN   20 mg at  02/22/24 0918    Calcium Replacement - Follow Nurse / BPA Driven Protocol   Does not apply PRN RositaJenny joya APRN        empagliflozin (JARDIANCE) tablet 10 mg  10 mg Oral Daily RositaJenny joya APRN   10 mg at 02/22/24 0918    hydrALAZINE (APRESOLINE) injection 10 mg  10 mg Intravenous Q6H PRN Jenny Becker APRN        hydrALAZINE (APRESOLINE) tablet 50 mg  50 mg Oral BID Jenny Becker APRN   50 mg at 02/22/24 0917    hydroCHLOROthiazide tablet 25 mg  25 mg Oral Daily RositaJenny joya APRN   25 mg at 02/22/24 0918    levothyroxine (SYNTHROID, LEVOTHROID) tablet 125 mcg  125 mcg Oral Q AM RositaJenny joya APRN   125 mcg at 02/22/24 0529    losartan (COZAAR) tablet 100 mg  100 mg Oral Q PM Jenny Becker APRN   100 mg at 02/21/24 1939    Magnesium Standard Dose Replacement - Follow Nurse / BPA Driven Protocol   Does not apply PRN RositaJenny joya APRN        melatonin tablet 5 mg  5 mg Oral Nightly PRN Jenny Becker APRN        metoprolol succinate XL (TOPROL-XL) 24 hr tablet 25 mg  25 mg Oral BID Jenny Becker APRN   25 mg at 02/22/24 0917    ondansetron (ZOFRAN) injection 4 mg  4 mg Intravenous Q6H PRN Jenny Becker APRN        pantoprazole (PROTONIX) injection 40 mg  40 mg Intravenous Q AM RositaJenny joya APRN   40 mg at 02/22/24 0529    Phosphorus Replacement - Follow Nurse / BPA Driven Protocol   Does not apply PRN RositaJenny joya APRN        Potassium Replacement - Follow Nurse / BPA Driven Protocol   Does not apply PRN RositaJenny joya APRN        sodium chloride 0.9 % flush 10 mL  10 mL Intravenous PRN Ariel Randhawa MD        sodium chloride 0.9 % flush 10 mL  10 mL Intravenous Q12H RositaJenny joya APRN   10 mL at 02/22/24 0918    sodium chloride 0.9 % flush 10 mL  10 mL Intravenous PRN Rosita, Jenny Tequila, APRN        sodium chloride 0.9 % infusion 40 mL  40 mL Intravenous PRN Jenny Becker, WENDY             ________________________________________________________        OBJECTIVE:  Upon today's exam, the patient is resting comfortably in bed in no acute distress     Neurologic Exam    The patient is awake and alert and oriented x3     Cranial nerve examination demonstrate:  Full fields of vision to confrontation  Pupils are round, reactive to light and accommodation and size of about 3 mm  No ptosis or nystagmus  Funduscopic examination was not successful  Eye movements are conjugate     Sensation on the face and scalp are normal  Muscles of mastication are normal and symmetric  Muscles of  facial expression are normal and symmetric  Hearing is intact bilaterally  Head turning and shoulder shrugs were unremarkable  Tongue was midline  I could not visualize  oropharynx or uvula      I did Trevor-Hallpike maneuver and it was positive on the right side     Motor examination:  Normal bulk, tone and strength was 5/5  No fasciculations     Sensory examination:  Intact for soft touch, pain   Romberg sign was not present     Reflexes:  0/4     Coordination:  Normal finger-to-nose to finger, rapid alternating movements and toe to finger     Gait:  Normal base, stride and turns.  She could do heel and toe walking but she was struggling with tandem gait     Toe signs:  [place]    ________________________________________________________   RESULTS REVIEW:    VITAL SIGNS:   Temp:  [97.7 °F (36.5 °C)-97.9 °F (36.6 °C)] 97.9 °F (36.6 °C)  Heart Rate:  [55-76] 58  Resp:  [14-17] 17  BP: (108-152)/(42-75) 148/74     LABS:      Lab 02/22/24  0257 02/21/24  0901   WBC 4.20 5.80   HEMOGLOBIN 12.1 14.6   HEMATOCRIT 36.9 43.9   PLATELETS 117* 154   NEUTROS ABS 2.40 3.90   LYMPHS ABS 1.00 1.10   MONOS ABS 0.70 0.70   EOS ABS 0.10 0.10   MCV 89.6 91.1   CRP 0.45  --          Lab 02/22/24  1127 02/22/24  0257 02/21/24  0901   SODIUM  --  144 142   POTASSIUM 3.9 3.6 3.1*   CHLORIDE  --  105 104   CO2  --  29.0 28.0   ANION GAP  --  10.0 10.0   BUN   --  23 24*   CREATININE  --  0.99 0.87   EGFR  --  62.6 73.1   GLUCOSE  --  107* 144*   CALCIUM  --  8.5* 9.4   TSH  --   --  0.012*         Lab 02/21/24  1353 02/21/24  0901   TOTAL PROTEIN 5.6* 6.2   ALBUMIN 3.6 4.0   GLOBULIN  --  2.2   ALT (SGPT) 332* 377*   AST (SGOT) 228* 252*   BILIRUBIN 0.9 1.0   INDIRECT BILIRUBIN 0.5  --    BILIRUBIN DIRECT 0.4*  --    ALK PHOS 78 90         Lab 02/21/24  1353 02/21/24  0901   HSTROP T 13 13                 UA          5/26/2023    16:38   Urinalysis   Squamous Epithelial Cells, UA 0-2    Specific Gravity, UA 1.010    Ketones, UA Negative    Blood, UA Negative    Leukocytes, UA Negative    Nitrite, UA Negative    RBC, UA 0-2    WBC, UA 0-2    Bacteria, UA None Seen        Lab Results   Component Value Date    TSH 0.012 (L) 02/21/2024    LDL 63 04/07/2021       IMAGING STUDIES:  CT Head Without Contrast    Result Date: 2/21/2024  Impression: No acute intracranial process identified. Electronically Signed: Mikhail Springer MD  2/21/2024 9:20 AM EST  Workstation ID: [id number]    XR Chest 1 View    Result Date: 2/21/2024  Impression: No acute process. Electronically Signed: Speedy Austin MD  2/21/2024 9:11 AM EST  Workstation ID: [id number]     I reviewed the patient's new clinical results.    ________________________________________________________     PROBLEM LIST:    Near syncope            ASSESSMENT/PLAN:  Likely vestibulopathy, more so on the right side, likely secondary to otolith dysfunction    BPPV possibility I will have PT OT evaluate her  I think she needs Epley maneuver    I will check MRI brain and MRI cervical spine    CTAs are pending but this does not look like VBI    I will check B12 and folate also    In some cases low-dose of meclizine for a few weeks, Klonopin 1.25 2.5 mg twice daily and even steroids may be considered        At this moment I would like to and, though my impression of the patient was not of the sort as she was very nice to me and she is a  "retired teacher.  When I was doing the Trevor-Hallpike maneuver and she got up she called me \"son of a bitch \"and then she said \"go to where you came from \".      I went back with the patient's nurse and told her that I did not like these comments.    I did explain to her that I understand that whenever patients have unexpected results or pain etc. at this part of the moment comments like that can happen but the comment about go to where you came from was something with reassure connotation.  She apologized and I told her that I have no problem taking care of her as long as she is okay with me.  And also I told her that I trust Dr. King and her group and I do not see any problem taking care of her if she is okay with me.  She explained to me what she meant by that but I did tell her that somebody else may take that, and differently and they were uncalled for.    I have no problem taking care of her as long as everybody is okay but if this continues or if she has a problem with me then we may have to make arrangements for evaluation by someone else                  I discussed the patient's findings and my recommendations with patient and nursing staff    Tiffani Villela MD  02/22/24  13:21 EST        "

## 2024-02-22 NOTE — PLAN OF CARE
Goal Outcome Evaluation:  Plan of Care Reviewed With: patient, son, friend        Progress: improving  Outcome Evaluation: 67 yo female adm 2/21/24 for acute onset of severe spinning sensation after awakening. CT head (-). MRI of head pending. Only significant PMH is RA. 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 She is independent for all mobility and drives at baseline. Extended family very supportive. Pt tested today for BPPV. Test results as follows:    Vestibular Exam    Smooth pursuit: normal  Spontaneous nystagmus: none  Gaze holding nystagmus: none  Saccades: negative  Convergence/divergence: normal  VOR slow: normal  Head thrust test: not completed  Head shaking nystagmus test: negative  Ponce hallpike for posterior canal: (+) to R side, (-) to L side  Roll test for horizontal canal: n/a  Orthostatic vitals: not completed, as pt was not reporting anything but spinning sensation  Standing balance: normal after epley maneuver completed  Gait: normal gait; able to also perform heel/toe gait w/o loss of balance once mod epley maneuver completed.     Pt had resolution of R upbeating nystagmus w/ one repetition of modified epley maneuver. 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 All symptoms now resolved. Recommend home at d/c. Will sign off.      Anticipated Discharge Disposition (PT): home

## 2024-02-22 NOTE — PLAN OF CARE
Problem: Adult Inpatient Plan of Care  Goal: Plan of Care Review  2/22/2024 1522 by Vilma Meyers RN  Outcome: Ongoing, Progressing  2/22/2024 1519 by Vilma Meyers RN  Outcome: Ongoing, Progressing  Goal: Patient-Specific Goal (Individualized)  2/22/2024 1522 by Vilma Meyers RN  Outcome: Ongoing, Progressing  2/22/2024 1519 by Vilma Meyers RN  Outcome: Ongoing, Progressing  Goal: Absence of Hospital-Acquired Illness or Injury  2/22/2024 1522 by Vilma Meyers RN  Outcome: Ongoing, Progressing  2/22/2024 1519 by Vilma Meyers RN  Outcome: Ongoing, Progressing  Intervention: Identify and Manage Fall Risk  Recent Flowsheet Documentation  Taken 2/22/2024 0800 by Vilma Meyers RN  Safety Promotion/Fall Prevention:   fall prevention program maintained   safety round/check completed  Intervention: Prevent Infection  Recent Flowsheet Documentation  Taken 2/22/2024 0800 by Vilma Meyers RN  Infection Prevention: rest/sleep promoted  Goal: Optimal Comfort and Wellbeing  2/22/2024 1522 by Vilma Meyers RN  Outcome: Ongoing, Progressing  2/22/2024 1519 by Vilma Meyers RN  Outcome: Ongoing, Progressing  Goal: Readiness for Transition of Care  2/22/2024 1522 by Vilma Meyers RN  Outcome: Ongoing, Progressing  2/22/2024 1519 by Vilma Meyers RN  Outcome: Ongoing, Progressing   Goal Outcome Evaluation:

## 2024-02-22 NOTE — THERAPY EVALUATION
Patient Name: Joi Cheng  : 1955    MRN: [id number]                              Today's Date: 2024       Admit Date: 2024    Visit Dx:     ICD-10-CM ICD-9-CM   1. Near syncope  R55 780.2     Patient Active Problem List   Diagnosis    Abscess of face    Hyperlipidemia    Essential hypertension    Hypothyroidism (acquired)    Hypokalemia    Elevated serum creatinine    Rheumatoid arthritis    Lower respiratory tract infection due to COVID-19 virus    Near syncope     Past Medical History:   Diagnosis Date    Abnormal ECG 2023    Arthritis     Rheumatoid    Disease of thyroid gland     Hyperlipidemia     Hypertension     PONV (postoperative nausea and vomiting)      Past Surgical History:   Procedure Laterality Date    ABDOMINAL SURGERY      CHOLECYSTECTOMY      FINGER SURGERY      HYSTERECTOMY      INCISION AND DRAINAGE OF WOUND Right 2021    Procedure: Incision and drainage of abscess of right face/cheek;  Surgeon: Ariel Hernandes DO;  Location: [facility] OR;  Service: General;  Laterality: Right;    OOPHORECTOMY      THYROID SURGERY      TUBAL ABDOMINAL LIGATION      1 tube removed      General Information       Row Name 24 1609          Physical Therapy Time and Intention    Document Type evaluation  -CM     Mode of Treatment physical therapy  -CM       Row Name 24 1609          General Information    Patient Profile Reviewed yes  -CM     Prior Level of Function independent:;gait  teaches part time  -CM     Existing Precautions/Restrictions no known precautions/restrictions  -CM       Row Name 24 1609          Living Environment    People in Home alone;other (see comments)  extended family very supportive  -CM       Row Name 24 1609          Home Main Entrance    Number of Stairs, Main Entrance two  -CM     Stair Railings, Main Entrance none  -CM       Row Name 24 1609          Cognition    Orientation Status (Cognition) oriented x 4  -CM                User Key  (r) = Recorded By, (t) = Taken By, (c) = Cosigned By      Initials Name Provider Type    Clarissa Gao, PT Physical Therapist                   Mobility       Row Name 02/22/24 1610          Bed Mobility    Bed Mobility bed mobility (all) activities  -CM     All Activities, [place] (Bed Mobility) independent  -CM       Row Name 02/22/24 1610          Sit-Stand Transfer    Sit-Stand [place] (Transfers) independent  -CM       Row Name 02/22/24 1610          Gait/Stairs (Locomotion)    [place] Level (Gait) independent  -CM     Patient was able to Ambulate yes  -CM     Distance in Feet (Gait) ambulating independently around room following modified epley maneuver and resolution of vertigo symptoms  -CM               User Key  (r) = Recorded By, (t) = Taken By, (c) = Cosigned By      Initials Name Provider Type    Clarissa Gao, PT Physical Therapist                   Obj/Interventions       Row Name 02/22/24 1611          Range of Motion Comprehensive    General Range of Motion no range of motion deficits identified  -CM       Row Name 02/22/24 1611          Strength Comprehensive (MMT)    General Manual Muscle Testing (MMT) Assessment no strength deficits identified  -CM       Row Name 02/22/24 1611          Balance    Balance Assessment sitting static balance;standing static balance;sitting dynamic balance;standing dynamic balance  -CM     Static Sitting Balance independent  -CM     Dynamic Sitting Balance independent  -CM     Position, Sitting Balance unsupported;sitting edge of bed  -CM     Static Standing Balance independent  -CM     Dynamic Standing Balance independent  -CM     Position/Device Used, Standing Balance unsupported  -CM       Row Name 02/22/24 1611          Sensory Assessment (Somatosensory)    Sensory Assessment (Somatosensory) sensation intact  -CM               User Key  (r) = Recorded By, (t) = Taken By, (c) = Cosigned By      Initials Name Provider  Type    CM Clarissa Hyman, PT Physical Therapist                   Goals/Plan    No documentation.                  Clinical Impression       Row Name 02/22/24 1611          Pain    Pretreatment Pain Rating 0/10 - no pain  -CM     Posttreatment Pain Rating 0/10 - no pain  -CM     Pain Intervention(s) Repositioned;Ambulation/increased activity;Emotional support  -CM       Row Name 02/22/24 1613 02/22/24 1611       Plan of Care Review    Plan of Care Reviewed With -- patient;son;friend  -CM    Progress -- improving  -CM    Outcome Evaluation 69 yo female adm 2/21/24 for acute onset of severe spinning sensation after awakening. CT head (-). MRI of head pending. Only significant PMH is RA. 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 She is independent for all mobility and drives at baseline. Extended family very supportive. Pt tested today for BPPV. Test results as follows:    Vestibular Exam    Smooth pursuit: normal  Spontaneous nystagmus: none  Gaze holding nystagmus: none  Saccades: negative  Convergence/divergence: normal  VOR slow: normal  Head thrust test: not completed  Head shaking nystagmus test: negative  [place] hallpike for posterior canal: (+) to R side, (-) to L side  Roll test for horizontal canal: n/a  Orthostatic vitals: not completed, as pt was not reporting anything but spinning sensation  Standing balance: normal after epley maneuver completed  Gait: normal gait; able to also perform heel/toe gait w/o loss of balance once mod epley maneuver completed.     Pt had resolution of R upbeating nystagmus w/ one repetition of modified epley maneuver. 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 All symptoms now resolved. Recommend home at d/c. Will sign off.  -CM --      Row Name 02/22/24 1618          Therapy Assessment/Plan (PT)    Criteria for Skilled Interventions Met (PT) no;no problems identified which require skilled intervention  -CM     Therapy Frequency  (PT) evaluation only  -CM               User Key  (r) = Recorded By, (t) = Taken By, (c) = Cosigned By      Initials Name Provider Type    Clarissa Gao, PT Physical Therapist                   Outcome Measures       Row Name 02/22/24 1618 02/22/24 0800       How much help from another person do you currently need...    Turning from your back to your side while in flat bed without using bedrails? 4  -CM 4  -BF    Moving from lying on back to sitting on the side of a flat bed without bedrails? 4  -CM 4  -BF    Moving to and from a bed to a chair (including a wheelchair)? 4  -CM 4  -BF    Standing up from a chair using your arms (e.g., wheelchair, bedside chair)? 4  -CM 4  -BF    Climbing 3-5 steps with a railing? 4  -CM 4  -BF    To walk in hospital room? 4  -CM 4  -BF    AM-PAC 6 Clicks Score (PT) 24  -CM 24  -BF    Highest Level of Mobility Goal 8 --> Walked 250 feet or more  -CM 8 --> Walked 250 feet or more  -BF      Row Name 02/22/24 1618          Modified [place] Scale    Pre-Stroke Modified [place] Scale 0 - No Symptoms at all.  -CM     Modified [place] Scale 0 - No Symptoms at all.  -CM       Row Name 02/22/24 1618          Functional Assessment    Outcome Measure Options Modified [place]  -CM               User Key  (r) = Recorded By, (t) = Taken By, (c) = Cosigned By      Initials Name Provider Type    Clarissa Gao, PT Physical Therapist    Vilma Barreto RN Registered Nurse                                 Physical Therapy Education       Title: PT OT SLP Therapies (Done)       Topic: Physical Therapy (Done)       Point: Mobility training (Done)       Learning Progress Summary             Patient Acceptance, E,TB, VU,DU by ARLINE at 2/22/2024 1619    Comment: reviewed signs/symptoms of cva w/ pt and family   Family Acceptance, E,TB, VU,DU by ARLINE at 2/22/2024 1619    Comment: reviewed signs/symptoms of cva w/ pt and family                                         User Key       Initials Effective  Dates Name Provider Type Discipline     06/16/21 -  Clarissa Hyman, PT Physical Therapist PT                  PT Recommendation and Plan     Plan of Care Reviewed With: patient, son, friend  Progress: improving  Outcome Evaluation: 67 yo female adm 2/21/24 for acute onset of severe spinning sensation after awakening. CT head (-). MRI of head pending. Only significant PMH is RA. 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 She is independent for all mobility and drives at baseline. Extended family very supportive. Pt tested today for BPPV. Test results as follows:    Vestibular Exam    Smooth pursuit: normal  Spontaneous nystagmus: none  Gaze holding nystagmus: none  Saccades: negative  Convergence/divergence: normal  VOR slow: normal  Head thrust test: not completed  Head shaking nystagmus test: negative  [place] hallpike for posterior canal: (+) to R side, (-) to L side  Roll test for horizontal canal: n/a  Orthostatic vitals: not completed, as pt was not reporting anything but spinning sensation  Standing balance: normal after epley maneuver completed  Gait: normal gait; able to also perform heel/toe gait w/o loss of balance once mod epley maneuver completed.     Pt had resolution of R upbeating nystagmus w/ one repetition of modified epley maneuver. 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 All symptoms now resolved. Recommend home at d/c. Will sign off.     Time Calculation:   PT Evaluation Complexity  History, PT Evaluation Complexity: no personal factors and/or comorbidities  Examination of Body Systems (PT Eval Complexity): total of 4 or more elements  Clinical Presentation (PT Evaluation Complexity): stable  Clinical Decision Making (PT Evaluation Complexity): low complexity  Overall Complexity (PT Evaluation Complexity): low complexity     PT Charges       Row Name 02/22/24 1620             Time Calculation    Start Time 1511  -CM      Stop Time 1546  -CM       Time Calculation (min) 35 min  -CM      PT Received On 02/22/24  -CM         Time Calculation- PT    Total Timed Code Minutes- PT 0 minute(s)  -CM                User Key  (r) = Recorded By, (t) = Taken By, (c) = Cosigned By      Initials Name Provider Type    Clarissa Gao, PT Physical Therapist                  Therapy Charges for Today       Code Description Service Date Service Provider Modifiers Qty    66094580876 HC PT EVAL LOW COMPLEXITY 4 2/22/2024 Clarissa Hyman, PT GP 1    24555409060 HC PT CANALITH REPOSITIONING PER DAY 2/22/2024 Clarissa Hyman, PT GP 1            PT G-Codes  Outcome Measure Options: Modified [place]  AM-PAC 6 Clicks Score (PT): 24  Modified [place] Scale: 0 - No Symptoms at all.  PT Discharge Summary  Anticipated Discharge Disposition (PT): home    Clarissa Hyman, PT  2/22/2024

## 2024-02-22 NOTE — PLAN OF CARE
Problem: Adult Inpatient Plan of Care  Goal: Plan of Care Review  Outcome: Ongoing, Progressing  Goal: Patient-Specific Goal (Individualized)  Outcome: Ongoing, Progressing  Goal: Absence of Hospital-Acquired Illness or Injury  Outcome: Ongoing, Progressing  Intervention: Identify and Manage Fall Risk  Recent Flowsheet Documentation  Taken 2/22/2024 0800 by Vilma Meyers RN  Safety Promotion/Fall Prevention:   fall prevention program maintained   safety round/check completed  Intervention: Prevent Infection  Recent Flowsheet Documentation  Taken 2/22/2024 0800 by Vilma Meyers RN  Infection Prevention: rest/sleep promoted  Goal: Optimal Comfort and Wellbeing  Outcome: Ongoing, Progressing  Goal: Readiness for Transition of Care  Outcome: Ongoing, Progressing   Goal Outcome Evaluation:

## 2024-02-22 NOTE — ED NOTES
Nursing report ED to floor  Joi Cheng  68 y.o.  female    HPI:   Chief Complaint   Patient presents with    Chest Pain     Feels like acid reflux, dizzy, pt feels she is going to black out.          Admitting doctor:   Altagracia King MD    Admitting diagnosis:   The encounter diagnosis was Near syncope.    Code status:   Current Code Status       Date Active Code Status Order ID Comments User Context       2/21/2024 1356 CPR (Attempt to Resuscitate) [id number]  Jenny Becker APRN ED        Question Answer    Code Status (Patient has no pulse and is not breathing) CPR (Attempt to Resuscitate)    Medical Interventions (Patient has pulse or is breathing) Full Support                    Allergies:   Lisinopril    Isolation:  No active isolations     Fall Risk:  Fall Risk Assessment was completed, and patient is at high risk for falls.   Predictive Model Details         9 (Low) Factor Value    Calculated 2/21/2024 20:50 Age 68    Risk of Fall Model Musculoskeletal Assessment WDL     Active Peripheral IV Present     Imaging order in this encounter Present     Skin Assessment WDL     Diastolic BP 54     Magnesium not on file     Financial Class Medicare     Drug Use No      U/L     [place] Scale not on file     Number of Distinct Medication Classes administered 2     Albumin 3.6 g/dL     Peripheral Vascular Assessment WDL     Total Bilirubin 0.9 mg/dL     Cardiac Assessment X     Gastrointestinal Assessment WDL     Days after Admission 0.516     Respiratory Rate 16     Number of administrations of Anti-Hypertensives 1     Calcium 9.4 mg/dL     Creatinine 0.87 mg/dL     Total Protein 5.6 g/dL     Potassium 3.1 mmol/L     Chloride 104 mmol/L         Weight:       02/21/24  0820   Weight: 75.3 kg (166 lb)       Intake and Output    Intake/Output Summary (Last 24 hours) at 2/21/2024 2050  Last data filed at 2/21/2024 1348  Gross per 24 hour   Intake 1000 ml   Output --   Net 1000 ml       Diet:   Dietary  Orders (From admission, onward)       Start     Ordered    02/21/24 1401  Diet: Cardiac Diets; Healthy Heart (2-3 Na+); Texture: Regular Texture (IDDSI 7); Fluid Consistency: Thin (IDDSI 0)  Diet Effective Now        References:    Diet Order [place]   Question Answer Comment   Diets: Cardiac Diets    Cardiac Diet: Healthy Heart (2-3 Na+)    Texture: Regular Texture (IDDSI 7)    Fluid Consistency: Thin (IDDSI 0)        02/21/24 1400                     Most recent vitals:   Vitals:    02/21/24 1532 02/21/24 1610 02/21/24 1902 02/21/24 1959   BP: 152/75 123/49 110/42 111/54   BP Location:       Patient Position:       Pulse: 76 60 55 63   Resp: 16      Temp:       TempSrc:       SpO2: 98% 94% 96% 93%   Weight:       Height:           Active LDAs/IV Access:   Lines, Drains & Airways       Active LDAs       Name Placement date Placement time Site Days    Peripheral IV 02/21/24 0900 Left Antecubital 02/21/24  0900  Antecubital  less than 1                    Skin Condition:   Skin Assessments (last day)       None             Labs (abnormal labs have a star):   Labs Reviewed   COMPREHENSIVE METABOLIC PANEL - Abnormal; Notable for the following components:       Result Value    Glucose 144 (*)     BUN 24 (*)     Potassium 3.1 (*)     ALT (SGPT) 377 (*)     AST (SGOT) 252 (*)     BUN/Creatinine Ratio 27.6 (*)     All other components within normal limits    Narrative:     GFR Normal >60  Chronic Kidney Disease <60  Kidney Failure <15     TSH - Abnormal; Notable for the following components:    TSH 0.012 (*)     All other components within normal limits   T4, FREE - Abnormal; Notable for the following components:    Free T4 2.45 (*)     All other components within normal limits    Narrative:     Results may be falsely increased if patient taking Biotin.     CBC WITH AUTO DIFFERENTIAL - Abnormal; Notable for the following components:    Lymphocyte % 19.4 (*)     All other components within normal limits   HEPATIC FUNCTION  PANEL - Abnormal; Notable for the following components:    Total Protein 5.6 (*)     ALT (SGPT) 332 (*)     AST (SGOT) 228 (*)     Bilirubin, Direct 0.4 (*)     All other components within normal limits   TROPONIN - Normal    Narrative:     High Sensitive Troponin T Reference Range:  <14.0 ng/L- Negative Female for AMI  <22.0 ng/L- Negative Male for AMI  >=14 - Abnormal Female indicating possible myocardial injury.  >=22 - Abnormal Male indicating possible myocardial injury.   Clinicians would have to utilize clinical acumen, EKG, Troponin, and serial changes to determine if it is an Acute Myocardial Infarction or myocardial injury due to an underlying chronic condition.        CARBON MONOXIDE, BLOOD - Normal   HIGH SENSITIVITIY TROPONIN T 2HR - Normal    Narrative:     High Sensitive Troponin T Reference Range:  <14.0 ng/L- Negative Female for AMI  <22.0 ng/L- Negative Male for AMI  >=14 - Abnormal Female indicating possible myocardial injury.  >=22 - Abnormal Male indicating possible myocardial injury.   Clinicians would have to utilize clinical acumen, EKG, Troponin, and serial changes to determine if it is an Acute Myocardial Infarction or myocardial injury due to an underlying chronic condition.        CBC AND DIFFERENTIAL    Narrative:     The following orders were created for panel order CBC & Differential.  Procedure                               Abnormality         Status                     ---------                               -----------         ------                     CBC Auto Differential[[id number]]        Abnormal            Final result                 Please view results for these tests on the individual orders.       LOC: Person, Place, Time, and Situation    Telemetry:  Med/Surg    Cardiac Monitoring Ordered: yes    EKG:   ECG 12 Lead Chest Pain   Preliminary Result   HEART RATE= 83  bpm   RR Interval= 728  ms   NC Interval= 186  ms   P Horizontal Axis= -11  deg   P Front Axis= 54  deg   QRSD  Interval= 111  ms   QT Interval= 431  ms   QTcB= 505  ms   QRS Axis= -55  deg   T Wave Axis= 58  deg   - ABNORMAL ECG -   Sinus rhythm   Left axis deviation   Incomplete right bundle branch block   Low voltage, precordial leads   Prolonged QT interval   When compared with ECG of 08-Jan-2024 10:26:01,   Significant rate increase   Significant repolarization change   Significant axis, voltage or hypertrophy change   Electronically Signed By:    Date and Time of Study: 2024-02-21 08:25:23          Medications Given in the ED:   Medications   sodium chloride 0.9 % flush 10 mL (has no administration in time range)   atorvastatin (LIPITOR) tablet 20 mg (has no administration in time range)   empagliflozin (JARDIANCE) tablet 10 mg (has no administration in time range)   hydrALAZINE (APRESOLINE) tablet 50 mg (has no administration in time range)   hydroCHLOROthiazide tablet 25 mg (has no administration in time range)   losartan (COZAAR) tablet 100 mg (100 mg Oral Given 2/21/24 1939)   metoprolol succinate XL (TOPROL-XL) 24 hr tablet 25 mg (has no administration in time range)   sodium chloride 0.9 % flush 10 mL (10 mL Intravenous Given 2/21/24 1606)   sodium chloride 0.9 % flush 10 mL (has no administration in time range)   sodium chloride 0.9 % infusion 40 mL (has no administration in time range)   Potassium Replacement - Follow Nurse / BPA Driven Protocol (has no administration in time range)   Magnesium Standard Dose Replacement - Follow Nurse / BPA Driven Protocol (has no administration in time range)   Phosphorus Replacement - Follow Nurse / BPA Driven Protocol (has no administration in time range)   Calcium Replacement - Follow Nurse / BPA Driven Protocol (has no administration in time range)   acetaminophen (TYLENOL) tablet 650 mg (has no administration in time range)   melatonin tablet 5 mg (has no administration in time range)   ondansetron (ZOFRAN) injection 4 mg (has no administration in time range)   hydrALAZINE  (APRESOLINE) injection 10 mg (has no administration in time range)   pantoprazole (PROTONIX) injection 40 mg (has no administration in time range)   levothyroxine (SYNTHROID, LEVOTHROID) tablet 125 mcg (has no administration in time range)   lactated ringers bolus 1,000 mL (0 mL Intravenous Stopped 2/21/24 1348)       Imaging results:  CT Head Without Contrast    Result Date: 2/21/2024  Impression: No acute intracranial process identified. Electronically Signed: Mikhail Springer MD  2/21/2024 9:20 AM EST  Workstation ID: [id number]    XR Chest 1 View    Result Date: 2/21/2024  Impression: No acute process. Electronically Signed: Speedy Austin MD  2/21/2024 9:11 AM EST  Workstation ID: [id number]     Social issues:   Social History     Socioeconomic History    Marital status:    Tobacco Use    Smoking status: Never    Smokeless tobacco: Never   Vaping Use    Vaping Use: Never used   Substance and Sexual Activity    Alcohol use: Not Currently     Comment: Rarely do I drink    Drug use: Never    Sexual activity: Not Currently     Partners: Male     Birth control/protection: Hysterectomy       NIH Stroke Scale:  Interval: (not recorded)  1a. Level of Consciousness: (not recorded)  1b. LOC Questions: (not recorded)  1c. LOC Commands: (not recorded)  2. Best Gaze: (not recorded)  3. Visual: (not recorded)  4. Facial Palsy: (not recorded)  5a. Motor Arm, Left: (not recorded)  5b. Motor Arm, Right: (not recorded)  6a. Motor Leg, Left: (not recorded)  6b. Motor Leg, Right: (not recorded)  7. Limb Ataxia: (not recorded)  8. Sensory: (not recorded)  9. Best Language: (not recorded)  10. Dysarthria: (not recorded)  11. Extinction and Inattention (formerly Neglect): (not recorded)    Total (NIH Stroke Scale): (not recorded)     Additional notable assessment information:     Nursing report ED to floor:  OBS TALI Carpenter RN   02/21/24 20:50 EST

## 2024-02-23 VITALS
OXYGEN SATURATION: 97 % | WEIGHT: 166.23 LBS | DIASTOLIC BLOOD PRESSURE: 59 MMHG | RESPIRATION RATE: 14 BRPM | SYSTOLIC BLOOD PRESSURE: 144 MMHG | HEART RATE: 50 BPM | HEIGHT: 65 IN | TEMPERATURE: 98.1 F | BODY MASS INDEX: 27.7 KG/M2

## 2024-02-23 LAB
ALBUMIN SERPL-MCNC: 3.5 G/DL (ref 3.5–5.2)
ALBUMIN/GLOB SERPL: 1.8 G/DL
ALP SERPL-CCNC: 77 U/L (ref 39–117)
ALT SERPL W P-5'-P-CCNC: 288 U/L (ref 1–33)
ANION GAP SERPL CALCULATED.3IONS-SCNC: 9 MMOL/L (ref 5–15)
AST SERPL-CCNC: 202 U/L (ref 1–32)
BASOPHILS # BLD AUTO: 0 10*3/MM3 (ref 0–0.2)
BASOPHILS NFR BLD AUTO: 0.6 % (ref 0–1.5)
BILIRUB SERPL-MCNC: 0.6 MG/DL (ref 0–1.2)
BUN SERPL-MCNC: 21 MG/DL (ref 8–23)
BUN/CREAT SERPL: 22.6 (ref 7–25)
CALCIUM SPEC-SCNC: 8.7 MG/DL (ref 8.6–10.5)
CHLORIDE SERPL-SCNC: 104 MMOL/L (ref 98–107)
CO2 SERPL-SCNC: 30 MMOL/L (ref 22–29)
CREAT SERPL-MCNC: 0.93 MG/DL (ref 0.57–1)
DEPRECATED RDW RBC AUTO: 47.3 FL (ref 37–54)
EGFRCR SERPLBLD CKD-EPI 2021: 67.5 ML/MIN/1.73
EOSINOPHIL # BLD AUTO: 0.1 10*3/MM3 (ref 0–0.4)
EOSINOPHIL NFR BLD AUTO: 1.8 % (ref 0.3–6.2)
ERYTHROCYTE [DISTWIDTH] IN BLOOD BY AUTOMATED COUNT: 14.4 % (ref 12.3–15.4)
GLOBULIN UR ELPH-MCNC: 2 GM/DL
GLUCOSE BLDC GLUCOMTR-MCNC: 115 MG/DL (ref 70–105)
GLUCOSE SERPL-MCNC: 108 MG/DL (ref 65–99)
HCT VFR BLD AUTO: 37.2 % (ref 34–46.6)
HGB BLD-MCNC: 12.2 G/DL (ref 12–15.9)
LYMPHOCYTES # BLD AUTO: 1.1 10*3/MM3 (ref 0.7–3.1)
LYMPHOCYTES NFR BLD AUTO: 23.9 % (ref 19.6–45.3)
MCH RBC QN AUTO: 29.5 PG (ref 26.6–33)
MCHC RBC AUTO-ENTMCNC: 32.9 G/DL (ref 31.5–35.7)
MCV RBC AUTO: 89.8 FL (ref 79–97)
MITOCHONDRIA M2 IGG SER-ACNC: <20 UNITS (ref 0–20)
MONOCYTES # BLD AUTO: 0.7 10*3/MM3 (ref 0.1–0.9)
MONOCYTES NFR BLD AUTO: 14.4 % (ref 5–12)
NEUTROPHILS NFR BLD AUTO: 2.7 10*3/MM3 (ref 1.7–7)
NEUTROPHILS NFR BLD AUTO: 59.3 % (ref 42.7–76)
NRBC BLD AUTO-RTO: 0.1 /100 WBC (ref 0–0.2)
PLATELET # BLD AUTO: 124 10*3/MM3 (ref 140–450)
PMV BLD AUTO: 10.9 FL (ref 6–12)
POTASSIUM SERPL-SCNC: 3.4 MMOL/L (ref 3.5–5.2)
PROT SERPL-MCNC: 5.5 G/DL (ref 6–8.5)
RBC # BLD AUTO: 4.14 10*6/MM3 (ref 3.77–5.28)
SODIUM SERPL-SCNC: 143 MMOL/L (ref 136–145)
WBC NRBC COR # BLD AUTO: 4.6 10*3/MM3 (ref 3.4–10.8)

## 2024-02-23 PROCEDURE — 97165 OT EVAL LOW COMPLEX 30 MIN: CPT

## 2024-02-23 PROCEDURE — 80053 COMPREHEN METABOLIC PANEL: CPT | Performed by: INTERNAL MEDICINE

## 2024-02-23 PROCEDURE — 86644 CMV ANTIBODY: CPT | Performed by: INTERNAL MEDICINE

## 2024-02-23 PROCEDURE — 86645 CMV ANTIBODY IGM: CPT | Performed by: INTERNAL MEDICINE

## 2024-02-23 PROCEDURE — 86665 EPSTEIN-BARR CAPSID VCA: CPT | Performed by: INTERNAL MEDICINE

## 2024-02-23 PROCEDURE — 86664 EPSTEIN-BARR NUCLEAR ANTIGEN: CPT | Performed by: INTERNAL MEDICINE

## 2024-02-23 PROCEDURE — 82948 REAGENT STRIP/BLOOD GLUCOSE: CPT

## 2024-02-23 PROCEDURE — 85025 COMPLETE CBC W/AUTO DIFF WBC: CPT

## 2024-02-23 RX ORDER — POTASSIUM CHLORIDE 20 MEQ/1
40 TABLET, EXTENDED RELEASE ORAL EVERY 4 HOURS
Status: DISPENSED | OUTPATIENT
Start: 2024-02-23 | End: 2024-02-23

## 2024-02-23 RX ADMIN — LEVOTHYROXINE SODIUM 100 MCG: 0.1 TABLET ORAL at 05:18

## 2024-02-23 RX ADMIN — EMPAGLIFLOZIN 10 MG: 10 TABLET, FILM COATED ORAL at 08:18

## 2024-02-23 RX ADMIN — PANTOPRAZOLE SODIUM 40 MG: 40 TABLET, DELAYED RELEASE ORAL at 05:18

## 2024-02-23 RX ADMIN — METOPROLOL SUCCINATE 25 MG: 25 TABLET, EXTENDED RELEASE ORAL at 08:18

## 2024-02-23 RX ADMIN — HYDROCHLOROTHIAZIDE 25 MG: 25 TABLET ORAL at 08:18

## 2024-02-23 RX ADMIN — Medication 10 ML: at 08:25

## 2024-02-23 RX ADMIN — POTASSIUM CHLORIDE 20 MEQ: 1500 TABLET, EXTENDED RELEASE ORAL at 05:18

## 2024-02-23 RX ADMIN — HYDRALAZINE HYDROCHLORIDE 50 MG: 25 TABLET ORAL at 08:18

## 2024-02-23 RX ADMIN — POTASSIUM CHLORIDE 40 MEQ: 1500 TABLET, EXTENDED RELEASE ORAL at 05:20

## 2024-02-23 NOTE — PLAN OF CARE
Goal Outcome Evaluation:  Plan of Care Reviewed With: patient           Outcome Evaluation: 67 yo female adm 2/21/24 for acute onset of severe spinning sensation after awakening. CT head (-). MRI of head pending. Only significant PMH is RA. 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 She is independent for all mobility and drives at baseline. She works part time as a high abilities teacher. PT saw patient yesterday, and pt tested positive for BPPV. After treatment, symptoms have resolved as noted during today's OT evaluation. She has no ongoing deficits at this time. Pt has good BUE strength and ROM, she ambulates independently and retains her ADL independence. She has no more dizziness. OT will sign off.      Anticipated Discharge Disposition (OT): home

## 2024-02-23 NOTE — THERAPY EVALUATION
Patient Name: Joi Cheng  : 1955    MRN: [id number]                              Today's Date: 2024       Admit Date: 2024    Visit Dx:     ICD-10-CM ICD-9-CM   1. Near syncope  R55 780.2     Patient Active Problem List   Diagnosis    Abscess of face    Hyperlipidemia    Essential hypertension    Hypothyroidism (acquired)    Hypokalemia    Elevated serum creatinine    Rheumatoid arthritis    Lower respiratory tract infection due to COVID-19 virus    Near syncope     Past Medical History:   Diagnosis Date    Abnormal ECG 2023    Arthritis     Rheumatoid    Disease of thyroid gland     Hyperlipidemia     Hypertension     PONV (postoperative nausea and vomiting)      Past Surgical History:   Procedure Laterality Date    ABDOMINAL SURGERY      CHOLECYSTECTOMY      FINGER SURGERY      HYSTERECTOMY      INCISION AND DRAINAGE OF WOUND Right 2021    Procedure: Incision and drainage of abscess of right face/cheek;  Surgeon: Ariel Hernandes DO;  Location: [facility];  Service: General;  Laterality: Right;    OOPHORECTOMY      THYROID SURGERY      TUBAL ABDOMINAL LIGATION      1 tube removed      General Information       Row Name 24 1623          OT Time and Intention    Document Type evaluation  -     Mode of Treatment occupational therapy  -       Row Name 24 1623          General Information    Patient Profile Reviewed yes  -LS     Prior Level of Function independent:;driving;work;all household mobility  -     Existing Precautions/Restrictions no known precautions/restrictions  -LS     Barriers to Rehab none identified  -       Row Name 24 1623          Living Environment    People in Home alone  -LS       Row Name 24 1623          Home Main Entrance    Number of Stairs, Main Entrance two  -LS     Stair Railings, Main Entrance none  -LS       Row Name 24 1623          Cognition    Orientation Status (Cognition) oriented x 4  -LS                User Key  (r) = Recorded By, (t) = Taken By, (c) = Cosigned By      Initials Name Provider Type    LS Endy Ny OT Occupational Therapist                     Mobility/ADL's       Row Name 02/23/24 1624          Bed Mobility    Bed Mobility bed mobility (all) activities  -LS     All Activities, [place] (Bed Mobility) independent  -LS       Row Name 02/23/24 1624          Transfers    Transfers sit-stand transfer;bed-chair transfer  -LS       Row Name 02/23/24 1624          Bed-Chair Transfer    Bed-Chair [place] (Transfers) independent  -LS       Row Name 02/23/24 1624          Sit-Stand Transfer    Sit-Stand [place] (Transfers) independent  -LS       Row Name 02/23/24 1624          Functional Mobility    Functional Mobility- Ind. Level independent  -       Row Name 02/23/24 1624          Activities of Daily Living    BADL Assessment/Intervention other (see comments)  Retains ADL [place]  -LS               User Key  (r) = Recorded By, (t) = Taken By, (c) = Cosigned By      Initials Name Provider Type    LS Endy Ny OT Occupational Therapist                   Obj/Interventions       Row Name 02/23/24 1625          Sensory Assessment (Somatosensory)    Sensory Assessment (Somatosensory) sensation intact  -       Row Name 02/23/24 1625          Vision Assessment/Intervention    Visual Impairment/Limitations WFL  -       Row Name 02/23/24 1625          Range of Motion Comprehensive    General Range of Motion no range of motion deficits identified  -       Row Name 02/23/24 1625          Strength Comprehensive (MMT)    General Manual Muscle Testing (MMT) Assessment no strength deficits identified  -       Row Name 02/23/24 1625          Balance    Balance Assessment sitting static balance;sitting dynamic balance;standing dynamic balance;standing static balance  -LS     Static Sitting Balance independent  -LS     Dynamic Sitting Balance independent  -LS     Position, Sitting Balance  unsupported  -LS     Static Standing Balance independent  -LS     Dynamic Standing Balance independent  -LS     Position/Device Used, Standing Balance unsupported  -LS               User Key  (r) = Recorded By, (t) = Taken By, (c) = Cosigned By      Initials Name Provider Type    Endy Fletcher, BRANDON Occupational Therapist                   Goals/Plan    No documentation.                  Clinical Impression       Row Name 02/23/24 1625          Pain Assessment    Pretreatment Pain Rating 0/10 - no pain  -LS     Posttreatment Pain Rating 0/10 - no pain  -LS       Row Name 02/23/24 1625          Plan of Care Review    Plan of Care Reviewed With patient  -LS     Outcome Evaluation 69 yo female adm 2/21/24 for acute onset of severe spinning sensation after awakening. CT head (-). MRI of head pending. Only significant PMH is RA. 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 She is independent for all mobility and drives at baseline. She works part time as a high abilities teacher. PT saw patient yesterday, and pt tested positive for BPPV. After treatment, symptoms have resolved as noted during today's OT evaluation. She has no ongoing deficits at this time. Pt has good BUE strength and ROM, she ambulates independently and retains her ADL independence. She has no more dizziness. OT will sign off.  -       Row Name 02/23/24 1625          Therapy Assessment/Plan (OT)    Criteria for Skilled Therapeutic Interventions Met (OT) no;no problems identified which require skilled intervention  -LS     Therapy Frequency (OT) evaluation only  -LS     Predicted Duration of Therapy Intervention (OT) eval only  -LS       Row Name 02/23/24 1625          Therapy Plan Review/Discharge Plan (OT)    Anticipated Discharge Disposition (OT) home  -       Row Name 02/23/24 1625          Vital Signs    O2 Delivery Pre Treatment room air  -LS     O2 Delivery Intra Treatment room air  -LS     O2 Delivery Post Treatment room air   -LS     Pre Patient Position Supine  -LS     Intra Patient Position Standing  -LS     Post Patient Position Supine  -LS       Row Name 02/23/24 1625          Positioning and Restraints    Pre-Treatment Position in bed  -LS     Post Treatment Position bed  -LS     In Bed fowlers;call light within reach  -LS               User Key  (r) = Recorded By, (t) = Taken By, (c) = Cosigned By      Initials Name Provider Type     Endy Ny OT Occupational Therapist                   Outcome Measures       Row Name 02/23/24 1627          How much help from another is currently needed...    Putting on and taking off regular lower body clothing? 4  -LS     Bathing (including washing, rinsing, and drying) 4  -LS     Toileting (which includes using toilet bed pan or urinal) 4  -LS     Putting on and taking off regular upper body clothing 4  -LS     Taking care of personal grooming (such as brushing teeth) 4  -LS     Eating meals 4  -LS     AM-PAC 6 Clicks Score (OT) 24  -LS       Row Name 02/23/24 0800          How much help from another person do you currently need...    Turning from your back to your side while in flat bed without using bedrails? 4  -BL     Moving from lying on back to sitting on the side of a flat bed without bedrails? 4  -BL     Moving to and from a bed to a chair (including a wheelchair)? 4  -BL     Standing up from a chair using your arms (e.g., wheelchair, bedside chair)? 4  -BL     Climbing 3-5 steps with a railing? 4  -BL     To walk in hospital room? 4  -BL     AM-PAC 6 Clicks Score (PT) 24  -BL     Highest Level of Mobility Goal 8 --> Walked 250 feet or more  -BL       Row Name 02/23/24 1627          Modified Stafford Scale    Modified Stafford Scale 0 - No Symptoms at all.  -LS       Row Name 02/23/24 1627          Functional Assessment    Outcome Measure Options AM-PAC 6 Clicks Daily Activity (OT);Modified Stafford  -LS               User Key  (r) = Recorded By, (t) = Taken By, (c) = Cosigned By       Initials Name Provider Type    Ophelia Irvin, RN Registered Nurse    Endy Fletcher OT Occupational Therapist                    Occupational Therapy Education       Title: PT OT SLP Therapies (Done)       Topic: Occupational Therapy (Done)       Point: ADL training (Done)       Description:   Instruct learner(s) on proper safety adaptation and remediation techniques during self care or transfers.   Instruct in proper use of assistive devices.                  Learning Progress Summary             Patient Acceptance, E,TB, VU by  at 2/23/2024 1628                                         User Key       Initials Effective Dates Name Provider Type Discipline     09/22/22 -  Endy Ny OT Occupational Therapist OT                  OT Recommendation and Plan  Therapy Frequency (OT): evaluation only  Plan of Care Review  Plan of Care Reviewed With: patient  Outcome Evaluation: 67 yo female adm 2/21/24 for acute onset of severe spinning sensation after awakening. CT head (-). MRI of head pending. Only significant PMH is RA. 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 She is independent for all mobility and drives at baseline. She works part time as a high abilities teacher. PT saw patient yesterday, and pt tested positive for BPPV. After treatment, symptoms have resolved as noted during today's OT evaluation. She has no ongoing deficits at this time. Pt has good BUE strength and ROM, she ambulates independently and retains her ADL independence. She has no more dizziness. OT will sign off.     Time Calculation:         Time Calculation- OT       Row Name 02/23/24 1628             Time Calculation- OT    OT Start Time 0832  -LS      OT Stop Time 0855  -      OT Time Calculation (min) 23 min  -LS      OT Received On 02/23/24  -         Untimed Charges    OT Eval/Re-eval Minutes 23  -         Total Minutes    Untimed Charges Total Minutes 23  -       Total Minutes 23  -                 User Key  (r) = Recorded By, (t) = Taken By, (c) = Cosigned By      Initials Name Provider Type     Endy Ny OT Occupational Therapist                  Therapy Charges for Today       Code Description Service Date Service Provider Modifiers Qty    [id number] HC OT EVAL LOW COMPLEXITY 4 2/23/2024 Endy Ny OT GO 1                 Endy Ny OT  2/23/2024

## 2024-02-23 NOTE — DISCHARGE SUMMARY
Date of Discharge:  2/23/2024    Discharge Diagnosis:   Near syncope  Abnormal LFTs  Neck pain  Hypothyroidism  Essential hypertension  Diabetes type 2  Rheumatoid arthritis    Presenting Problem/History of Present Illness  Active Hospital Problems    Diagnosis  POA    **Near syncope [R55]  Yes      Resolved Hospital Problems   No resolved problems to display.          Hospital Course    Patient is a 68 y.o. female presented to the emergency department 2/21 with complaints of near syncopal episode that morning.  She states she had an episode of heartburn in the morning in bed and when she rolled over a wave of blackness came over her vision she continued to feel lightheaded and weak all morning.  She recently had a cardiac workup last year with a normal echo and stress.  CT head was normal, CTA of carotids and head normal, MRI of the brain normal and MRI of the C-spine Severe left foraminal narrowing at C4-C5.  She is currently scheduled for surgery for the narrowing of C4 and C5.  Patient worked with physical therapy yesterday and improved with resolution after Epley maneuver.  She has had no further issues or complaints and is ready for discharge.  It was noted on this admission that her LFTs were elevated and platelets a little bit on the lower side.  She had hepatitis panel that was negative, CTA negative and liver ultrasound that was negative.  Her statin has been stopped and she has been instructed on not to take Tylenol or drink alcohol.  Recommend follow-up labs to monitor closely.  She already has a follow-up appoint with PCP    Procedures Performed         Consults:   Consults       Date and Time Order Name Status Description    2/22/2024 10:47 AM Inpatient Neurology Consult General Completed     2/21/2024 10:05 AM Family Medicine Consult              Pertinent Test Results:    Lab Results (most recent)       Procedure Component Value Units Date/Time    Mitochondrial Antibodies, M2 [[id number]] Collected:  02/22/24 1127    Specimen: Blood Updated: 02/23/24 1412     Mitochondrial Ab <20.0 Units      Comment:                                 Negative    0.0 - 20.0                                  Equivocal  20.1 - 24.9                                  Positive         >24.9  Mitochondrial (M2) Antibodies are found in 90-96% of  patients with primary biliary cirrhosis.       Narrative:      Performed at:  [facility]  [id number]  : Devon Pritchett PhD, Phone:  [phone]    POC Glucose Once [[id number]]  (Abnormal) Collected: 02/23/24 0728    Specimen: Blood Updated: 02/23/24 0730     Glucose 115 mg/dL      Comment: Serial Number: [id number]:  [id number]       Comprehensive Metabolic Panel [[id number]]  (Abnormal) Collected: 02/23/24 0125    Specimen: Blood Updated: 02/23/24 0325     Glucose 108 mg/dL      BUN 21 mg/dL      Creatinine 0.93 mg/dL      Sodium 143 mmol/L      Potassium 3.4 mmol/L      Chloride 104 mmol/L      CO2 30.0 mmol/L      Calcium 8.7 mg/dL      Total Protein 5.5 g/dL      Albumin 3.5 g/dL      ALT (SGPT) 288 U/L      AST (SGOT) 202 U/L      Alkaline Phosphatase 77 U/L      Total Bilirubin 0.6 mg/dL      Globulin 2.0 gm/dL      A/G Ratio 1.8 g/dL      BUN/Creatinine Ratio 22.6     Anion Gap 9.0 mmol/L      eGFR 67.5 mL/min/1.73     Narrative:      GFR Normal >60  Chronic Kidney Disease <60  Kidney Failure <15      CBC & Differential [[id number]]  (Abnormal) Collected: 02/23/24 0125    Specimen: Blood Updated: 02/23/24 0306    Narrative:      The following orders were created for panel order CBC & Differential.  Procedure                               Abnormality         Status                     ---------                               -----------         ------                     CBC Auto Differential[[id number]]        Abnormal            Final result                 Please view results for these tests on the individual orders.    CBC Auto  Differential [[id number]]  (Abnormal) Collected: 02/23/24 0125    Specimen: Blood Updated: 02/23/24 0306     WBC 4.60 10*3/mm3      RBC 4.14 10*6/mm3      Hemoglobin 12.2 g/dL      Hematocrit 37.2 %      MCV 89.8 fL      MCH 29.5 pg      MCHC 32.9 g/dL      RDW 14.4 %      RDW-SD 47.3 fl      MPV 10.9 fL      Platelets 124 10*3/mm3      Neutrophil % 59.3 %      Lymphocyte % 23.9 %      Monocyte % 14.4 %      Eosinophil % 1.8 %      Basophil % 0.6 %      Neutrophils, Absolute 2.70 10*3/mm3      Lymphocytes, Absolute 1.10 10*3/mm3      Monocytes, Absolute 0.70 10*3/mm3      Eosinophils, Absolute 0.10 10*3/mm3      Basophils, Absolute 0.00 10*3/mm3      nRBC 0.1 /100 WBC     EBV Antibody Profile [[id number]] Collected: 02/23/24 0125    Specimen: Blood Updated: 02/23/24 0255    Cytomegalovirus Antibody, IgG [[id number]] Collected: 02/23/24 0125    Specimen: Blood Updated: 02/23/24 0255    Cytomegalovirus Antibody, IgM [[id number]] Collected: 02/23/24 0125    Specimen: Blood Updated: 02/23/24 0255    Vitamin B12 [[id number]]  (Abnormal) Collected: 02/22/24 1330    Specimen: Blood Updated: 02/22/24 1646     Vitamin B-12 1,176 pg/mL     Narrative:      Results may be falsely increased if patient taking Biotin.      Folate [[id number]]  (Normal) Collected: 02/22/24 1330    Specimen: Blood Updated: 02/22/24 1646     Folate 12.00 ng/mL     Narrative:      Results may be falsely increased if patient taking Biotin.      CEA [[id number]] Collected: 02/22/24 1127    Specimen: Blood Updated: 02/22/24 1635     CEA 2.92 ng/mL     Narrative:      CEA Reference Range:    Non Smokers:   Less than 3 ng/mL  Smokers:       Less than 5 ng/mL  Results may be falsely decreased if patient taking Biotin.    Testing Method: [employer] Electrochemiluminescence Immunoassay(ECLIA)  Values obtained with different assay methods or kits cannot be used interchangeably.    Hemoglobin A1c [[id number]]  (Abnormal) Collected: 02/22/24 0257    Specimen:  Blood Updated: 02/22/24 1405     Hemoglobin A1C 6.30 %     Hepatitis Panel, Acute [[id number]]  (Normal) Collected: 02/22/24 1127    Specimen: Blood Updated: 02/22/24 1213     Hepatitis B Surface Ag Non-Reactive     Hep A IgM Non-Reactive     Hep B C IgM Non-Reactive     Hepatitis C Ab Non-Reactive    Narrative:      Results may be falsely decreased if patient taking Biotin.     Potassium [[id number]]  (Normal) Collected: 02/22/24 1127    Specimen: Blood Updated: 02/22/24 1209     Potassium 3.9 mmol/L     Ammonia [[id number]]  (Normal) Collected: 02/22/24 1126    Specimen: Blood Updated: 02/22/24 1153     Ammonia 29 umol/L     KATHRINE by IFA, Reflex 9-biomarkers profile [[id number]] Collected: 02/22/24 1127    Specimen: Blood Updated: 02/22/24 1132    C-reactive Protein [[id number]]  (Normal) Collected: 02/22/24 0257    Specimen: Blood Updated: 02/22/24 1107     C-Reactive Protein 0.45 mg/dL     Basic Metabolic Panel [[id number]]  (Abnormal) Collected: 02/22/24 0257    Specimen: Blood Updated: 02/22/24 0338     Glucose 107 mg/dL      BUN 23 mg/dL      Creatinine 0.99 mg/dL      Sodium 144 mmol/L      Potassium 3.6 mmol/L      Chloride 105 mmol/L      CO2 29.0 mmol/L      Calcium 8.5 mg/dL      BUN/Creatinine Ratio 23.2     Anion Gap 10.0 mmol/L      eGFR 62.6 mL/min/1.73     Narrative:      GFR Normal >60  Chronic Kidney Disease <60  Kidney Failure <15      CBC & Differential [[id number]]  (Abnormal) Collected: 02/22/24 0257    Specimen: Blood Updated: 02/22/24 0312    Narrative:      The following orders were created for panel order CBC & Differential.  Procedure                               Abnormality         Status                     ---------                               -----------         ------                     CBC Auto Differential[[id number]]        Abnormal            Final result                 Please view results for these tests on the individual orders.    CBC Auto Differential [[id number]]  (Abnormal)  Collected: 02/22/24 0257    Specimen: Blood Updated: 02/22/24 0312     WBC 4.20 10*3/mm3      RBC 4.11 10*6/mm3      Hemoglobin 12.1 g/dL      Hematocrit 36.9 %      MCV 89.6 fL      MCH 29.4 pg      MCHC 32.7 g/dL      RDW 14.5 %      RDW-SD 48.1 fl      MPV 9.6 fL      Platelets 117 10*3/mm3      Neutrophil % 57.9 %      Lymphocyte % 23.6 %      Monocyte % 15.9 %      Eosinophil % 1.9 %      Basophil % 0.7 %      Neutrophils, Absolute 2.40 10*3/mm3      Lymphocytes, Absolute 1.00 10*3/mm3      Monocytes, Absolute 0.70 10*3/mm3      Eosinophils, Absolute 0.10 10*3/mm3      Basophils, Absolute 0.00 10*3/mm3      nRBC 0.1 /100 WBC     High Sensitivity Troponin T 2Hr [[id number]]  (Normal) Collected: 02/21/24 1353    Specimen: Blood Updated: 02/21/24 1927     HS Troponin T 13 ng/L      Troponin T Delta 0 ng/L     Narrative:      High Sensitive Troponin T Reference Range:  <14.0 ng/L- Negative Female for AMI  <22.0 ng/L- Negative Male for AMI  >=14 - Abnormal Female indicating possible myocardial injury.  >=22 - Abnormal Male indicating possible myocardial injury.   Clinicians would have to utilize clinical acumen, EKG, Troponin, and serial changes to determine if it is an Acute Myocardial Infarction or myocardial injury due to an underlying chronic condition.         Hepatic Function Panel [[id number]]  (Abnormal) Collected: 02/21/24 1353    Specimen: Blood Updated: 02/21/24 1927     Total Protein 5.6 g/dL      Albumin 3.6 g/dL      ALT (SGPT) 332 U/L      AST (SGOT) 228 U/L      Alkaline Phosphatase 78 U/L      Total Bilirubin 0.9 mg/dL      Bilirubin, Direct 0.4 mg/dL      Bilirubin, Indirect 0.5 mg/dL     T4, Free [[id number]]  (Abnormal) Collected: 02/21/24 0901    Specimen: Blood Updated: 02/21/24 1006     Free T4 2.45 ng/dL     Narrative:      Results may be falsely increased if patient taking Biotin.      High Sensitivity Troponin T [[id number]]  (Normal) Collected: 02/21/24 0901    Specimen: Blood Updated:  02/21/24 0936     HS Troponin T 13 ng/L     Narrative:      High Sensitive Troponin T Reference Range:  <14.0 ng/L- Negative Female for AMI  <22.0 ng/L- Negative Male for AMI  >=14 - Abnormal Female indicating possible myocardial injury.  >=22 - Abnormal Male indicating possible myocardial injury.   Clinicians would have to utilize clinical acumen, EKG, Troponin, and serial changes to determine if it is an Acute Myocardial Infarction or myocardial injury due to an underlying chronic condition.         TSH [[id number]]  (Abnormal) Collected: 02/21/24 0901    Specimen: Blood Updated: 02/21/24 0936     TSH 0.012 uIU/mL     Comprehensive Metabolic Panel [[id number]]  (Abnormal) Collected: 02/21/24 0901    Specimen: Blood Updated: 02/21/24 0933     Glucose 144 mg/dL      BUN 24 mg/dL      Creatinine 0.87 mg/dL      Sodium 142 mmol/L      Potassium 3.1 mmol/L      Comment: Slight hemolysis detected by analyzer. Result may be falsely elevated.        Chloride 104 mmol/L      CO2 28.0 mmol/L      Calcium 9.4 mg/dL      Total Protein 6.2 g/dL      Albumin 4.0 g/dL      ALT (SGPT) 377 U/L      AST (SGOT) 252 U/L      Comment: Slight hemolysis detected by analyzer. Result may be falsely elevated.        Alkaline Phosphatase 90 U/L      Total Bilirubin 1.0 mg/dL      Globulin 2.2 gm/dL      A/G Ratio 1.8 g/dL      BUN/Creatinine Ratio 27.6     Anion Gap 10.0 mmol/L      eGFR 73.1 mL/min/1.73     Narrative:      GFR Normal >60  Chronic Kidney Disease <60  Kidney Failure <15      Carbon Monoxide, Blood [[id number]]  (Normal) Collected: 02/21/24 0901    Specimen: Blood Updated: 02/21/24 0905     Carbon Monoxide, Blood 1.4 %              Results for orders placed during the hospital encounter of 07/05/23    Adult Transthoracic Echo Complete W/ Cont if Necessary Per Protocol    Interpretation Summary    Left ventricular ejection fraction appears to be 66 - 70%.    Estimated right ventricular systolic pressure from tricuspid  regurgitation is normal (<35 mmHg).       Condition on Discharge:  Stable    Vital Signs  Temp:  [97.7 °F (36.5 °C)-98.5 °F (36.9 °C)] 98.1 °F (36.7 °C)  Heart Rate:  [50-58] 50  Resp:  [12-20] 14  BP: (109-164)/(54-91) 144/59      Physical Exam  Vitals reviewed.   HENT:      Head: Normocephalic and atraumatic.      Right Ear: External ear normal.      Left Ear: External ear normal.      Nose: Nose normal.      Mouth/Throat:      Mouth: Mucous membranes are moist.   Eyes:      General:         Right eye: No discharge.         Left eye: No discharge.   Cardiovascular:      Rate and Rhythm: Normal rate and regular rhythm.      Pulses: Normal pulses.      Heart sounds: Normal heart sounds.   Pulmonary:      Effort: Pulmonary effort is normal.      Breath sounds: Normal breath sounds.   Abdominal:      General: Bowel sounds are normal.      Palpations: Abdomen is soft.   Musculoskeletal:         General: Normal range of motion.      Cervical back: Normal range of motion.   Skin:     General: Skin is warm and dry.   Neurological:      Mental Status: She is alert and oriented to person, place, and time.   Psychiatric:         Behavior: Behavior normal.              Discharge Disposition  Home or Self Care    Discharge Medications     Discharge Medications        Continue These Medications        Instructions Start Date   atorvastatin 20 MG tablet  Commonly known as: LIPITOR   20 mg, Oral, Daily      Farxiga 10 MG tablet  Generic drug: dapagliflozin Propanediol   1 tablet, Oral, Daily      hydrALAZINE 50 MG tablet  Commonly known as: APRESOLINE   50 mg, Oral, 2 Times Daily      hydroCHLOROthiazide 25 MG tablet   25 mg, Oral, Daily      levothyroxine 137 MCG tablet  Commonly known as: SYNTHROID, LEVOTHROID   137 mcg, Oral, Take As Directed, 6 days a week, nothing on Sunday      losartan 100 MG tablet  Commonly known as: COZAAR   100 mg, Oral, Every Evening      metoprolol succinate XL 25 MG 24 hr tablet  Commonly known as:  TOPROL-XL   25 mg, Oral, 2 Times Daily      potassium chloride 20 MEQ CR tablet  Commonly known as: K-DUR,KLOR-CON   20 mEq, Oral, 2 Times Daily               Discharge Diet:   Diet Instructions       Diet: Regular/House Diet; Regular Texture (IDDSI 7); Thin (IDDSI 0)      Discharge Diet: Regular/House Diet    Texture: Regular Texture (IDDSI 7)    Fluid Consistency: Thin (IDDSI 0)            Activity at Discharge:   Activity Instructions       Gradually Increase Activity Until at Pre-Hospitalization Level              Follow-up Appointments  No future appointments.  Additional Instructions for the Follow-ups that You Need to Schedule       Discharge Follow-up with PCP   As directed       Currently Documented PCP:    Ariel Rider MD    PCP Phone Number:    [phone]     Follow Up Details: Keep current appointment                Test Results Pending at Discharge  Pending Labs       Order Current Status    KATHRINE by IFA, Reflex 9-biomarkers profile In process    Cytomegalovirus Antibody, IgG In process    Cytomegalovirus Antibody, IgM In process    EBV Antibody Profile In process             WENDY Ramos  02/23/24  14:21 EST    Time: Discharge 25 min

## 2024-02-23 NOTE — PROGRESS NOTES
"     LOS: 0 days   Patient Care Team:  Ariel Rider MD as PCP - General (Family Medicine)  Sam Chao MD as Consulting Physician (Cardiology)    Subjective     Interval History: No episodes of dizziness or visual disturbance overnight.      Patient Complaints: Concerned about \"spells\" of vision changes and abnormal LFT's    History taken from: patient    Review of Systems   Constitutional:  Negative for activity change, appetite change, chills, diaphoresis, fatigue and fever.   HENT:  Negative for facial swelling.    Eyes:  Negative for visual disturbance.   Respiratory:  Negative for cough and shortness of breath.    Cardiovascular:  Negative for chest pain, palpitations and leg swelling.   Gastrointestinal:  Negative for abdominal pain, constipation, diarrhea, nausea and vomiting.   Genitourinary:  Negative for urgency.   Musculoskeletal:  Negative for arthralgias, back pain and gait problem.   Skin:  Negative for rash and wound.   Neurological:  Negative for weakness.   Psychiatric/Behavioral:  Negative for confusion.            Objective     Vital Signs  Temp:  [97.7 °F (36.5 °C)-97.9 °F (36.6 °C)] 97.9 °F (36.6 °C)  Heart Rate:  [55-63] 58  Resp:  [14-17] 17  BP: (108-148)/(48-74) 148/74    Physical Exam:     General Appearance:    Alert, cooperative, in no acute distress,   Head:    Normocephalic, without obvious abnormality, atraumatic   Eyes:            Lids and lashes normal, conjunctivae and sclerae normal, no   icterus, no pallor, corneas clear, PERRLA   Ears:    Ears appear intact with no abnormalities noted   Throat:   No oral lesions, no thrush, oral mucosa moist   Neck:   No adenopathy, supple, trachea midline, no thyromegaly, no   carotid bruit, no JVD   Lungs:     Clear to auscultation,respirations regular, even and                  unlabored    Heart:    Regular rhythm and normal rate, normal S1 and S2, no            murmur, no gallop, no rub, no click   Chest Wall:    No abnormalities " observed   Abdomen:     Normal bowel sounds, no masses, no organomegaly, soft        Non-tender non-distended, no guarding,   Extremities:   Moves all extremities well, no edema, no cyanosis, no             Redness   Pulses:   Pulses palpable and equal bilaterally   Skin:   No bleeding, bruising or rash   Lymph nodes:   No palpable adenopathy   Neurologic:   Cranial nerves 2 - 12 grossly intact, sensation intact, DTR       present and equal bilaterally        Results Review:    Lab Results (last 24 hours)       Procedure Component Value Units Date/Time    Vitamin B12 [[id number]]  (Abnormal) Collected: 02/22/24 1330    Specimen: Blood Updated: 02/22/24 1646     Vitamin B-12 1,176 pg/mL     Narrative:      Results may be falsely increased if patient taking Biotin.      Folate [[id number]]  (Normal) Collected: 02/22/24 1330    Specimen: Blood Updated: 02/22/24 1646     Folate 12.00 ng/mL     Narrative:      Results may be falsely increased if patient taking Biotin.      CEA [[id number]] Collected: 02/22/24 1127    Specimen: Blood Updated: 02/22/24 1635     CEA 2.92 ng/mL     Narrative:      CEA Reference Range:    Non Smokers:   Less than 3 ng/mL  Smokers:       Less than 5 ng/mL  Results may be falsely decreased if patient taking Biotin.    Testing Method: [employer] Electrochemiluminescence Immunoassay(ECLIA)  Values obtained with different assay methods or kits cannot be used interchangeably.    Hemoglobin A1c [[id number]]  (Abnormal) Collected: 02/22/24 0257    Specimen: Blood Updated: 02/22/24 1405     Hemoglobin A1C 6.30 %     Hepatitis Panel, Acute [[id number]]  (Normal) Collected: 02/22/24 1127    Specimen: Blood Updated: 02/22/24 1213     Hepatitis B Surface Ag Non-Reactive     Hep A IgM Non-Reactive     Hep B C IgM Non-Reactive     Hepatitis C Ab Non-Reactive    Narrative:      Results may be falsely decreased if patient taking Biotin.     Potassium [[id number]]  (Normal) Collected: 02/22/24 1127     Specimen: Blood Updated: 02/22/24 1209     Potassium 3.9 mmol/L     Ammonia [[id number]]  (Normal) Collected: 02/22/24 1126    Specimen: Blood Updated: 02/22/24 1153     Ammonia 29 umol/L     Mitochondrial Antibodies, M2 [[id number]] Collected: 02/22/24 1127    Specimen: Blood Updated: 02/22/24 1132    KATHRINE by IFA, Reflex 9-biomarkers profile [[id number]] Collected: 02/22/24 1127    Specimen: Blood Updated: 02/22/24 1132    C-reactive Protein [[id number]]  (Normal) Collected: 02/22/24 0257    Specimen: Blood Updated: 02/22/24 1107     C-Reactive Protein 0.45 mg/dL     Basic Metabolic Panel [[id number]]  (Abnormal) Collected: 02/22/24 0257    Specimen: Blood Updated: 02/22/24 0338     Glucose 107 mg/dL      BUN 23 mg/dL      Creatinine 0.99 mg/dL      Sodium 144 mmol/L      Potassium 3.6 mmol/L      Chloride 105 mmol/L      CO2 29.0 mmol/L      Calcium 8.5 mg/dL      BUN/Creatinine Ratio 23.2     Anion Gap 10.0 mmol/L      eGFR 62.6 mL/min/1.73     Narrative:      GFR Normal >60  Chronic Kidney Disease <60  Kidney Failure <15      CBC & Differential [[id number]]  (Abnormal) Collected: 02/22/24 0257    Specimen: Blood Updated: 02/22/24 0312    Narrative:      The following orders were created for panel order CBC & Differential.  Procedure                               Abnormality         Status                     ---------                               -----------         ------                     CBC Auto Differential[[id number]]        Abnormal            Final result                 Please view results for these tests on the individual orders.    CBC Auto Differential [[id number]]  (Abnormal) Collected: 02/22/24 0257    Specimen: Blood Updated: 02/22/24 0312     WBC 4.20 10*3/mm3      RBC 4.11 10*6/mm3      Hemoglobin 12.1 g/dL      Hematocrit 36.9 %      MCV 89.6 fL      MCH 29.4 pg      MCHC 32.7 g/dL      RDW 14.5 %      RDW-SD 48.1 fl      MPV 9.6 fL      Platelets 117 10*3/mm3      Neutrophil % 57.9 %      Lymphocyte  % 23.6 %      Monocyte % 15.9 %      Eosinophil % 1.9 %      Basophil % 0.7 %      Neutrophils, Absolute 2.40 10*3/mm3      Lymphocytes, Absolute 1.00 10*3/mm3      Monocytes, Absolute 0.70 10*3/mm3      Eosinophils, Absolute 0.10 10*3/mm3      Basophils, Absolute 0.00 10*3/mm3      nRBC 0.1 /100 WBC     High Sensitivity Troponin T 2Hr [[id number]]  (Normal) Collected: 02/21/24 1353    Specimen: Blood Updated: 02/21/24 1927     HS Troponin T 13 ng/L      Troponin T Delta 0 ng/L     Narrative:      High Sensitive Troponin T Reference Range:  <14.0 ng/L- Negative Female for AMI  <22.0 ng/L- Negative Male for AMI  >=14 - Abnormal Female indicating possible myocardial injury.  >=22 - Abnormal Male indicating possible myocardial injury.   Clinicians would have to utilize clinical acumen, EKG, Troponin, and serial changes to determine if it is an Acute Myocardial Infarction or myocardial injury due to an underlying chronic condition.         Hepatic Function Panel [[id number]]  (Abnormal) Collected: 02/21/24 1353    Specimen: Blood Updated: 02/21/24 1927     Total Protein 5.6 g/dL      Albumin 3.6 g/dL      ALT (SGPT) 332 U/L      AST (SGOT) 228 U/L      Alkaline Phosphatase 78 U/L      Total Bilirubin 0.9 mg/dL      Bilirubin, Direct 0.4 mg/dL      Bilirubin, Indirect 0.5 mg/dL              Imaging Results (Last 24 Hours)       Procedure Component Value Units Date/Time    MRI Cervical Spine Without Contrast [[id number]] Resulted: 02/22/24 1744     Updated: 02/22/24 1820    MRI Brain Without Contrast [[id number]] Resulted: 02/22/24 1743     Updated: 02/22/24 1819    US Liver [[id number]] Collected: 02/22/24 1412     Updated: 02/22/24 1417    Narrative:      US LIVER    Date of Exam: 2/22/2024 1:39 PM EST    Indication: abnormal lft's.    Comparison: CT abdomen pelvis 11/13/2020.    Technique: Grayscale and color Doppler ultrasound evaluation of the liver was performed.    Findings:  Echogenicity of the liver is within normal  limits. The imaged portal vein and hepatic veins demonstrate normal direction of flow and normal waveform on spectral imaging. The common bile duct measures 6 mm. Cholecystectomy. No free fluid is visualized.      Impression:      Impression:  Unremarkable sonographic appearance of the liver. No evidence of abnormal biliary ductal dilatation in this patient status post cholecystectomy.      Electronically Signed: Dany Galindo MD    2/22/2024 2:15 PM EST    Workstation ID: [id number]    CT Angiogram Head [[id number]] Collected: 02/22/24 1351     Updated: 02/22/24 1400    Narrative:      CT ANGIOGRAM CAROTIDS, CT ANGIOGRAM HEAD    Date of Exam: 2/22/2024 12:03 PM EST    Indication: recurrent headaches and near-syncope.    Comparison: None available.    Technique: CTA of the head and neck was performed before and after the uneventful intravenous administration of iodinated contrast. Reconstructed coronal and sagittal images were also obtained. In addition, a 3-D volume rendered image was created for   interpretation. Automated exposure control and iterative reconstruction methods were used.      Findings:  The lung apices clear. Evaluation of the aerodigestive tract structures demonstrates no evidence of focal mass or other luminal abnormality. There is no distinct pathologic cervical lymphadenopathy. The osseous structures demonstrate multilevel   spondylosis, without evidence of acute fracture or aggressive osseous lesion. Patent aortic arch with three-vessel branching. The visualized subclavian arteries are patent bilaterally. On the right, there is no significant atherosclerotic narrowing of   the ICA origin, 0% stenosis by NASCET criteria. Similar 0% narrowing is present on the left. The vertebral arteries are normal in course and caliber bilaterally. Intracranially, the carotid siphons demonstrate no significant atherosclerotic narrowing.   The anterior cerebral arteries are normal in course and caliber. The right  middle cerebral artery demonstrates no evidence of flow-limiting stenosis, large vessel occlusion or aneurysm. The left middle cerebral artery is similarly normal in course and   caliber. The vertebrobasilar system is patent. The posterior cerebral arteries demonstrate some multifocal mild to moderate narrowing most notable involving the right P1-P2 junction. Fetal configuration is noted on the left.      Impression:      Impression:  Essentially normal CT angiogram of the head and neck as above. No significant atherosclerotic change is present in the neck. Intracranially there is some mild to moderate atherosclerotic narrowing of the right PCA proximally. There is otherwise no   evidence of high-grade flow-limiting stenosis, large vessel occlusion or aneurysm.        Electronically Signed: Elian Solano MD    2/22/2024 1:58 PM EST    Workstation ID: [id number]    CT Angiogram Carotids [[id number]] Collected: 02/22/24 1351     Updated: 02/22/24 1400    Narrative:      CT ANGIOGRAM CAROTIDS, CT ANGIOGRAM HEAD    Date of Exam: 2/22/2024 12:03 PM EST    Indication: recurrent headaches and near-syncope.    Comparison: None available.    Technique: CTA of the head and neck was performed before and after the uneventful intravenous administration of iodinated contrast. Reconstructed coronal and sagittal images were also obtained. In addition, a 3-D volume rendered image was created for   interpretation. Automated exposure control and iterative reconstruction methods were used.      Findings:  The lung apices clear. Evaluation of the aerodigestive tract structures demonstrates no evidence of focal mass or other luminal abnormality. There is no distinct pathologic cervical lymphadenopathy. The osseous structures demonstrate multilevel   spondylosis, without evidence of acute fracture or aggressive osseous lesion. Patent aortic arch with three-vessel branching. The visualized subclavian arteries are patent bilaterally. On the  right, there is no significant atherosclerotic narrowing of   the ICA origin, 0% stenosis by NASCET criteria. Similar 0% narrowing is present on the left. The vertebral arteries are normal in course and caliber bilaterally. Intracranially, the carotid siphons demonstrate no significant atherosclerotic narrowing.   The anterior cerebral arteries are normal in course and caliber. The right middle cerebral artery demonstrates no evidence of flow-limiting stenosis, large vessel occlusion or aneurysm. The left middle cerebral artery is similarly normal in course and   caliber. The vertebrobasilar system is patent. The posterior cerebral arteries demonstrate some multifocal mild to moderate narrowing most notable involving the right P1-P2 junction. Fetal configuration is noted on the left.      Impression:      Impression:  Essentially normal CT angiogram of the head and neck as above. No significant atherosclerotic change is present in the neck. Intracranially there is some mild to moderate atherosclerotic narrowing of the right PCA proximally. There is otherwise no   evidence of high-grade flow-limiting stenosis, large vessel occlusion or aneurysm.        Electronically Signed: Elian Solano MD    2/22/2024 1:58 PM EST    Workstation ID: [id number]                 I reviewed the patient's new clinical results.    Medication Review:   Scheduled Meds:atorvastatin, 20 mg, Oral, Daily  empagliflozin, 10 mg, Oral, Daily  hydrALAZINE, 50 mg, Oral, BID  hydroCHLOROthiazide, 25 mg, Oral, Daily  levothyroxine, 125 mcg, Oral, Q AM  losartan, 100 mg, Oral, Q PM  metoprolol succinate XL, 25 mg, Oral, BID  pantoprazole, 40 mg, Intravenous, Q AM  potassium chloride ER, 20 mEq, Oral, BID  sodium chloride, 10 mL, Intravenous, Q12H      Continuous Infusions:   PRN Meds:.  acetaminophen    Calcium Replacement - Follow Nurse / BPA Driven Protocol    hydrALAZINE    Magnesium Standard Dose Replacement - Follow Nurse / BPA Driven Protocol     melatonin    ondansetron    Phosphorus Replacement - Follow Nurse / BPA Driven Protocol    Potassium Replacement - Follow Nurse / BPA Driven Protocol    [COMPLETED] Insert Peripheral IV **AND** sodium chloride    sodium chloride    sodium chloride     Assessment & Plan       Near syncope  - had recent cardiac workup; concerning for possible occipital/posterior circulation problem given bilateral vision loss.  CTA-head and neck ordered.  MRI brain ordered. Will ask neurologist to evaluate.    Abnormal LFT's - us liver, check acute hepatitis panel, KATHRINE, AMA, EBV, CMV titres.  D/c Tylenol and statin.    Neck pain - MRI - c spine    Hypothyroidism - reducing levothyroxine dose    Essential hypertension - hydralazine, HCTZ, losartan, metoprolol    DM-II - Farxiga; A1C is less than 7.    RA - resume meds after discharge.    Plan for disposition:Home at discharge    Altagracia King MD  02/22/24  19:13 EST

## 2024-02-23 NOTE — PROGRESS NOTES
Patient doing very well    She had Epley maneuver done and she dramatically and immediately improved    She walked today    Her MRI brain was okay    CTA did not show any VBI    Her MRI cervical spine does have multiple level disc disease but no cord signal abnormality and she knew about it    So per neurology I will observe    May use Klonopin as needed    I have discussed it with Dr. King    Call me if needed    Follow-up with neurology or ENT if needed in future

## 2024-02-23 NOTE — PLAN OF CARE
Goal Outcome Evaluation:  Plan of Care Reviewed With: patient        Progress: improving  Outcome Evaluation: Patient has denied complaints of dizziness during shift. Also denies pain. Patient continues to ambulate with standby assistance, and continues to void without difficulty. Patient remains A&Ox4. VSS throughout shift.

## 2024-02-24 LAB
CMV IGG SERPL IA-ACNC: 2.1 U/ML (ref 0–0.59)
CMV IGM SERPL IA-ACNC: <30 AU/ML (ref 0–29.9)

## 2024-02-26 LAB
EBV NA IGG SER IA-ACNC: 441 U/ML (ref 0–17.9)
EBV VCA IGG SER IA-ACNC: 428 U/ML (ref 0–17.9)
EBV VCA IGM SER IA-ACNC: <36 U/ML (ref 0–35.9)
SERVICE CMNT-IMP: ABNORMAL

## 2024-02-27 ENCOUNTER — TELEPHONE (OUTPATIENT)
Dept: CARDIOLOGY | Facility: CLINIC | Age: 69
End: 2024-02-27
Payer: MEDICARE

## 2024-02-27 LAB
ANA SER QL IF: POSITIVE
ANA SPECKLED TITR SER: ABNORMAL {TITER}
CENTROMERE B AB SER-ACNC: <0.2 AI (ref 0–0.9)
CHROMATIN AB SERPL-ACNC: <0.2 AI (ref 0–0.9)
DSDNA AB SER-ACNC: <1 IU/ML (ref 0–9)
ENA JO1 AB SER-ACNC: <0.2 AI (ref 0–0.9)
ENA RNP AB SER-ACNC: 0.8 AI (ref 0–0.9)
ENA SCL70 AB SER-ACNC: <0.2 AI (ref 0–0.9)
ENA SM AB SER-ACNC: <0.2 AI (ref 0–0.9)
ENA SS-A AB SER-ACNC: <0.2 AI (ref 0–0.9)
ENA SS-B AB SER-ACNC: <0.2 AI (ref 0–0.9)
LABORATORY COMMENT REPORT: ABNORMAL
Lab: ABNORMAL
Lab: ABNORMAL

## 2024-02-27 NOTE — TELEPHONE ENCOUNTER
FACILITY: [facility]   ROD Plunkett  PHONE:  FAX: [phone]  PROCEDURE: PCF C4-6 j ACDF C5-6  SCHEDULED: 03/06/24  MEDS TO HOLD: none

## 2024-04-01 ENCOUNTER — LAB (OUTPATIENT)
Dept: LAB | Facility: HOSPITAL | Age: 69
End: 2024-04-01
Payer: MEDICARE

## 2024-04-01 ENCOUNTER — TRANSCRIBE ORDERS (OUTPATIENT)
Dept: ADMINISTRATIVE | Facility: HOSPITAL | Age: 69
End: 2024-04-01
Payer: MEDICARE

## 2024-04-01 DIAGNOSIS — Z01.818 OTHER SPECIFIED PRE-OPERATIVE EXAMINATION: Primary | ICD-10-CM

## 2024-04-01 DIAGNOSIS — Z01.818 OTHER SPECIFIED PRE-OPERATIVE EXAMINATION: ICD-10-CM

## 2024-04-01 LAB
ANION GAP SERPL CALCULATED.3IONS-SCNC: 13 MMOL/L (ref 5–15)
BASOPHILS # BLD AUTO: 0.05 10*3/MM3 (ref 0–0.2)
BASOPHILS NFR BLD AUTO: 0.8 % (ref 0–1.5)
BUN SERPL-MCNC: 25 MG/DL (ref 8–23)
BUN/CREAT SERPL: 25.3 (ref 7–25)
CALCIUM SPEC-SCNC: 8.6 MG/DL (ref 8.6–10.5)
CHLORIDE SERPL-SCNC: 102 MMOL/L (ref 98–107)
CO2 SERPL-SCNC: 27 MMOL/L (ref 22–29)
CREAT SERPL-MCNC: 0.99 MG/DL (ref 0.57–1)
DEPRECATED RDW RBC AUTO: 42.4 FL (ref 37–54)
EGFRCR SERPLBLD CKD-EPI 2021: 62.6 ML/MIN/1.73
EOSINOPHIL # BLD AUTO: 0.12 10*3/MM3 (ref 0–0.4)
EOSINOPHIL NFR BLD AUTO: 2 % (ref 0.3–6.2)
ERYTHROCYTE [DISTWIDTH] IN BLOOD BY AUTOMATED COUNT: 12.6 % (ref 12.3–15.4)
GLUCOSE SERPL-MCNC: 122 MG/DL (ref 65–99)
HCT VFR BLD AUTO: 42.9 % (ref 34–46.6)
HGB BLD-MCNC: 14.4 G/DL (ref 12–15.9)
IMM GRANULOCYTES # BLD AUTO: 0.03 10*3/MM3 (ref 0–0.05)
IMM GRANULOCYTES NFR BLD AUTO: 0.5 % (ref 0–0.5)
LYMPHOCYTES # BLD AUTO: 1.58 10*3/MM3 (ref 0.7–3.1)
LYMPHOCYTES NFR BLD AUTO: 26.2 % (ref 19.6–45.3)
MCH RBC QN AUTO: 30.6 PG (ref 26.6–33)
MCHC RBC AUTO-ENTMCNC: 33.6 G/DL (ref 31.5–35.7)
MCV RBC AUTO: 91.3 FL (ref 79–97)
MONOCYTES # BLD AUTO: 0.7 10*3/MM3 (ref 0.1–0.9)
MONOCYTES NFR BLD AUTO: 11.6 % (ref 5–12)
NEUTROPHILS NFR BLD AUTO: 3.55 10*3/MM3 (ref 1.7–7)
NEUTROPHILS NFR BLD AUTO: 58.9 % (ref 42.7–76)
NRBC BLD AUTO-RTO: 0 /100 WBC (ref 0–0.2)
PLATELET # BLD AUTO: 153 10*3/MM3 (ref 140–450)
PMV BLD AUTO: 12 FL (ref 6–12)
POTASSIUM SERPL-SCNC: 3.2 MMOL/L (ref 3.5–5.2)
RBC # BLD AUTO: 4.7 10*6/MM3 (ref 3.77–5.28)
SODIUM SERPL-SCNC: 142 MMOL/L (ref 136–145)
WBC NRBC COR # BLD AUTO: 6.03 10*3/MM3 (ref 3.4–10.8)

## 2024-04-01 PROCEDURE — 85025 COMPLETE CBC W/AUTO DIFF WBC: CPT

## 2024-04-01 PROCEDURE — 80048 BASIC METABOLIC PNL TOTAL CA: CPT

## 2024-04-01 PROCEDURE — 36415 COLL VENOUS BLD VENIPUNCTURE: CPT

## 2024-09-15 ENCOUNTER — HOSPITAL ENCOUNTER (INPATIENT)
Facility: HOSPITAL | Age: 69
LOS: 1 days | Discharge: HOME | End: 2024-09-17
Attending: EMERGENCY MEDICINE | Admitting: FAMILY MEDICINE
Payer: MEDICARE

## 2024-09-15 ENCOUNTER — APPOINTMENT (OUTPATIENT)
Dept: GENERAL RADIOLOGY | Facility: HOSPITAL | Age: 69
End: 2024-09-15
Payer: MEDICARE

## 2024-09-15 ENCOUNTER — APPOINTMENT (OUTPATIENT)
Dept: CT IMAGING | Facility: HOSPITAL | Age: 69
End: 2024-09-15
Payer: MEDICARE

## 2024-09-15 DIAGNOSIS — R55 SYNCOPE, UNSPECIFIED SYNCOPE TYPE: Primary | ICD-10-CM

## 2024-09-15 DIAGNOSIS — R00.1 BRADYCARDIA: ICD-10-CM

## 2024-09-15 DIAGNOSIS — E87.6 HYPOKALEMIA: ICD-10-CM

## 2024-09-15 LAB
ALBUMIN SERPL-MCNC: 3.9 G/DL (ref 3.5–5.2)
ALBUMIN/GLOB SERPL: 1.8 G/DL
ALP SERPL-CCNC: 65 U/L (ref 39–117)
ALT SERPL W P-5'-P-CCNC: 25 U/L (ref 1–33)
ANION GAP SERPL CALCULATED.3IONS-SCNC: 10.8 MMOL/L (ref 5–15)
ANION GAP SERPL CALCULATED.3IONS-SCNC: 9.3 MMOL/L (ref 5–15)
AST SERPL-CCNC: 33 U/L (ref 1–32)
BASOPHILS # BLD AUTO: 0.04 10*3/MM3 (ref 0–0.2)
BASOPHILS # BLD AUTO: 0.05 10*3/MM3 (ref 0–0.2)
BASOPHILS NFR BLD AUTO: 0.6 % (ref 0–1.5)
BASOPHILS NFR BLD AUTO: 0.7 % (ref 0–1.5)
BILIRUB SERPL-MCNC: 0.5 MG/DL (ref 0–1.2)
BUN SERPL-MCNC: 23 MG/DL (ref 8–23)
BUN SERPL-MCNC: 24 MG/DL (ref 8–23)
BUN/CREAT SERPL: 21.8 (ref 7–25)
BUN/CREAT SERPL: 22.8 (ref 7–25)
CALCIUM SPEC-SCNC: 8.9 MG/DL (ref 8.6–10.5)
CALCIUM SPEC-SCNC: 9.1 MG/DL (ref 8.6–10.5)
CHLORIDE SERPL-SCNC: 100 MMOL/L (ref 98–107)
CHLORIDE SERPL-SCNC: 103 MMOL/L (ref 98–107)
CK SERPL-CCNC: 207 U/L (ref 20–180)
CO2 SERPL-SCNC: 31.2 MMOL/L (ref 22–29)
CO2 SERPL-SCNC: 31.7 MMOL/L (ref 22–29)
CREAT SERPL-MCNC: 1.01 MG/DL (ref 0.57–1)
CREAT SERPL-MCNC: 1.1 MG/DL (ref 0.57–1)
DEPRECATED RDW RBC AUTO: 43.3 FL (ref 37–54)
DEPRECATED RDW RBC AUTO: 43.7 FL (ref 37–54)
EGFRCR SERPLBLD CKD-EPI 2021: 55.2 ML/MIN/1.73
EGFRCR SERPLBLD CKD-EPI 2021: 61.1 ML/MIN/1.73
EOSINOPHIL # BLD AUTO: 0.08 10*3/MM3 (ref 0–0.4)
EOSINOPHIL # BLD AUTO: 0.1 10*3/MM3 (ref 0–0.4)
EOSINOPHIL NFR BLD AUTO: 1.2 % (ref 0.3–6.2)
EOSINOPHIL NFR BLD AUTO: 1.4 % (ref 0.3–6.2)
ERYTHROCYTE [DISTWIDTH] IN BLOOD BY AUTOMATED COUNT: 12.8 % (ref 12.3–15.4)
ERYTHROCYTE [DISTWIDTH] IN BLOOD BY AUTOMATED COUNT: 12.8 % (ref 12.3–15.4)
GLOBULIN UR ELPH-MCNC: 2.2 GM/DL
GLUCOSE BLDC GLUCOMTR-MCNC: 156 MG/DL (ref 70–105)
GLUCOSE BLDC GLUCOMTR-MCNC: 203 MG/DL (ref 70–105)
GLUCOSE SERPL-MCNC: 153 MG/DL (ref 65–99)
GLUCOSE SERPL-MCNC: 84 MG/DL (ref 65–99)
HCT VFR BLD AUTO: 39.4 % (ref 34–46.6)
HCT VFR BLD AUTO: 45.3 % (ref 34–46.6)
HGB BLD-MCNC: 13 G/DL (ref 12–15.9)
HGB BLD-MCNC: 14.8 G/DL (ref 12–15.9)
HOLD SPECIMEN: NORMAL
IMM GRANULOCYTES # BLD AUTO: 0.02 10*3/MM3 (ref 0–0.05)
IMM GRANULOCYTES # BLD AUTO: 0.03 10*3/MM3 (ref 0–0.05)
IMM GRANULOCYTES NFR BLD AUTO: 0.3 % (ref 0–0.5)
IMM GRANULOCYTES NFR BLD AUTO: 0.4 % (ref 0–0.5)
LYMPHOCYTES # BLD AUTO: 1.26 10*3/MM3 (ref 0.7–3.1)
LYMPHOCYTES # BLD AUTO: 1.7 10*3/MM3 (ref 0.7–3.1)
LYMPHOCYTES NFR BLD AUTO: 18.6 % (ref 19.6–45.3)
LYMPHOCYTES NFR BLD AUTO: 24.6 % (ref 19.6–45.3)
MAGNESIUM SERPL-MCNC: 2.2 MG/DL (ref 1.6–2.4)
MAGNESIUM SERPL-MCNC: 2.4 MG/DL (ref 1.6–2.4)
MCH RBC QN AUTO: 30.1 PG (ref 26.6–33)
MCH RBC QN AUTO: 30.4 PG (ref 26.6–33)
MCHC RBC AUTO-ENTMCNC: 32.7 G/DL (ref 31.5–35.7)
MCHC RBC AUTO-ENTMCNC: 33 G/DL (ref 31.5–35.7)
MCV RBC AUTO: 92.1 FL (ref 79–97)
MCV RBC AUTO: 92.3 FL (ref 79–97)
MONOCYTES # BLD AUTO: 0.53 10*3/MM3 (ref 0.1–0.9)
MONOCYTES # BLD AUTO: 0.68 10*3/MM3 (ref 0.1–0.9)
MONOCYTES NFR BLD AUTO: 7.8 % (ref 5–12)
MONOCYTES NFR BLD AUTO: 9.8 % (ref 5–12)
NEUTROPHILS NFR BLD AUTO: 4.37 10*3/MM3 (ref 1.7–7)
NEUTROPHILS NFR BLD AUTO: 4.81 10*3/MM3 (ref 1.7–7)
NEUTROPHILS NFR BLD AUTO: 63.3 % (ref 42.7–76)
NEUTROPHILS NFR BLD AUTO: 71.3 % (ref 42.7–76)
NRBC BLD AUTO-RTO: 0 /100 WBC (ref 0–0.2)
NRBC BLD AUTO-RTO: 0 /100 WBC (ref 0–0.2)
PLATELET # BLD AUTO: 152 10*3/MM3 (ref 140–450)
PLATELET # BLD AUTO: 185 10*3/MM3 (ref 140–450)
PMV BLD AUTO: 11.5 FL (ref 6–12)
PMV BLD AUTO: 11.9 FL (ref 6–12)
POTASSIUM SERPL-SCNC: 2.8 MMOL/L (ref 3.5–5.2)
POTASSIUM SERPL-SCNC: 2.9 MMOL/L (ref 3.5–5.2)
PROT SERPL-MCNC: 6.1 G/DL (ref 6–8.5)
QT INTERVAL: 511 MS
QTC INTERVAL: 508 MS
RBC # BLD AUTO: 4.27 10*6/MM3 (ref 3.77–5.28)
RBC # BLD AUTO: 4.92 10*6/MM3 (ref 3.77–5.28)
SODIUM SERPL-SCNC: 142 MMOL/L (ref 136–145)
SODIUM SERPL-SCNC: 144 MMOL/L (ref 136–145)
T4 FREE SERPL-MCNC: 1.75 NG/DL (ref 0.93–1.7)
TSH SERPL DL<=0.05 MIU/L-ACNC: 0.1 UIU/ML (ref 0.27–4.2)
WBC NRBC COR # BLD AUTO: 6.76 10*3/MM3 (ref 3.4–10.8)
WBC NRBC COR # BLD AUTO: 6.91 10*3/MM3 (ref 3.4–10.8)

## 2024-09-15 PROCEDURE — G0378 HOSPITAL OBSERVATION PER HR: HCPCS

## 2024-09-15 PROCEDURE — 82550 ASSAY OF CK (CPK): CPT | Performed by: EMERGENCY MEDICINE

## 2024-09-15 PROCEDURE — 85025 COMPLETE CBC W/AUTO DIFF WBC: CPT | Performed by: FAMILY MEDICINE

## 2024-09-15 PROCEDURE — 84439 ASSAY OF FREE THYROXINE: CPT | Performed by: EMERGENCY MEDICINE

## 2024-09-15 PROCEDURE — 85025 COMPLETE CBC W/AUTO DIFF WBC: CPT | Performed by: EMERGENCY MEDICINE

## 2024-09-15 PROCEDURE — 72125 CT NECK SPINE W/O DYE: CPT

## 2024-09-15 PROCEDURE — 99285 EMERGENCY DEPT VISIT HI MDM: CPT

## 2024-09-15 PROCEDURE — 82948 REAGENT STRIP/BLOOD GLUCOSE: CPT

## 2024-09-15 PROCEDURE — 36415 COLL VENOUS BLD VENIPUNCTURE: CPT

## 2024-09-15 PROCEDURE — 80053 COMPREHEN METABOLIC PANEL: CPT | Performed by: EMERGENCY MEDICINE

## 2024-09-15 PROCEDURE — 83735 ASSAY OF MAGNESIUM: CPT | Performed by: FAMILY MEDICINE

## 2024-09-15 PROCEDURE — 71045 X-RAY EXAM CHEST 1 VIEW: CPT

## 2024-09-15 PROCEDURE — 25810000003 LACTATED RINGERS SOLUTION: Performed by: EMERGENCY MEDICINE

## 2024-09-15 PROCEDURE — 93005 ELECTROCARDIOGRAM TRACING: CPT

## 2024-09-15 PROCEDURE — 70450 CT HEAD/BRAIN W/O DYE: CPT

## 2024-09-15 PROCEDURE — 83735 ASSAY OF MAGNESIUM: CPT | Performed by: EMERGENCY MEDICINE

## 2024-09-15 PROCEDURE — 84443 ASSAY THYROID STIM HORMONE: CPT | Performed by: EMERGENCY MEDICINE

## 2024-09-15 PROCEDURE — 93005 ELECTROCARDIOGRAM TRACING: CPT | Performed by: EMERGENCY MEDICINE

## 2024-09-15 RX ORDER — ACETAMINOPHEN 650 MG/1
650 SUPPOSITORY RECTAL EVERY 4 HOURS PRN
Status: DISCONTINUED | OUTPATIENT
Start: 2024-09-15 | End: 2024-09-17 | Stop reason: HOSPADM

## 2024-09-15 RX ORDER — ROSUVASTATIN CALCIUM 5 MG/1
5 TABLET, COATED ORAL 3 TIMES WEEKLY
COMMUNITY

## 2024-09-15 RX ORDER — ACETAMINOPHEN 160 MG/5ML
650 SOLUTION ORAL EVERY 4 HOURS PRN
Status: DISCONTINUED | OUTPATIENT
Start: 2024-09-15 | End: 2024-09-17 | Stop reason: HOSPADM

## 2024-09-15 RX ORDER — ROSUVASTATIN CALCIUM 5 MG/1
5 TABLET, COATED ORAL 3 TIMES WEEKLY
Status: DISCONTINUED | OUTPATIENT
Start: 2024-09-16 | End: 2024-09-17 | Stop reason: HOSPADM

## 2024-09-15 RX ORDER — AMOXICILLIN 250 MG
2 CAPSULE ORAL 2 TIMES DAILY PRN
Status: DISCONTINUED | OUTPATIENT
Start: 2024-09-15 | End: 2024-09-17 | Stop reason: HOSPADM

## 2024-09-15 RX ORDER — POTASSIUM CHLORIDE 1500 MG/1
40 TABLET, EXTENDED RELEASE ORAL ONCE
Status: COMPLETED | OUTPATIENT
Start: 2024-09-15 | End: 2024-09-15

## 2024-09-15 RX ORDER — ONDANSETRON 4 MG/1
4 TABLET, ORALLY DISINTEGRATING ORAL EVERY 6 HOURS PRN
Status: DISCONTINUED | OUTPATIENT
Start: 2024-09-15 | End: 2024-09-17 | Stop reason: HOSPADM

## 2024-09-15 RX ORDER — POTASSIUM CHLORIDE 1500 MG/1
20 TABLET, EXTENDED RELEASE ORAL 2 TIMES DAILY
Status: DISCONTINUED | OUTPATIENT
Start: 2024-09-16 | End: 2024-09-17 | Stop reason: HOSPADM

## 2024-09-15 RX ORDER — LEVOTHYROXINE SODIUM 100 UG/1
100 TABLET ORAL
Status: DISCONTINUED | OUTPATIENT
Start: 2024-09-16 | End: 2024-09-17 | Stop reason: HOSPADM

## 2024-09-15 RX ORDER — SODIUM CHLORIDE 0.9 % (FLUSH) 0.9 %
10 SYRINGE (ML) INJECTION AS NEEDED
Status: DISCONTINUED | OUTPATIENT
Start: 2024-09-15 | End: 2024-09-17 | Stop reason: HOSPADM

## 2024-09-15 RX ORDER — BISACODYL 10 MG
10 SUPPOSITORY, RECTAL RECTAL DAILY PRN
Status: DISCONTINUED | OUTPATIENT
Start: 2024-09-15 | End: 2024-09-17 | Stop reason: HOSPADM

## 2024-09-15 RX ORDER — ATORVASTATIN CALCIUM 20 MG/1
20 TABLET, FILM COATED ORAL DAILY
Status: DISCONTINUED | OUTPATIENT
Start: 2024-09-15 | End: 2024-09-15 | Stop reason: ALTCHOICE

## 2024-09-15 RX ORDER — ACETAMINOPHEN 325 MG/1
650 TABLET ORAL EVERY 4 HOURS PRN
Status: DISCONTINUED | OUTPATIENT
Start: 2024-09-15 | End: 2024-09-17 | Stop reason: HOSPADM

## 2024-09-15 RX ORDER — POTASSIUM CHLORIDE 1500 MG/1
40 TABLET, EXTENDED RELEASE ORAL EVERY 4 HOURS
Status: COMPLETED | OUTPATIENT
Start: 2024-09-15 | End: 2024-09-16

## 2024-09-15 RX ORDER — SODIUM CHLORIDE 0.9 % (FLUSH) 0.9 %
10 SYRINGE (ML) INJECTION EVERY 12 HOURS SCHEDULED
Status: DISCONTINUED | OUTPATIENT
Start: 2024-09-15 | End: 2024-09-17 | Stop reason: HOSPADM

## 2024-09-15 RX ORDER — BISACODYL 5 MG/1
5 TABLET, DELAYED RELEASE ORAL DAILY PRN
Status: DISCONTINUED | OUTPATIENT
Start: 2024-09-15 | End: 2024-09-17 | Stop reason: HOSPADM

## 2024-09-15 RX ORDER — POLYETHYLENE GLYCOL 3350 17 G/17G
17 POWDER, FOR SOLUTION ORAL DAILY PRN
Status: DISCONTINUED | OUTPATIENT
Start: 2024-09-15 | End: 2024-09-17 | Stop reason: HOSPADM

## 2024-09-15 RX ORDER — SODIUM CHLORIDE 9 MG/ML
40 INJECTION, SOLUTION INTRAVENOUS AS NEEDED
Status: DISCONTINUED | OUTPATIENT
Start: 2024-09-15 | End: 2024-09-17 | Stop reason: HOSPADM

## 2024-09-15 RX ORDER — ONDANSETRON 2 MG/ML
4 INJECTION INTRAMUSCULAR; INTRAVENOUS EVERY 6 HOURS PRN
Status: DISCONTINUED | OUTPATIENT
Start: 2024-09-15 | End: 2024-09-17 | Stop reason: HOSPADM

## 2024-09-15 RX ADMIN — SODIUM CHLORIDE, POTASSIUM CHLORIDE, SODIUM LACTATE AND CALCIUM CHLORIDE 1000 ML: 600; 310; 30; 20 INJECTION, SOLUTION INTRAVENOUS at 13:18

## 2024-09-15 RX ADMIN — POTASSIUM CHLORIDE 40 MEQ: 1500 TABLET, EXTENDED RELEASE ORAL at 15:10

## 2024-09-15 RX ADMIN — SODIUM CHLORIDE, POTASSIUM CHLORIDE, SODIUM LACTATE AND CALCIUM CHLORIDE 1000 ML: 600; 310; 30; 20 INJECTION, SOLUTION INTRAVENOUS at 11:27

## 2024-09-15 RX ADMIN — Medication 10 ML: at 21:32

## 2024-09-15 RX ADMIN — POTASSIUM CHLORIDE 40 MEQ: 1500 TABLET, EXTENDED RELEASE ORAL at 19:39

## 2024-09-16 LAB
ANION GAP SERPL CALCULATED.3IONS-SCNC: 6.7 MMOL/L (ref 5–15)
BASOPHILS # BLD AUTO: 0.03 10*3/MM3 (ref 0–0.2)
BASOPHILS NFR BLD AUTO: 0.5 % (ref 0–1.5)
BUN SERPL-MCNC: 23 MG/DL (ref 8–23)
BUN/CREAT SERPL: 22.1 (ref 7–25)
CALCIUM SPEC-SCNC: 8.7 MG/DL (ref 8.6–10.5)
CHLORIDE SERPL-SCNC: 106 MMOL/L (ref 98–107)
CO2 SERPL-SCNC: 30.3 MMOL/L (ref 22–29)
CREAT SERPL-MCNC: 1.04 MG/DL (ref 0.57–1)
DEPRECATED RDW RBC AUTO: 43.5 FL (ref 37–54)
EGFRCR SERPLBLD CKD-EPI 2021: 59 ML/MIN/1.73
EOSINOPHIL # BLD AUTO: 0.12 10*3/MM3 (ref 0–0.4)
EOSINOPHIL NFR BLD AUTO: 2 % (ref 0.3–6.2)
ERYTHROCYTE [DISTWIDTH] IN BLOOD BY AUTOMATED COUNT: 12.8 % (ref 12.3–15.4)
GLUCOSE BLDC GLUCOMTR-MCNC: 121 MG/DL (ref 70–105)
GLUCOSE BLDC GLUCOMTR-MCNC: 132 MG/DL (ref 70–105)
GLUCOSE BLDC GLUCOMTR-MCNC: 135 MG/DL (ref 70–105)
GLUCOSE BLDC GLUCOMTR-MCNC: 139 MG/DL (ref 70–105)
GLUCOSE SERPL-MCNC: 123 MG/DL (ref 65–99)
HCT VFR BLD AUTO: 36.9 % (ref 34–46.6)
HGB BLD-MCNC: 12 G/DL (ref 12–15.9)
IMM GRANULOCYTES # BLD AUTO: 0.02 10*3/MM3 (ref 0–0.05)
IMM GRANULOCYTES NFR BLD AUTO: 0.3 % (ref 0–0.5)
LYMPHOCYTES # BLD AUTO: 1.59 10*3/MM3 (ref 0.7–3.1)
LYMPHOCYTES NFR BLD AUTO: 26.1 % (ref 19.6–45.3)
MAGNESIUM SERPL-MCNC: 2.2 MG/DL (ref 1.6–2.4)
MCH RBC QN AUTO: 30 PG (ref 26.6–33)
MCHC RBC AUTO-ENTMCNC: 32.5 G/DL (ref 31.5–35.7)
MCV RBC AUTO: 92.3 FL (ref 79–97)
MONOCYTES # BLD AUTO: 0.76 10*3/MM3 (ref 0.1–0.9)
MONOCYTES NFR BLD AUTO: 12.5 % (ref 5–12)
NEUTROPHILS NFR BLD AUTO: 3.57 10*3/MM3 (ref 1.7–7)
NEUTROPHILS NFR BLD AUTO: 58.6 % (ref 42.7–76)
NRBC BLD AUTO-RTO: 0 /100 WBC (ref 0–0.2)
PLATELET # BLD AUTO: 140 10*3/MM3 (ref 140–450)
PMV BLD AUTO: 11.6 FL (ref 6–12)
POTASSIUM SERPL-SCNC: 3.7 MMOL/L (ref 3.5–5.2)
QT INTERVAL: 478 MS
QTC INTERVAL: 512 MS
RBC # BLD AUTO: 4 10*6/MM3 (ref 3.77–5.28)
SODIUM SERPL-SCNC: 143 MMOL/L (ref 136–145)
WBC NRBC COR # BLD AUTO: 6.09 10*3/MM3 (ref 3.4–10.8)

## 2024-09-16 PROCEDURE — 82948 REAGENT STRIP/BLOOD GLUCOSE: CPT | Performed by: FAMILY MEDICINE

## 2024-09-16 PROCEDURE — 99222 1ST HOSP IP/OBS MODERATE 55: CPT | Performed by: INTERNAL MEDICINE

## 2024-09-16 PROCEDURE — 83735 ASSAY OF MAGNESIUM: CPT | Performed by: FAMILY MEDICINE

## 2024-09-16 PROCEDURE — 80048 BASIC METABOLIC PNL TOTAL CA: CPT | Performed by: FAMILY MEDICINE

## 2024-09-16 PROCEDURE — 82948 REAGENT STRIP/BLOOD GLUCOSE: CPT

## 2024-09-16 PROCEDURE — 85025 COMPLETE CBC W/AUTO DIFF WBC: CPT | Performed by: FAMILY MEDICINE

## 2024-09-16 RX ADMIN — POTASSIUM CHLORIDE 20 MEQ: 1500 TABLET, EXTENDED RELEASE ORAL at 09:38

## 2024-09-16 RX ADMIN — EMPAGLIFLOZIN 25 MG: 25 TABLET, FILM COATED ORAL at 09:38

## 2024-09-16 RX ADMIN — POTASSIUM CHLORIDE 40 MEQ: 1500 TABLET, EXTENDED RELEASE ORAL at 00:31

## 2024-09-16 RX ADMIN — Medication 10 ML: at 09:39

## 2024-09-16 RX ADMIN — Medication 10 ML: at 20:28

## 2024-09-16 RX ADMIN — POTASSIUM CHLORIDE 20 MEQ: 1500 TABLET, EXTENDED RELEASE ORAL at 20:27

## 2024-09-16 RX ADMIN — ROSUVASTATIN CALCIUM 5 MG: 5 TABLET, COATED ORAL at 09:38

## 2024-09-17 ENCOUNTER — READMISSION MANAGEMENT (OUTPATIENT)
Dept: CALL CENTER | Facility: HOSPITAL | Age: 69
End: 2024-09-17
Payer: MEDICARE

## 2024-09-17 ENCOUNTER — APPOINTMENT (OUTPATIENT)
Dept: RESPIRATORY THERAPY | Facility: HOSPITAL | Age: 69
End: 2024-09-17
Payer: MEDICARE

## 2024-09-17 VITALS
TEMPERATURE: 97.8 F | BODY MASS INDEX: 28.54 KG/M2 | DIASTOLIC BLOOD PRESSURE: 82 MMHG | OXYGEN SATURATION: 96 % | HEIGHT: 65 IN | WEIGHT: 171.3 LBS | RESPIRATION RATE: 20 BRPM | HEART RATE: 65 BPM | SYSTOLIC BLOOD PRESSURE: 173 MMHG

## 2024-09-17 LAB
ANION GAP SERPL CALCULATED.3IONS-SCNC: 8 MMOL/L (ref 5–15)
BASOPHILS # BLD AUTO: 0.04 10*3/MM3 (ref 0–0.2)
BASOPHILS NFR BLD AUTO: 0.6 % (ref 0–1.5)
BUN SERPL-MCNC: 24 MG/DL (ref 8–23)
BUN/CREAT SERPL: 22 (ref 7–25)
CALCIUM SPEC-SCNC: 8.5 MG/DL (ref 8.6–10.5)
CHLORIDE SERPL-SCNC: 106 MMOL/L (ref 98–107)
CO2 SERPL-SCNC: 29 MMOL/L (ref 22–29)
CREAT SERPL-MCNC: 1.09 MG/DL (ref 0.57–1)
DEPRECATED RDW RBC AUTO: 43.5 FL (ref 37–54)
EGFRCR SERPLBLD CKD-EPI 2021: 55.8 ML/MIN/1.73
EOSINOPHIL # BLD AUTO: 0.12 10*3/MM3 (ref 0–0.4)
EOSINOPHIL NFR BLD AUTO: 1.8 % (ref 0.3–6.2)
ERYTHROCYTE [DISTWIDTH] IN BLOOD BY AUTOMATED COUNT: 12.7 % (ref 12.3–15.4)
GLUCOSE BLDC GLUCOMTR-MCNC: 122 MG/DL (ref 70–105)
GLUCOSE BLDC GLUCOMTR-MCNC: 230 MG/DL (ref 70–105)
GLUCOSE SERPL-MCNC: 119 MG/DL (ref 65–99)
HCT VFR BLD AUTO: 39.8 % (ref 34–46.6)
HGB BLD-MCNC: 13.1 G/DL (ref 12–15.9)
IMM GRANULOCYTES # BLD AUTO: 0.04 10*3/MM3 (ref 0–0.05)
IMM GRANULOCYTES NFR BLD AUTO: 0.6 % (ref 0–0.5)
LYMPHOCYTES # BLD AUTO: 1.67 10*3/MM3 (ref 0.7–3.1)
LYMPHOCYTES NFR BLD AUTO: 25.4 % (ref 19.6–45.3)
MAGNESIUM SERPL-MCNC: 2.1 MG/DL (ref 1.6–2.4)
MCH RBC QN AUTO: 30.7 PG (ref 26.6–33)
MCHC RBC AUTO-ENTMCNC: 32.9 G/DL (ref 31.5–35.7)
MCV RBC AUTO: 93.2 FL (ref 79–97)
MONOCYTES # BLD AUTO: 0.6 10*3/MM3 (ref 0.1–0.9)
MONOCYTES NFR BLD AUTO: 9.1 % (ref 5–12)
NEUTROPHILS NFR BLD AUTO: 4.1 10*3/MM3 (ref 1.7–7)
NEUTROPHILS NFR BLD AUTO: 62.5 % (ref 42.7–76)
NRBC BLD AUTO-RTO: 0 /100 WBC (ref 0–0.2)
PLATELET # BLD AUTO: 153 10*3/MM3 (ref 140–450)
PMV BLD AUTO: 12 FL (ref 6–12)
POTASSIUM SERPL-SCNC: 3.7 MMOL/L (ref 3.5–5.2)
RBC # BLD AUTO: 4.27 10*6/MM3 (ref 3.77–5.28)
SODIUM SERPL-SCNC: 143 MMOL/L (ref 136–145)
WBC NRBC COR # BLD AUTO: 6.57 10*3/MM3 (ref 3.4–10.8)

## 2024-09-17 PROCEDURE — 83735 ASSAY OF MAGNESIUM: CPT | Performed by: FAMILY MEDICINE

## 2024-09-17 PROCEDURE — 80048 BASIC METABOLIC PNL TOTAL CA: CPT | Performed by: FAMILY MEDICINE

## 2024-09-17 PROCEDURE — 82948 REAGENT STRIP/BLOOD GLUCOSE: CPT | Performed by: FAMILY MEDICINE

## 2024-09-17 PROCEDURE — 99232 SBSQ HOSP IP/OBS MODERATE 35: CPT

## 2024-09-17 PROCEDURE — 85025 COMPLETE CBC W/AUTO DIFF WBC: CPT | Performed by: FAMILY MEDICINE

## 2024-09-17 RX ORDER — HYDRALAZINE HYDROCHLORIDE 25 MG/1
50 TABLET, FILM COATED ORAL EVERY 12 HOURS SCHEDULED
Status: DISCONTINUED | OUTPATIENT
Start: 2024-09-17 | End: 2024-09-17 | Stop reason: HOSPADM

## 2024-09-17 RX ADMIN — LEVOTHYROXINE SODIUM 100 MCG: 0.1 TABLET ORAL at 05:09

## 2024-09-17 RX ADMIN — EMPAGLIFLOZIN 25 MG: 25 TABLET, FILM COATED ORAL at 09:27

## 2024-09-17 RX ADMIN — POTASSIUM CHLORIDE 20 MEQ: 1500 TABLET, EXTENDED RELEASE ORAL at 09:27

## 2024-09-17 RX ADMIN — HYDRALAZINE HYDROCHLORIDE 50 MG: 25 TABLET ORAL at 09:27

## 2024-09-17 RX ADMIN — Medication 10 ML: at 09:27

## 2024-09-24 ENCOUNTER — TELEPHONE (OUTPATIENT)
Dept: CARDIOLOGY | Facility: CLINIC | Age: 69
End: 2024-09-24
Payer: MEDICARE

## 2024-09-25 ENCOUNTER — READMISSION MANAGEMENT (OUTPATIENT)
Dept: CALL CENTER | Facility: HOSPITAL | Age: 69
End: 2024-09-25
Payer: MEDICARE

## 2024-10-03 ENCOUNTER — TELEPHONE (OUTPATIENT)
Dept: CARDIOLOGY | Facility: CLINIC | Age: 69
End: 2024-10-03
Payer: MEDICARE

## 2024-10-03 NOTE — TELEPHONE ENCOUNTER
Rx Refill Note  Requested Prescriptions     Pending Prescriptions Disp Refills    apixaban (ELIQUIS) 5 MG tablet tablet 56 tablet 0     Sig: Take 1 tablet by mouth 2 (Two) Times a Day.      Last office visit with prescribing clinician: Visit date not found   Last telemedicine visit with prescribing clinician: Visit date not found   Next office visit with prescribing clinician: Visit date not found                         Would you like a call back once the refill request has been completed: [] Yes [] No    If the office needs to give you a call back, can they leave a voicemail: [] Yes [] No    Kim Baptiste MA  10/03/24, 11:35 EDT

## 2024-10-03 NOTE — TELEPHONE ENCOUNTER
Called and spoke to patient regarding urgent report from Ernie regarding new onset A-fib on monitor study.  Discussed with Dr. Chao and will start patient on anticoagulation    Please get Eliquis 5 mg BID samples for patient to  today. She will  be in this afternoon     Will keep appointment 10/14/2024 with Dr. Chao to further discuss treatment plan monitor results    Thank you

## 2024-10-04 ENCOUNTER — HOSPITAL ENCOUNTER (OUTPATIENT)
Facility: HOSPITAL | Age: 69
Setting detail: OBSERVATION
Discharge: HOME | End: 2024-10-06
Attending: EMERGENCY MEDICINE | Admitting: INTERNAL MEDICINE
Payer: MEDICARE

## 2024-10-04 DIAGNOSIS — R00.2 PALPITATIONS: Primary | ICD-10-CM

## 2024-10-04 LAB
ANION GAP SERPL CALCULATED.3IONS-SCNC: 9.8 MMOL/L (ref 5–15)
BASOPHILS # BLD AUTO: 0.04 10*3/MM3 (ref 0–0.2)
BASOPHILS NFR BLD AUTO: 0.6 % (ref 0–1.5)
BUN SERPL-MCNC: 19 MG/DL (ref 8–23)
BUN/CREAT SERPL: 17.8 (ref 7–25)
CALCIUM SPEC-SCNC: 8.9 MG/DL (ref 8.6–10.5)
CHLORIDE SERPL-SCNC: 103 MMOL/L (ref 98–107)
CO2 SERPL-SCNC: 25.2 MMOL/L (ref 22–29)
CREAT SERPL-MCNC: 1.07 MG/DL (ref 0.57–1)
DEPRECATED RDW RBC AUTO: 44.5 FL (ref 37–54)
EGFRCR SERPLBLD CKD-EPI 2021: 57 ML/MIN/1.73
EOSINOPHIL # BLD AUTO: 0.03 10*3/MM3 (ref 0–0.4)
EOSINOPHIL NFR BLD AUTO: 0.4 % (ref 0.3–6.2)
ERYTHROCYTE [DISTWIDTH] IN BLOOD BY AUTOMATED COUNT: 13 % (ref 12.3–15.4)
GLUCOSE SERPL-MCNC: 141 MG/DL (ref 65–99)
HCT VFR BLD AUTO: 43.1 % (ref 34–46.6)
HGB BLD-MCNC: 13.7 G/DL (ref 12–15.9)
HOLD SPECIMEN: NORMAL
IMM GRANULOCYTES # BLD AUTO: 0.04 10*3/MM3 (ref 0–0.05)
IMM GRANULOCYTES NFR BLD AUTO: 0.6 % (ref 0–0.5)
LYMPHOCYTES # BLD AUTO: 0.85 10*3/MM3 (ref 0.7–3.1)
LYMPHOCYTES NFR BLD AUTO: 11.7 % (ref 19.6–45.3)
MAGNESIUM SERPL-MCNC: 2.1 MG/DL (ref 1.6–2.4)
MCH RBC QN AUTO: 29.9 PG (ref 26.6–33)
MCHC RBC AUTO-ENTMCNC: 31.8 G/DL (ref 31.5–35.7)
MCV RBC AUTO: 94.1 FL (ref 79–97)
MONOCYTES # BLD AUTO: 0.92 10*3/MM3 (ref 0.1–0.9)
MONOCYTES NFR BLD AUTO: 12.7 % (ref 5–12)
NEUTROPHILS NFR BLD AUTO: 5.39 10*3/MM3 (ref 1.7–7)
NEUTROPHILS NFR BLD AUTO: 74 % (ref 42.7–76)
NRBC BLD AUTO-RTO: 0 /100 WBC (ref 0–0.2)
PLATELET # BLD AUTO: 166 10*3/MM3 (ref 140–450)
PMV BLD AUTO: 11.5 FL (ref 6–12)
POTASSIUM SERPL-SCNC: 3.4 MMOL/L (ref 3.5–5.2)
RBC # BLD AUTO: 4.58 10*6/MM3 (ref 3.77–5.28)
SODIUM SERPL-SCNC: 138 MMOL/L (ref 136–145)
TSH SERPL DL<=0.05 MIU/L-ACNC: 0.08 UIU/ML (ref 0.27–4.2)
WBC NRBC COR # BLD AUTO: 7.27 10*3/MM3 (ref 3.4–10.8)
WHOLE BLOOD HOLD COAG: NORMAL

## 2024-10-04 PROCEDURE — 85025 COMPLETE CBC W/AUTO DIFF WBC: CPT | Performed by: EMERGENCY MEDICINE

## 2024-10-04 PROCEDURE — 25010000002 MORPHINE PER 10 MG: Performed by: EMERGENCY MEDICINE

## 2024-10-04 PROCEDURE — 84439 ASSAY OF FREE THYROXINE: CPT | Performed by: INTERNAL MEDICINE

## 2024-10-04 PROCEDURE — 80048 BASIC METABOLIC PNL TOTAL CA: CPT | Performed by: EMERGENCY MEDICINE

## 2024-10-04 PROCEDURE — 93005 ELECTROCARDIOGRAM TRACING: CPT

## 2024-10-04 PROCEDURE — 83735 ASSAY OF MAGNESIUM: CPT | Performed by: EMERGENCY MEDICINE

## 2024-10-04 PROCEDURE — 93005 ELECTROCARDIOGRAM TRACING: CPT | Performed by: EMERGENCY MEDICINE

## 2024-10-04 PROCEDURE — 99285 EMERGENCY DEPT VISIT HI MDM: CPT

## 2024-10-04 PROCEDURE — 96374 THER/PROPH/DIAG INJ IV PUSH: CPT

## 2024-10-04 PROCEDURE — 84443 ASSAY THYROID STIM HORMONE: CPT | Performed by: EMERGENCY MEDICINE

## 2024-10-04 RX ORDER — SODIUM CHLORIDE 0.9 % (FLUSH) 0.9 %
10 SYRINGE (ML) INJECTION AS NEEDED
Status: DISCONTINUED | OUTPATIENT
Start: 2024-10-04 | End: 2024-10-06 | Stop reason: HOSPADM

## 2024-10-04 RX ORDER — POTASSIUM CHLORIDE 1500 MG/1
20 TABLET, EXTENDED RELEASE ORAL 2 TIMES DAILY
COMMUNITY
Start: 2024-09-16

## 2024-10-04 RX ORDER — MORPHINE SULFATE 2 MG/ML
2 INJECTION, SOLUTION INTRAMUSCULAR; INTRAVENOUS ONCE
Status: COMPLETED | OUTPATIENT
Start: 2024-10-04 | End: 2024-10-04

## 2024-10-04 RX ORDER — AMLODIPINE BESYLATE 5 MG/1
5 TABLET ORAL DAILY
COMMUNITY
Start: 2024-09-20

## 2024-10-04 RX ADMIN — MORPHINE SULFATE 2 MG: 2 INJECTION, SOLUTION INTRAMUSCULAR; INTRAVENOUS at 20:27

## 2024-10-05 PROBLEM — I48.91 NEW ONSET ATRIAL FIBRILLATION: Status: ACTIVE | Noted: 2024-10-05

## 2024-10-05 LAB
B PARAPERT DNA SPEC QL NAA+PROBE: NOT DETECTED
B PERT DNA SPEC QL NAA+PROBE: NOT DETECTED
C PNEUM DNA NPH QL NAA+NON-PROBE: NOT DETECTED
FLUAV SUBTYP SPEC NAA+PROBE: NOT DETECTED
FLUBV RNA ISLT QL NAA+PROBE: NOT DETECTED
HADV DNA SPEC NAA+PROBE: NOT DETECTED
HCOV 229E RNA SPEC QL NAA+PROBE: NOT DETECTED
HCOV HKU1 RNA SPEC QL NAA+PROBE: NOT DETECTED
HCOV NL63 RNA SPEC QL NAA+PROBE: NOT DETECTED
HCOV OC43 RNA SPEC QL NAA+PROBE: NOT DETECTED
HMPV RNA NPH QL NAA+NON-PROBE: NOT DETECTED
HPIV1 RNA ISLT QL NAA+PROBE: NOT DETECTED
HPIV2 RNA SPEC QL NAA+PROBE: NOT DETECTED
HPIV3 RNA NPH QL NAA+PROBE: NOT DETECTED
HPIV4 P GENE NPH QL NAA+PROBE: NOT DETECTED
M PNEUMO IGG SER IA-ACNC: NOT DETECTED
QT INTERVAL: 389 MS
QTC INTERVAL: 497 MS
RHINOVIRUS RNA SPEC NAA+PROBE: NOT DETECTED
RSV RNA NPH QL NAA+NON-PROBE: NOT DETECTED
SARS-COV-2 RNA NPH QL NAA+NON-PROBE: DETECTED
T4 FREE SERPL-MCNC: 1.73 NG/DL (ref 0.93–1.7)

## 2024-10-05 PROCEDURE — G0378 HOSPITAL OBSERVATION PER HR: HCPCS

## 2024-10-05 PROCEDURE — 99214 OFFICE O/P EST MOD 30 MIN: CPT | Performed by: INTERNAL MEDICINE

## 2024-10-05 PROCEDURE — 0202U NFCT DS 22 TRGT SARS-COV-2: CPT | Performed by: INTERNAL MEDICINE

## 2024-10-05 RX ORDER — SODIUM CHLORIDE 0.9 % (FLUSH) 0.9 %
10 SYRINGE (ML) INJECTION AS NEEDED
Status: DISCONTINUED | OUTPATIENT
Start: 2024-10-05 | End: 2024-10-06 | Stop reason: HOSPADM

## 2024-10-05 RX ORDER — TRAMADOL HYDROCHLORIDE 50 MG/1
50 TABLET ORAL ONCE
Status: COMPLETED | OUTPATIENT
Start: 2024-10-05 | End: 2024-10-05

## 2024-10-05 RX ORDER — GUAIFENESIN/DEXTROMETHORPHAN 100-10MG/5
10 SYRUP ORAL EVERY 4 HOURS PRN
Status: DISCONTINUED | OUTPATIENT
Start: 2024-10-05 | End: 2024-10-06 | Stop reason: HOSPADM

## 2024-10-05 RX ORDER — SODIUM CHLORIDE 0.9 % (FLUSH) 0.9 %
10 SYRINGE (ML) INJECTION EVERY 12 HOURS SCHEDULED
Status: DISCONTINUED | OUTPATIENT
Start: 2024-10-05 | End: 2024-10-06 | Stop reason: HOSPADM

## 2024-10-05 RX ORDER — POTASSIUM CHLORIDE 1.5 G/1.58G
20 POWDER, FOR SOLUTION ORAL 2 TIMES DAILY
Status: DISCONTINUED | OUTPATIENT
Start: 2024-10-05 | End: 2024-10-06 | Stop reason: HOSPADM

## 2024-10-05 RX ORDER — POLYETHYLENE GLYCOL 3350 17 G/17G
17 POWDER, FOR SOLUTION ORAL DAILY PRN
Status: DISCONTINUED | OUTPATIENT
Start: 2024-10-05 | End: 2024-10-06 | Stop reason: HOSPADM

## 2024-10-05 RX ORDER — ROSUVASTATIN CALCIUM 5 MG/1
5 TABLET, COATED ORAL 3 TIMES WEEKLY
Status: DISCONTINUED | OUTPATIENT
Start: 2024-10-07 | End: 2024-10-06 | Stop reason: HOSPADM

## 2024-10-05 RX ORDER — BISACODYL 10 MG
10 SUPPOSITORY, RECTAL RECTAL DAILY PRN
Status: DISCONTINUED | OUTPATIENT
Start: 2024-10-05 | End: 2024-10-06 | Stop reason: HOSPADM

## 2024-10-05 RX ORDER — ACETAMINOPHEN 325 MG/1
650 TABLET ORAL EVERY 4 HOURS PRN
Status: DISCONTINUED | OUTPATIENT
Start: 2024-10-05 | End: 2024-10-06 | Stop reason: HOSPADM

## 2024-10-05 RX ORDER — TRAMADOL HYDROCHLORIDE 50 MG/1
50 TABLET ORAL EVERY 6 HOURS PRN
Status: DISCONTINUED | OUTPATIENT
Start: 2024-10-05 | End: 2024-10-05

## 2024-10-05 RX ORDER — NITROGLYCERIN 0.4 MG/1
0.4 TABLET SUBLINGUAL
Status: DISCONTINUED | OUTPATIENT
Start: 2024-10-05 | End: 2024-10-06 | Stop reason: HOSPADM

## 2024-10-05 RX ORDER — LOSARTAN POTASSIUM 50 MG/1
100 TABLET ORAL EVERY EVENING
Status: DISCONTINUED | OUTPATIENT
Start: 2024-10-05 | End: 2024-10-06 | Stop reason: HOSPADM

## 2024-10-05 RX ORDER — ACETAMINOPHEN 325 MG/1
650 TABLET ORAL EVERY 6 HOURS PRN
Status: DISCONTINUED | OUTPATIENT
Start: 2024-10-05 | End: 2024-10-05

## 2024-10-05 RX ORDER — AMOXICILLIN 250 MG
2 CAPSULE ORAL 2 TIMES DAILY PRN
Status: DISCONTINUED | OUTPATIENT
Start: 2024-10-05 | End: 2024-10-06 | Stop reason: HOSPADM

## 2024-10-05 RX ORDER — METOPROLOL SUCCINATE 25 MG/1
25 TABLET, EXTENDED RELEASE ORAL
Status: DISCONTINUED | OUTPATIENT
Start: 2024-10-05 | End: 2024-10-05

## 2024-10-05 RX ORDER — ONDANSETRON 2 MG/ML
4 INJECTION INTRAMUSCULAR; INTRAVENOUS EVERY 6 HOURS PRN
Status: DISCONTINUED | OUTPATIENT
Start: 2024-10-05 | End: 2024-10-06 | Stop reason: HOSPADM

## 2024-10-05 RX ORDER — BISACODYL 5 MG/1
5 TABLET, DELAYED RELEASE ORAL DAILY PRN
Status: DISCONTINUED | OUTPATIENT
Start: 2024-10-05 | End: 2024-10-06 | Stop reason: HOSPADM

## 2024-10-05 RX ADMIN — DEXTROMETHORPHAN HYDROBROMIDE, GUAIFENESIN 10 ML: 10; 100 LIQUID ORAL at 17:46

## 2024-10-05 RX ADMIN — POTASSIUM CHLORIDE 20 MEQ: 1.5 POWDER, FOR SOLUTION ORAL at 09:02

## 2024-10-05 RX ADMIN — APIXABAN 5 MG: 5 TABLET, FILM COATED ORAL at 09:02

## 2024-10-05 RX ADMIN — EMPAGLIFLOZIN 25 MG: 25 TABLET, FILM COATED ORAL at 09:02

## 2024-10-05 RX ADMIN — TRAMADOL HYDROCHLORIDE 50 MG: 50 TABLET ORAL at 03:31

## 2024-10-05 RX ADMIN — APIXABAN 5 MG: 5 TABLET, FILM COATED ORAL at 20:58

## 2024-10-05 RX ADMIN — POTASSIUM CHLORIDE 20 MEQ: 1.5 POWDER, FOR SOLUTION ORAL at 20:58

## 2024-10-05 RX ADMIN — LOSARTAN POTASSIUM 100 MG: 50 TABLET, FILM COATED ORAL at 17:46

## 2024-10-05 RX ADMIN — Medication 10 ML: at 21:09

## 2024-10-05 RX ADMIN — Medication 10 ML: at 09:02

## 2024-10-05 NOTE — CASE MANAGEMENT/SOCIAL WORK
Continued Stay Note  TARA Mendieta     Patient Name: Joi Cheng  MRN: [id number]  Today's Date: 10/5/2024    Admit Date: 10/4/2024        Discharge Plan       Row Name 10/05/24 1732       Plan    Plan Comments DC Barriers: Cardiac monitor, Cardiology following.                  Nova Escalante RN    Office: [phone]  Fax: [phone]

## 2024-10-05 NOTE — ED PROVIDER NOTES
Subjective   History of Present Illness  Patient is a 68-year-old female complaining of rapid heartbeat with palpitations and dizziness.  She states she been on a Holter monitor for the past 3 weeks due to similar complaints.  She states her cardiologist said she had several episodes of atrial fibrillation and is currently on Eliquis which she started yesterday.  She denies headache chest pain shortness of breath or other complaint      Review of Systems    Past Medical History:   Diagnosis Date    Abnormal ECG 6/2/2023    Arthritis     Rheumatoid    Diabetes mellitus     Disease of thyroid gland     Hyperlipidemia     Hypertension     PONV (postoperative nausea and vomiting)        Allergies   Allergen Reactions    Lisinopril Anaphylaxis       Past Surgical History:   Procedure Laterality Date    ABDOMINAL SURGERY      CHOLECYSTECTOMY      FINGER SURGERY      HYSTERECTOMY      INCISION AND DRAINAGE OF WOUND Right 04/06/2021    Procedure: Incision and drainage of abscess of right face/cheek;  Surgeon: Ariel Hernandes DO;  Location: [facility] MAIN OR;  Service: General;  Laterality: Right;    OOPHORECTOMY      THYROID SURGERY      TUBAL ABDOMINAL LIGATION      1 tube removed       Family History   Problem Relation Age of Onset    Diabetes Mother     Heart attack Mother     Heart disease Mother     Heart failure Mother     COPD Father        Social History     Socioeconomic History    Marital status:    Tobacco Use    Smoking status: Never    Smokeless tobacco: Never   Vaping Use    Vaping status: Never Used   Substance and Sexual Activity    Alcohol use: Not Currently     Comment: Rarely do I drink    Drug use: Never    Sexual activity: Not Currently     Partners: Male     Birth control/protection: Hysterectomy           Objective   Physical Exam  Neck has no adenopathy JVD or bruits.  Lungs are clear.  Heart has regular rate rhythm without murmur rub or gallop.  Chest is nontender.  Abdomen soft nontender.   Extremity exam unremarkable.  Procedures     My EKG interpretation shows normal sinus rhythm at a rate of 88 with no acute ST change      ED Course      Results for orders placed or performed during the hospital encounter of 10/04/24   Basic Metabolic Panel    Specimen: Blood   Result Value Ref Range    Glucose 141 (H) 65 - 99 mg/dL    BUN 19 8 - 23 mg/dL    Creatinine 1.07 (H) 0.57 - 1.00 mg/dL    Sodium 138 136 - 145 mmol/L    Potassium 3.4 (L) 3.5 - 5.2 mmol/L    Chloride 103 98 - 107 mmol/L    CO2 25.2 22.0 - 29.0 mmol/L    Calcium 8.9 8.6 - 10.5 mg/dL    BUN/Creatinine Ratio 17.8 7.0 - 25.0    Anion Gap 9.8 5.0 - 15.0 mmol/L    eGFR 57.0 (L) >60.0 mL/min/1.73   Magnesium    Specimen: Blood   Result Value Ref Range    Magnesium 2.1 1.6 - 2.4 mg/dL   TSH    Specimen: Blood   Result Value Ref Range    TSH 0.081 (L) 0.270 - 4.200 uIU/mL   CBC Auto Differential    Specimen: Blood   Result Value Ref Range    WBC 7.27 3.40 - 10.80 10*3/mm3    RBC 4.58 3.77 - 5.28 10*6/mm3    Hemoglobin 13.7 12.0 - 15.9 g/dL    Hematocrit 43.1 34.0 - 46.6 %    MCV 94.1 79.0 - 97.0 fL    MCH 29.9 26.6 - 33.0 pg    MCHC 31.8 31.5 - 35.7 g/dL    RDW 13.0 12.3 - 15.4 %    RDW-SD 44.5 37.0 - 54.0 fl    MPV 11.5 6.0 - 12.0 fL    Platelets 166 140 - 450 10*3/mm3    Neutrophil % 74.0 42.7 - 76.0 %    Lymphocyte % 11.7 (L) 19.6 - 45.3 %    Monocyte % 12.7 (H) 5.0 - 12.0 %    Eosinophil % 0.4 0.3 - 6.2 %    Basophil % 0.6 0.0 - 1.5 %    Immature Grans % 0.6 (H) 0.0 - 0.5 %    Neutrophils, Absolute 5.39 1.70 - 7.00 10*3/mm3    Lymphocytes, Absolute 0.85 0.70 - 3.10 10*3/mm3    Monocytes, Absolute 0.92 (H) 0.10 - 0.90 10*3/mm3    Eosinophils, Absolute 0.03 0.00 - 0.40 10*3/mm3    Basophils, Absolute 0.04 0.00 - 0.20 10*3/mm3    Immature Grans, Absolute 0.04 0.00 - 0.05 10*3/mm3    nRBC 0.0 0.0 - 0.2 /100 WBC   ECG 12 Lead Tachycardia   Result Value Ref Range    QT Interval 389 ms    QTC Interval 497 ms   Gold Top - [facility]   Result Value Ref Range     Extra Tube Hold for add-ons.    Light Blue Top   Result Value Ref Range    Extra Tube Hold for add-ons.      No radiology results for the last day                                         Medical Decision Making  BMP shows no renal insufficiency or electrolyte abnormality.  Magnesium 2.1.  TSH is 0.081.  Patient with no leukocytosis no left shift and no anemia.  She may normal sinus rhythm throughout her ED visit.  Patient will be admitted for cardiac monitoring.  I did speak to the patient's family physician.    Amount and/or Complexity of Data Reviewed  Labs: ordered. Decision-making details documented in ED Course.  ECG/medicine tests: ordered and independent interpretation performed.    Risk  Prescription drug management.  Decision regarding hospitalization.        Final diagnoses:   Palpitations       ED Disposition  ED Disposition       ED Disposition   Decision to Admit    Condition   --    Comment   --               No follow-up provider specified.       Medication List      No changes were made to your prescriptions during this visit.            Ozzy Bass MD  10/04/24 8268

## 2024-10-05 NOTE — PLAN OF CARE
Goal Outcome Evaluation:              Outcome Evaluation: Pt has been resting comfortably throughout the shift. She is alert and oriented and on room air. She is up ad sunshine and tolerates acitivty well. She has no complaints and is stable at this time.

## 2024-10-05 NOTE — H&P
Patient Care Team:  Ariel Rider MD as PCP - General (Family Medicine)  Sam Chao MD as Consulting Physician (Cardiology)    Chief complaint dizziness, lightheaded    Subjective     Patient is a 68 y.o. female with recent admission for syncope who presents with episodes of feeling dizzy, lightheaded and presyncopal.  She was admitted to this facility in September where she was noted to be bradycardic.  Bradycardia resolved with discontinuation of metoprolol.  She was sent home with a Holter monitor.  She received a call on 10/3/2024 with information that she is having intermittent episodes of atrial fibs.  She was started on anticoagulation with plans for outpatient follow-up.  Yesterday she started having worsening symptoms of palpitations and presyncope with heart rates noted as high as 140 at home.    Review of Systems   Constitutional:  Positive for activity change. Negative for diaphoresis, fatigue and fever.   HENT:  Negative for facial swelling.    Eyes:  Negative for visual disturbance.   Respiratory:  Negative for cough, shortness of breath and stridor.    Cardiovascular:  Positive for palpitations. Negative for chest pain and leg swelling.   Gastrointestinal:  Negative for diarrhea and nausea.   Musculoskeletal:  Negative for arthralgias, back pain and gait problem.   Neurological:  Positive for dizziness and light-headedness. Negative for tremors and weakness.   Psychiatric/Behavioral:  Negative for confusion.           History  Past Medical History:   Diagnosis Date    Abnormal ECG 6/2/2023    Arthritis     Rheumatoid    Diabetes mellitus     Disease of thyroid gland     Hyperlipidemia     Hypertension     New onset atrial fibrillation 10/5/2024    PONV (postoperative nausea and vomiting)      Past Surgical History:   Procedure Laterality Date    ABDOMINAL SURGERY      CHOLECYSTECTOMY      FINGER SURGERY      HYSTERECTOMY      INCISION AND DRAINAGE OF WOUND Right 04/06/2021    Procedure:  Incision and drainage of abscess of right face/cheek;  Surgeon: Ariel Hernandes DO;  Location: [facility] MAIN OR;  Service: General;  Laterality: Right;    NECK SURGERY      April 2024    OOPHORECTOMY      THYROID SURGERY      TUBAL ABDOMINAL LIGATION      1 tube removed     Family History   Problem Relation Age of Onset    Diabetes Mother     Heart attack Mother     Heart disease Mother     Heart failure Mother     COPD Father      Social History     Tobacco Use    Smoking status: Never    Smokeless tobacco: Never   Vaping Use    Vaping status: Never Used   Substance Use Topics    Alcohol use: Not Currently     Comment: Rarely do I drink    Drug use: Never     Medications Prior to Admission   Medication Sig Dispense Refill Last Dose    amLODIPine (NORVASC) 5 MG tablet Take 1 tablet by mouth Daily.   10/4/2024 at 0800    apixaban (ELIQUIS) 5 MG tablet tablet Take 1 tablet by mouth 2 (Two) Times a Day. 56 tablet 0 10/4/2024 at 0800    dapagliflozin Propanediol (Farxiga) 10 MG tablet Take 10 mg by mouth Daily.   10/4/2024 at 0800    hydrALAZINE (APRESOLINE) 50 MG tablet Take 1 tablet by mouth 2 (Two) Times a Day.   10/4/2024 at 0800    levothyroxine (SYNTHROID, LEVOTHROID) 100 MCG tablet Take 1 tablet by mouth Daily.   10/4/2024 at 0800    losartan (COZAAR) 100 MG tablet Take 1 tablet by mouth Every Evening.   10/3/2024    potassium chloride ER (K-TAB) 20 MEQ tablet controlled-release ER tablet Take 1 tablet by mouth 2 (Two) Times a Day.   10/4/2024 at 0800    rosuvastatin (CRESTOR) 5 MG tablet Take 1 tablet by mouth 3 (Three) Times a Week. Monday, Wednesday and Friday   10/4/2024     Allergies:  Lisinopril    Objective     Vital Signs  Temp:  [98.3 °F (36.8 °C)-98.7 °F (37.1 °C)] 98.3 °F (36.8 °C)  Heart Rate:  [] 97  Resp:  [14-18] 14  BP: (134-169)/(67-93) 138/73     Physical Exam:      General Appearance:    Alert, cooperative, in no acute distress   Head:    Normocephalic, without obvious abnormality,  atraumatic   Eyes:            Lids and lashes normal, conjunctivae and sclerae normal, no   icterus, no pallor, corneas clear, PERRLA   Ears:    Ears appear intact with no abnormalities noted   Throat:   No oral lesions, no thrush, oral mucosa moist   Neck:   No adenopathy, supple, trachea midline, no thyromegaly, no   carotid bruit, no JVD   Lungs:     Clear to auscultation,respirations regular, even and                  unlabored    Heart:    Regular rhythm and normal rate, normal S1 and S2, no            murmur, no gallop, no rub, no click   Chest Wall:    No abnormalities observed   Abdomen:     Normal bowel sounds, no masses, no organomegaly, soft        non-tender, non-distended, no guarding, no rebound                tenderness   Extremities:   Moves all extremities well, no edema, no cyanosis, no             redness   Pulses:   Pulses palpable and equal bilaterally   Skin:   No bleeding, bruising or rash   Lymph nodes:   No palpable adenopathy   Neurologic:   Cranial nerves 2 - 12 grossly intact, sensation intact, DTR       present and equal bilaterally       Results Review:     Imaging Results (Last 24 Hours)       ** No results found for the last 24 hours. **             Lab Results (last 24 hours)       Procedure Component Value Units Date/Time    T4, Free [[id number]]  (Abnormal) Collected: 10/04/24 2029    Specimen: Blood Updated: 10/05/24 0650     Free T4 1.73 ng/dL     Basic Metabolic Panel [[id number]]  (Abnormal) Collected: 10/04/24 2029    Specimen: Blood Updated: 10/04/24 2115     Glucose 141 mg/dL      BUN 19 mg/dL      Creatinine 1.07 mg/dL      Sodium 138 mmol/L      Potassium 3.4 mmol/L      Comment: Specimen hemolyzed.  Result may be falsely elevated.        Chloride 103 mmol/L      CO2 25.2 mmol/L      Calcium 8.9 mg/dL      BUN/Creatinine Ratio 17.8     Anion Gap 9.8 mmol/L      eGFR 57.0 mL/min/1.73     Narrative:      GFR Normal >60  Chronic Kidney Disease <60  Kidney Failure <15       Magnesium [[id number]]  (Normal) Collected: 10/04/24 2029    Specimen: Blood Updated: 10/04/24 2115     Magnesium 2.1 mg/dL     TSH [[id number]]  (Abnormal) Collected: 10/04/24 2029    Specimen: Blood Updated: 10/04/24 2114     TSH 0.081 uIU/mL     Extra Tubes [[id number]] Collected: 10/04/24 2029    Specimen: Blood, Venous Line Updated: 10/04/24 2045    Narrative:      The following orders were created for panel order Extra Tubes.  Procedure                               Abnormality         Status                     ---------                               -----------         ------                     Gold Top - SST[[id number]]                                   Final result               Light Blue Top[[id number]]                                   Final result                 Please view results for these tests on the individual orders.    Gold Top - SST [[id number]] Collected: 10/04/24 2029    Specimen: Blood Updated: 10/04/24 2045     Extra Tube Hold for add-ons.     Comment: Auto resulted.       Light Blue Top [[id number]] Collected: 10/04/24 2029    Specimen: Blood Updated: 10/04/24 2045     Extra Tube Hold for add-ons.     Comment: Auto resulted       CBC & Differential [[id number]]  (Abnormal) Collected: 10/04/24 2029    Specimen: Blood Updated: 10/04/24 2039    Narrative:      The following orders were created for panel order CBC & Differential.  Procedure                               Abnormality         Status                     ---------                               -----------         ------                     CBC Auto Differential[[id number]]        Abnormal            Final result                 Please view results for these tests on the individual orders.    CBC Auto Differential [[id number]]  (Abnormal) Collected: 10/04/24 2029    Specimen: Blood Updated: 10/04/24 2039     WBC 7.27 10*3/mm3      RBC 4.58 10*6/mm3      Hemoglobin 13.7 g/dL      Hematocrit 43.1 %      MCV 94.1 fL      MCH 29.9 pg      MCHC  31.8 g/dL      RDW 13.0 %      RDW-SD 44.5 fl      MPV 11.5 fL      Platelets 166 10*3/mm3      Neutrophil % 74.0 %      Lymphocyte % 11.7 %      Monocyte % 12.7 %      Eosinophil % 0.4 %      Basophil % 0.6 %      Immature Grans % 0.6 %      Neutrophils, Absolute 5.39 10*3/mm3      Lymphocytes, Absolute 0.85 10*3/mm3      Monocytes, Absolute 0.92 10*3/mm3      Eosinophils, Absolute 0.03 10*3/mm3      Basophils, Absolute 0.04 10*3/mm3      Immature Grans, Absolute 0.04 10*3/mm3      nRBC 0.0 /100 WBC              I reviewed the patient's new clinical results.    Assessment & Plan       New onset atrial fibrillation  -Currently in sinus rhythm.  She was bradycardic and syncopal when she was on metoprolol.  Will continue anticoagulation for now and await cardiology input.  She may need combination of medication therapy with pacemaker.    Hypothyroidism-TSH is low and free T4 is high.  Holding levothyroxine for now.  Anticipate discharging home on lower dose than previously.    Essential hypertension-continue losartan  Mixed hyperlipidemia-statin  Hypokalemia-replacing  Type 2 diabetes-continue Jardiance      I discussed the patient's findings and my recommendations with patient.     Altagracia King MD  10/05/24  07:45 EDT

## 2024-10-05 NOTE — CONSULTS
CARDIOLOGY CONSULT:    Joi Cheng  1955  female  [id number]      Referring Provider: Dr. King  Reason for Consultation: Patient is in atrial fibrillation    Patient Care Team:  Ariel Rider MD as PCP - General (Family Medicine)  Sam Chao MD as Consulting Physician (Cardiology)    Chief complaint patient's    Subjective .     History of present illness:  Joi Cheng is a 68 y.o. female with history of diabetes hypertension hyperlipidemia presented to the hospital complaints of palpitations and dizziness.  Patient was in the hospital recently with similar symptoms.  A Holter monitor.  Patient was having episodes of atrial fibrillation hence she was informed and she presented to the hospital.  Patient also was having bradycardia which was resolved with discontinuation of metoprolol.  Patient does not have any chest pain or shortness of breath.  No PND or orthopnea.  No syncope or swelling of the feet.    Review of Systems   Constitutional: Negative for fever and malaise/fatigue.   HENT:  Negative for ear pain and nosebleeds.    Eyes:  Negative for blurred vision and double vision.   Cardiovascular:  Positive for palpitations. Negative for chest pain and dyspnea on exertion.   Respiratory:  Negative for cough and shortness of breath.    Skin:  Negative for rash.   Musculoskeletal:  Negative for joint pain.   Gastrointestinal:  Negative for abdominal pain, nausea and vomiting.   Neurological:  Negative for focal weakness and headaches.   Psychiatric/Behavioral:  Negative for depression. The patient is not nervous/anxious.    All other systems reviewed and are negative.      History  Past Medical History:   Diagnosis Date    Abnormal ECG 6/2/2023    Arthritis     Rheumatoid    Diabetes mellitus     Disease of thyroid gland     Hyperlipidemia     Hypertension     New onset atrial fibrillation 10/5/2024    PONV (postoperative nausea and vomiting)        Past Surgical History:   Procedure  Laterality Date    ABDOMINAL SURGERY      CHOLECYSTECTOMY      FINGER SURGERY      HYSTERECTOMY      INCISION AND DRAINAGE OF WOUND Right 04/06/2021    Procedure: Incision and drainage of abscess of right face/cheek;  Surgeon: Ariel Hernandes DO;  Location: [facility] MAIN OR;  Service: General;  Laterality: Right;    NECK SURGERY      April 2024    OOPHORECTOMY      THYROID SURGERY      TUBAL ABDOMINAL LIGATION      1 tube removed       Family History   Problem Relation Age of Onset    Diabetes Mother     Heart attack Mother     Heart disease Mother     Heart failure Mother     COPD Father        Social History     Tobacco Use    Smoking status: Never    Smokeless tobacco: Never   Vaping Use    Vaping status: Never Used   Substance Use Topics    Alcohol use: Not Currently     Comment: Rarely do I drink    Drug use: Never        Medications Prior to Admission   Medication Sig Dispense Refill Last Dose    amLODIPine (NORVASC) 5 MG tablet Take 1 tablet by mouth Daily.   10/4/2024 at 0800    apixaban (ELIQUIS) 5 MG tablet tablet Take 1 tablet by mouth 2 (Two) Times a Day. 56 tablet 0 10/4/2024 at 0800    dapagliflozin Propanediol (Farxiga) 10 MG tablet Take 10 mg by mouth Daily.   10/4/2024 at 0800    hydrALAZINE (APRESOLINE) 50 MG tablet Take 1 tablet by mouth 2 (Two) Times a Day.   10/4/2024 at 0800    levothyroxine (SYNTHROID, LEVOTHROID) 100 MCG tablet Take 1 tablet by mouth Daily.   10/4/2024 at 0800    losartan (COZAAR) 100 MG tablet Take 1 tablet by mouth Every Evening.   10/3/2024    potassium chloride ER (K-TAB) 20 MEQ tablet controlled-release ER tablet Take 1 tablet by mouth 2 (Two) Times a Day.   10/4/2024 at 0800    rosuvastatin (CRESTOR) 5 MG tablet Take 1 tablet by mouth 3 (Three) Times a Week. Monday, Wednesday and Friday   10/4/2024       Allergies: Lisinopril    Scheduled Meds:apixaban, 5 mg, Oral, BID  empagliflozin, 25 mg, Oral, Daily  losartan, 100 mg, Oral, Q PM  potassium chloride, 20 mEq, Oral,  "BID  [START ON 10/7/2024] rosuvastatin, 5 mg, Oral, Once per day on Monday Wednesday Friday  sodium chloride, 10 mL, Intravenous, Q12H      Continuous Infusions:   PRN Meds:.  acetaminophen    senna-docusate sodium **AND** polyethylene glycol **AND** bisacodyl **AND** bisacodyl    Calcium Replacement - Follow Nurse / BPA Driven Protocol    Magnesium Standard Dose Replacement - Follow Nurse / BPA Driven Protocol    nitroglycerin    ondansetron    Phosphorus Replacement - Follow Nurse / BPA Driven Protocol    Potassium Replacement - Follow Nurse / BPA Driven Protocol    Insert Peripheral IV **AND** sodium chloride    sodium chloride    Objective     VITAL SIGNS  Vitals:    10/04/24 2201 10/04/24 2301 10/05/24 0247 10/05/24 1129   BP: 134/67 143/67 138/73 128/68   BP Location:   Left arm Left arm   Patient Position:   Lying Lying   Pulse: 89 87 97 77   Resp:   14 15   Temp:   98.3 °F (36.8 °C) 98.8 °F (37.1 °C)   TempSrc:   Oral Oral   SpO2: 94% 95% 95% 97%   Weight:       Height:           Flowsheet Rows      Flowsheet Row First Filed Value   Admission Height 165.1 cm (65\") Documented at 10/04/2024 1915   Admission Weight 78.2 kg (172 lb 6.4 oz) Documented at 10/04/2024 1915             TELEMETRY: Sinus rhythm with intermittent episodes of atrial fibrillation    Physical Exam:  Constitutional:       Appearance: Well-developed.   Eyes:      General: No scleral icterus.     Conjunctiva/sclera: Conjunctivae normal.      Pupils: Pupils are equal, round, and reactive to light.   HENT:      Head: Normocephalic and atraumatic.   Neck:      Vascular: No carotid bruit or JVD.   Pulmonary:      Effort: Pulmonary effort is normal.      Breath sounds: Normal breath sounds. No wheezing. No rales.   Cardiovascular:      Normal rate. Regular rhythm.   Pulses:     Intact distal pulses.   Abdominal:      General: Bowel sounds are normal.      Palpations: Abdomen is soft.   Musculoskeletal: Normal range of motion.      Cervical back: " Normal range of motion and neck supple. Skin:     General: Skin is warm and dry.      Findings: No rash.   Neurological:      Mental Status: Alert.      Comments: No focal deficits          Results Review:   I reviewed the patient's new clinical results.  Lab Results (last 24 hours)       Procedure Component Value Units Date/Time    T4, Free [[id number]]  (Abnormal) Collected: 10/04/24 2029    Specimen: Blood Updated: 10/05/24 0650     Free T4 1.73 ng/dL     Basic Metabolic Panel [[id number]]  (Abnormal) Collected: 10/04/24 2029    Specimen: Blood Updated: 10/04/24 2115     Glucose 141 mg/dL      BUN 19 mg/dL      Creatinine 1.07 mg/dL      Sodium 138 mmol/L      Potassium 3.4 mmol/L      Comment: Specimen hemolyzed.  Result may be falsely elevated.        Chloride 103 mmol/L      CO2 25.2 mmol/L      Calcium 8.9 mg/dL      BUN/Creatinine Ratio 17.8     Anion Gap 9.8 mmol/L      eGFR 57.0 mL/min/1.73     Narrative:      GFR Normal >60  Chronic Kidney Disease <60  Kidney Failure <15      Magnesium [[id number]]  (Normal) Collected: 10/04/24 2029    Specimen: Blood Updated: 10/04/24 2115     Magnesium 2.1 mg/dL     TSH [[id number]]  (Abnormal) Collected: 10/04/24 2029    Specimen: Blood Updated: 10/04/24 2114     TSH 0.081 uIU/mL     Extra Tubes [[id number]] Collected: 10/04/24 2029    Specimen: Blood, Venous Line Updated: 10/04/24 2045    Narrative:      The following orders were created for panel order Extra Tubes.  Procedure                               Abnormality         Status                     ---------                               -----------         ------                     Gold Top - SST[[id number]]                                   Final result               Light Blue Top[[id number]]                                   Final result                 Please view results for these tests on the individual orders.    Gold Top - SST [[id number]] Collected: 10/04/24 2029    Specimen: Blood Updated: 10/04/24 2045      Extra Tube Hold for add-ons.     Comment: Auto resulted.       Light Blue Top [[id number]] Collected: 10/04/24 2029    Specimen: Blood Updated: 10/04/24 2045     Extra Tube Hold for add-ons.     Comment: Auto resulted       CBC & Differential [[id number]]  (Abnormal) Collected: 10/04/24 2029    Specimen: Blood Updated: 10/04/24 2039    Narrative:      The following orders were created for panel order CBC & Differential.  Procedure                               Abnormality         Status                     ---------                               -----------         ------                     CBC Auto Differential[[id number]]        Abnormal            Final result                 Please view results for these tests on the individual orders.    CBC Auto Differential [[id number]]  (Abnormal) Collected: 10/04/24 2029    Specimen: Blood Updated: 10/04/24 2039     WBC 7.27 10*3/mm3      RBC 4.58 10*6/mm3      Hemoglobin 13.7 g/dL      Hematocrit 43.1 %      MCV 94.1 fL      MCH 29.9 pg      MCHC 31.8 g/dL      RDW 13.0 %      RDW-SD 44.5 fl      MPV 11.5 fL      Platelets 166 10*3/mm3      Neutrophil % 74.0 %      Lymphocyte % 11.7 %      Monocyte % 12.7 %      Eosinophil % 0.4 %      Basophil % 0.6 %      Immature Grans % 0.6 %      Neutrophils, Absolute 5.39 10*3/mm3      Lymphocytes, Absolute 0.85 10*3/mm3      Monocytes, Absolute 0.92 10*3/mm3      Eosinophils, Absolute 0.03 10*3/mm3      Basophils, Absolute 0.04 10*3/mm3      Immature Grans, Absolute 0.04 10*3/mm3      nRBC 0.0 /100 WBC             Imaging Results (Last 24 Hours)       ** No results found for the last 24 hours. **            EKG      I personally viewed and interpreted the patient's EKG/Telemetry data:    ECHOCARDIOGRAM:  Results for orders placed during the hospital encounter of 07/05/23    Adult Transthoracic Echo Complete W/ Cont if Necessary Per Protocol    Interpretation Summary    Left ventricular ejection fraction appears to be 66 - 70%.     Estimated right ventricular systolic pressure from tricuspid regurgitation is normal (<35 mmHg).         STRESS MYOVIEW:  Results for orders placed during the hospital encounter of 07/05/23    Stress Test With Myocardial Perfusion One Day    Interpretation Summary    Left ventricular ejection fraction is hyperdynamic (Calculated EF > 70%).    Myocardial perfusion imaging indicates a normal myocardial perfusion study with no evidence of ischemia.    Impressions are consistent with a low risk study.       CARDIAC CATHETERIZATION:    No results found for this or any previous visit.       OTHER:         MDM      Atrial fibrillation  Patient has new onset and paroxysmal fibrillation  Patient had bradycardia and syncope and hence metoprolol was stopped  Patient's heart rate is better and he is in sinus rhythm and hence will encourage anticoagulation with either Eliquis or Xarelto    Hypertension  Patient blood pressure currently stable on losartan    Hyperlipidemia  Patient is on statins    Diabetes  Patient is on oral medicines.    Hypothyroidism  Patient is on levothyroxine and her T4 was high which could be causing his A-fib to and hence will recommend adjustment of dose only.    I discussed the patients findings and my recommendations with patient and nurse    Sam Chao MD  10/05/24  12:20 EDT

## 2024-10-06 VITALS
BODY MASS INDEX: 28.72 KG/M2 | RESPIRATION RATE: 16 BRPM | WEIGHT: 172.4 LBS | SYSTOLIC BLOOD PRESSURE: 157 MMHG | OXYGEN SATURATION: 95 % | TEMPERATURE: 98.3 F | HEART RATE: 82 BPM | DIASTOLIC BLOOD PRESSURE: 91 MMHG | HEIGHT: 65 IN

## 2024-10-06 PROBLEM — D89.831 CYTOKINE RELEASE SYNDROME, GRADE 1: Status: ACTIVE | Noted: 2024-10-06

## 2024-10-06 PROBLEM — R00.2 PALPITATIONS: Status: ACTIVE | Noted: 2024-10-06

## 2024-10-06 LAB
ANION GAP SERPL CALCULATED.3IONS-SCNC: 10.6 MMOL/L (ref 5–15)
BUN SERPL-MCNC: 17 MG/DL (ref 8–23)
BUN/CREAT SERPL: 18.1 (ref 7–25)
CALCIUM SPEC-SCNC: 8.7 MG/DL (ref 8.6–10.5)
CHLORIDE SERPL-SCNC: 104 MMOL/L (ref 98–107)
CO2 SERPL-SCNC: 26.4 MMOL/L (ref 22–29)
CREAT SERPL-MCNC: 0.94 MG/DL (ref 0.57–1)
EGFRCR SERPLBLD CKD-EPI 2021: 66.6 ML/MIN/1.73
GLUCOSE SERPL-MCNC: 107 MG/DL (ref 65–99)
MAGNESIUM SERPL-MCNC: 2.2 MG/DL (ref 1.6–2.4)
POTASSIUM SERPL-SCNC: 3.8 MMOL/L (ref 3.5–5.2)
SODIUM SERPL-SCNC: 141 MMOL/L (ref 136–145)

## 2024-10-06 PROCEDURE — 83735 ASSAY OF MAGNESIUM: CPT | Performed by: INTERNAL MEDICINE

## 2024-10-06 PROCEDURE — 99214 OFFICE O/P EST MOD 30 MIN: CPT | Performed by: INTERNAL MEDICINE

## 2024-10-06 PROCEDURE — G0378 HOSPITAL OBSERVATION PER HR: HCPCS

## 2024-10-06 PROCEDURE — 80048 BASIC METABOLIC PNL TOTAL CA: CPT | Performed by: INTERNAL MEDICINE

## 2024-10-06 RX ORDER — LEVOTHYROXINE SODIUM 100 UG/1
TABLET ORAL
Start: 2024-10-06

## 2024-10-06 RX ORDER — ACETAMINOPHEN 325 MG/1
650 TABLET ORAL EVERY 4 HOURS PRN
Start: 2024-10-06

## 2024-10-06 RX ORDER — GUAIFENESIN/DEXTROMETHORPHAN 100-10MG/5
10 SYRUP ORAL EVERY 4 HOURS PRN
Start: 2024-10-06

## 2024-10-06 RX ADMIN — APIXABAN 5 MG: 5 TABLET, FILM COATED ORAL at 08:49

## 2024-10-06 RX ADMIN — POTASSIUM CHLORIDE 20 MEQ: 1.5 POWDER, FOR SOLUTION ORAL at 08:49

## 2024-10-06 RX ADMIN — Medication 10 ML: at 08:49

## 2024-10-06 RX ADMIN — EMPAGLIFLOZIN 25 MG: 25 TABLET, FILM COATED ORAL at 08:49

## 2024-10-06 RX ADMIN — DEXTROMETHORPHAN HYDROBROMIDE, GUAIFENESIN 10 ML: 10; 100 LIQUID ORAL at 05:03

## 2024-10-06 NOTE — DISCHARGE SUMMARY
Date of Discharge:  10/6/2024    Discharge Diagnosis:   **New onset atrial fibrillation [I48.91]   Cytokine release syndrome, grade 1 [D89.831]   Palpitations [R00.2]   COVID-19 virus infection [U07.1]   Rheumatoid arthritis [M06.9]   Essential hypertension [I10]   Hypothyroidism (acquired) [E03.9]       Presenting Problem/History of Present Illness  Active Hospital Problems    Diagnosis  POA    **New onset atrial fibrillation [I48.91]  Yes    Cytokine release syndrome, grade 1 [D89.831]  No    Palpitations [R00.2]  Yes    COVID-19 virus infection [U07.1]  Yes    Rheumatoid arthritis [M06.9]  Yes    Essential hypertension [I10]  Yes    Hypothyroidism (acquired) [E03.9]  Yes      Resolved Hospital Problems   No resolved problems to display.          Hospital Course  Patient is a 68 y.o. female presented with palpitations and general weakness with headache.  She was hospitalized in September for syncope and had been discharged home with a cardiac monitor.  Outpatient monitor had showed intermittent episodes of atrial fibs with heart rates in the 130s.  She had been started on Eliquis as an outpatient.  On the day of admission she felt weak intermittently lightheaded and noted some palpitations so she presented to the emergency room.  She was in sinus rhythm on admission.  TSH was low and free T4 was high.  Levothyroxine was held and she was continued on a cardiac monitor.  She developed nasal congestion and nonproductive cough and was found to be COVID-positive.  She remains in sinus rhythm.  COVID symptoms are improving.  Plan is for her to discharge home with her cardiac monitor.  She will continue anticoagulation.  She has follow-up within 10 days with her cardiologist, who can review her heart monitor and decide if further intervention is warranted.  She does not tolerate beta-blockers due to bradycardia.    Symptomatic management of mild COVID infection was reviewed.  She will hold levothyroxine 1 day a week.   She has follow-up appointment scheduled in November with her PCP, including scheduled labs to check thyroid function.    Procedures Performed         Consults:   Consults       Date and Time Order Name Status Description    10/5/2024  6:12 AM Inpatient Cardiology Consult Completed     10/5/2024  6:08 AM Inpatient Cardiology Consult      10/4/2024  9:19 PM Family Medicine Consult              Pertinent Test Results:    Lab Results (most recent)       Procedure Component Value Units Date/Time    Magnesium [[id number]]  (Normal) Collected: 10/06/24 0450    Specimen: Blood Updated: 10/06/24 0614     Magnesium 2.2 mg/dL     Basic Metabolic Panel [[id number]]  (Abnormal) Collected: 10/06/24 0450    Specimen: Blood Updated: 10/06/24 0614     Glucose 107 mg/dL      BUN 17 mg/dL      Creatinine 0.94 mg/dL      Sodium 141 mmol/L      Potassium 3.8 mmol/L      Comment: Specimen hemolyzed.  Result may be falsely elevated.        Chloride 104 mmol/L      CO2 26.4 mmol/L      Calcium 8.7 mg/dL      BUN/Creatinine Ratio 18.1     Anion Gap 10.6 mmol/L      eGFR 66.6 mL/min/1.73     Narrative:      GFR Normal >60  Chronic Kidney Disease <60  Kidney Failure <15      Respiratory Panel PCR w/COVID-19(SARS-CoV-2) [facility] In-House, NP Swab in UTM/VTM, 2 HR TAT - Swab, Nasopharynx [[id number]]  (Abnormal) Collected: 10/05/24 1751    Specimen: Swab from Nasopharynx Updated: 10/05/24 1900     ADENOVIRUS, PCR Not Detected     Coronavirus 229E Not Detected     Coronavirus HKU1 Not Detected     Coronavirus NL63 Not Detected     Coronavirus OC43 Not Detected     COVID19 Detected     Human Metapneumovirus Not Detected     Human Rhinovirus/Enterovirus Not Detected     Influenza A PCR Not Detected     Influenza B PCR Not Detected     Parainfluenza Virus 1 Not Detected     Parainfluenza Virus 2 Not Detected     Parainfluenza Virus 3 Not Detected     Parainfluenza Virus 4 Not Detected     RSV, PCR Not Detected     Bordetella pertussis  pcr Not Detected     Bordetella parapertussis PCR Not Detected     Chlamydophila pneumoniae PCR Not Detected     Mycoplasma pneumo by PCR Not Detected    Narrative:      In the setting of a positive respiratory panel with a viral infection PLUS a negative procalcitonin without other underlying concern for bacterial infection, consider observing off antibiotics or discontinuation of antibiotics and continue supportive care. If the respiratory panel is positive for atypical bacterial infection (Bordetella pertussis, Chlamydophila pneumoniae, or Mycoplasma pneumoniae), consider antibiotic de-escalation to target atypical bacterial infection.    T4, Free [[id number]]  (Abnormal) Collected: 10/04/24 2029    Specimen: Blood Updated: 10/05/24 0650     Free T4 1.73 ng/dL     Basic Metabolic Panel [[id number]]  (Abnormal) Collected: 10/04/24 2029    Specimen: Blood Updated: 10/04/24 2115     Glucose 141 mg/dL      BUN 19 mg/dL      Creatinine 1.07 mg/dL      Sodium 138 mmol/L      Potassium 3.4 mmol/L      Comment: Specimen hemolyzed.  Result may be falsely elevated.        Chloride 103 mmol/L      CO2 25.2 mmol/L      Calcium 8.9 mg/dL      BUN/Creatinine Ratio 17.8     Anion Gap 9.8 mmol/L      eGFR 57.0 mL/min/1.73     Narrative:      GFR Normal >60  Chronic Kidney Disease <60  Kidney Failure <15      Magnesium [[id number]]  (Normal) Collected: 10/04/24 2029    Specimen: Blood Updated: 10/04/24 2115     Magnesium 2.1 mg/dL     TSH [[id number]]  (Abnormal) Collected: 10/04/24 2029    Specimen: Blood Updated: 10/04/24 2114     TSH 0.081 uIU/mL     Extra Tubes [[id number]] Collected: 10/04/24 2029    Specimen: Blood, Venous Line Updated: 10/04/24 2045    Narrative:      The following orders were created for panel order Extra Tubes.  Procedure                               Abnormality         Status                     ---------                               -----------         ------                     Gold Top - [facility] [[email or web address]][[id number]]                                    Final result               Light Blue Top[[id number]]                                   Final result                 Please view results for these tests on the individual orders.    Gold Top - SST [[id number]] Collected: 10/04/24 2029    Specimen: Blood Updated: 10/04/24 2045     Extra Tube Hold for add-ons.     Comment: Auto resulted.       Light Blue Top [[id number]] Collected: 10/04/24 2029    Specimen: Blood Updated: 10/04/24 2045     Extra Tube Hold for add-ons.     Comment: Auto resulted       CBC & Differential [[id number]]  (Abnormal) Collected: 10/04/24 2029    Specimen: Blood Updated: 10/04/24 2039    Narrative:      The following orders were created for panel order CBC & Differential.  Procedure                               Abnormality         Status                     ---------                               -----------         ------                     CBC Auto Differential[[id number]]        Abnormal            Final result                 Please view results for these tests on the individual orders.    CBC Auto Differential [[id number]]  (Abnormal) Collected: 10/04/24 2029    Specimen: Blood Updated: 10/04/24 2039     WBC 7.27 10*3/mm3      RBC 4.58 10*6/mm3      Hemoglobin 13.7 g/dL      Hematocrit 43.1 %      MCV 94.1 fL      MCH 29.9 pg      MCHC 31.8 g/dL      RDW 13.0 %      RDW-SD 44.5 fl      MPV 11.5 fL      Platelets 166 10*3/mm3      Neutrophil % 74.0 %      Lymphocyte % 11.7 %      Monocyte % 12.7 %      Eosinophil % 0.4 %      Basophil % 0.6 %      Immature Grans % 0.6 %      Neutrophils, Absolute 5.39 10*3/mm3      Lymphocytes, Absolute 0.85 10*3/mm3      Monocytes, Absolute 0.92 10*3/mm3      Eosinophils, Absolute 0.03 10*3/mm3      Basophils, Absolute 0.04 10*3/mm3      Immature Grans, Absolute 0.04 10*3/mm3      nRBC 0.0 /100 WBC              Results for orders placed during the hospital encounter of 07/05/23    Adult Transthoracic Echo Complete W/ Cont if  Necessary Per Protocol    Interpretation Summary    Left ventricular ejection fraction appears to be 66 - 70%.    Estimated right ventricular systolic pressure from tricuspid regurgitation is normal (<35 mmHg).              Condition on Discharge: Improved, stable    Vital Signs  Temp:  [97.9 °F (36.6 °C)-98.5 °F (36.9 °C)] 98.3 °F (36.8 °C)  Heart Rate:  [76-91] 82  Resp:  [15-18] 16  BP: (141-157)/(72-91) 157/91    Physical Exam:     General Appearance:    Alert, cooperative, in no acute distress   Head:    Normocephalic, without obvious abnormality, atraumatic   Eyes:            Lids and lashes normal, conjunctivae and sclerae normal, no   icterus, no pallor, corneas clear, PERRLA   Ears:    Ears appear intact with no abnormalities noted   Throat:   No oral lesions, no thrush, oral mucosa moist   Neck:   No adenopathy, supple, trachea midline, no thyromegaly, no   carotid bruit, no JVD   Lungs:     Clear to auscultation,respirations regular, even and                  unlabored    Heart:    Regular rhythm and normal rate, normal S1 and S2, no            murmur, no gallop, no rub, no click   Chest Wall:    No abnormalities observed   Abdomen:     Normal bowel sounds, no masses, no organomegaly, soft        non-tender, non-distended, no guarding, no rebound                tenderness   Extremities:   Moves all extremities well, no edema, no cyanosis, no             redness   Pulses:   Pulses palpable and equal bilaterally   Skin:   No bleeding, bruising or rash   Lymph nodes:   No palpable adenopathy   Neurologic:   Cranial nerves 2 - 12 grossly intact, sensation intact, DTR       present and equal bilaterally       Discharge Disposition  Home or Self Care    Discharge Medications     Discharge Medications        New Medications        Instructions Start Date   acetaminophen 325 MG tablet  Commonly known as: TYLENOL   650 mg, Oral, Every 4 Hours PRN      guaiFENesin-dextromethorphan 100-10 MG/5ML syrup  Commonly  known as: ROBITUSSIN DM   10 mL, Oral, Every 4 Hours PRN             Changes to Medications        Instructions Start Date   levothyroxine 100 MCG tablet  Commonly known as: SYNTHROID, LEVOTHROID  What changed:   how much to take  how to take this  when to take this  additional instructions   Take 6 days a week.             Continue These Medications        Instructions Start Date   amLODIPine 5 MG tablet  Commonly known as: NORVASC   5 mg, Oral, Daily      apixaban 5 MG tablet tablet  Commonly known as: ELIQUIS   5 mg, Oral, 2 Times Daily      Farxiga 10 MG tablet  Generic drug: dapagliflozin Propanediol   1 tablet, Oral, Daily      losartan 100 MG tablet  Commonly known as: COZAAR   100 mg, Oral, Every Evening      potassium chloride ER 20 MEQ tablet controlled-release ER tablet  Commonly known as: K-TAB   20 mEq, Oral, 2 Times Daily      rosuvastatin 5 MG tablet  Commonly known as: CRESTOR   5 mg, Oral, 3 Times Weekly, Monday, Wednesday and Friday             Stop These Medications      hydrALAZINE 50 MG tablet  Commonly known as: APRESOLINE              Discharge Diet:     Activity at Discharge:     Follow-up Appointments  Future Appointments   Date Time Provider Department Center   10/14/2024 11:30 AM Sam Chao MD [facility]     Additional Instructions for the Follow-ups that You Need to Schedule       Discharge Follow-up with PCP   As directed       Currently Documented PCP:    Ariel Rider MD    PCP Phone Number:    [phone]     Follow Up Details: Keep scheduled appointment in November.  Call office for any needs or questions.        Discharge Follow-up with Specified Provider: Keep appointment with Dr. Chao as scheduled   As directed      To: Keep appointment with Dr. Chao as scheduled                Test Results Pending at Discharge       Altagracia King MD  10/06/24  12:41 EDT    Time: Discharge 25 min

## 2024-10-06 NOTE — PROGRESS NOTES
CARDIOLOGY PROGRESS NOTE:    Joi Cheng  68 y.o.  female  1955  [id number]      Referring Provider: Dr. King    Reason for follow-up: A-fib     Patient Care Team:  Ariel Rider MD as PCP - General (Family Medicine)  Sam Chao MD as Consulting Physician (Cardiology)    Subjective .  Patient complains of sore throat and cough    Objective lying in bed without any discomfort at this time     Review of Systems   Constitutional: Negative for malaise/fatigue.   Cardiovascular:  Negative for chest pain, dyspnea on exertion, leg swelling and palpitations.   Respiratory:  Positive for cough. Negative for shortness of breath.    Gastrointestinal:  Negative for abdominal pain, nausea and vomiting.   Neurological:  Negative for dizziness, focal weakness, headaches, light-headedness and numbness.   All other systems reviewed and are negative.      Allergies: Lisinopril    Scheduled Meds:apixaban, 5 mg, Oral, BID  empagliflozin, 25 mg, Oral, Daily  losartan, 100 mg, Oral, Q PM  potassium chloride, 20 mEq, Oral, BID  [START ON 10/7/2024] rosuvastatin, 5 mg, Oral, Once per day on Monday Wednesday Friday  sodium chloride, 10 mL, Intravenous, Q12H      Continuous Infusions:   PRN Meds:.  acetaminophen    senna-docusate sodium **AND** polyethylene glycol **AND** bisacodyl **AND** bisacodyl    Calcium Replacement - Follow Nurse / BPA Driven Protocol    guaiFENesin-dextromethorphan    Magnesium Standard Dose Replacement - Follow Nurse / BPA Driven Protocol    nitroglycerin    ondansetron    Phosphorus Replacement - Follow Nurse / BPA Driven Protocol    Potassium Replacement - Follow Nurse / BPA Driven Protocol    Insert Peripheral IV **AND** sodium chloride    sodium chloride        VITAL SIGNS  Vitals:    10/05/24 1129 10/05/24 1543 10/05/24 2017 10/06/24 0500   BP: 128/68 143/72 141/74 153/79   BP Location: Left arm Left arm Left arm Left arm   Patient Position: Lying Lying Lying Sitting   Pulse: 77 91 83 76  "  Resp: 15 16 15 18   Temp: 98.8 °F (37.1 °C) 98.5 °F (36.9 °C) 98.4 °F (36.9 °C) 97.9 °F (36.6 °C)   TempSrc: Oral Oral Oral Oral   SpO2: 97% 93% 96%    Weight:       Height:           Flowsheet Rows      Flowsheet Row First Filed Value   Admission Height 165.1 cm (65\") Documented at 10/04/2024 1915   Admission Weight 78.2 kg (172 lb 6.4 oz) Documented at 10/04/2024 1915             TELEMETRY: Sinus rhythm    Physical Exam:  Constitutional:       Appearance: Well-developed.   Eyes:      General: No scleral icterus.     Conjunctiva/sclera: Conjunctivae normal.   HENT:      Head: Normocephalic and atraumatic.   Neck:      Vascular: No carotid bruit or JVD.   Pulmonary:      Effort: Pulmonary effort is normal.      Breath sounds: Normal breath sounds. No wheezing. No rales.   Cardiovascular:      Normal rate. Regular rhythm.   Pulses:     Intact distal pulses.   Abdominal:      General: Bowel sounds are normal.      Palpations: Abdomen is soft.   Musculoskeletal:      Cervical back: Normal range of motion and neck supple. Skin:     General: Skin is warm and dry.      Findings: No rash.   Neurological:      Mental Status: Alert.          Results Review:   I reviewed the patient's new clinical results.  Lab Results (last 24 hours)       Procedure Component Value Units Date/Time    Magnesium [[id number]]  (Normal) Collected: 10/06/24 0450    Specimen: Blood Updated: 10/06/24 0614     Magnesium 2.2 mg/dL     Basic Metabolic Panel [[id number]]  (Abnormal) Collected: 10/06/24 0450    Specimen: Blood Updated: 10/06/24 0614     Glucose 107 mg/dL      BUN 17 mg/dL      Creatinine 0.94 mg/dL      Sodium 141 mmol/L      Potassium 3.8 mmol/L      Comment: Specimen hemolyzed.  Result may be falsely elevated.        Chloride 104 mmol/L      CO2 26.4 mmol/L      Calcium 8.7 mg/dL      BUN/Creatinine Ratio 18.1     Anion Gap 10.6 mmol/L      eGFR 66.6 mL/min/1.73     Narrative:      GFR Normal >60  Chronic Kidney Disease <60  Kidney " Failure <15      Respiratory Panel PCR w/COVID-19(SARS-CoV-2) [facility] In-House, NP Swab in UTM/VTM, 2 HR TAT - Swab, Nasopharynx [[id number]]  (Abnormal) Collected: 10/05/24 1751    Specimen: Swab from Nasopharynx Updated: 10/05/24 1900     ADENOVIRUS, PCR Not Detected     Coronavirus 229E Not Detected     Coronavirus HKU1 Not Detected     Coronavirus NL63 Not Detected     Coronavirus OC43 Not Detected     COVID19 Detected     Human Metapneumovirus Not Detected     Human Rhinovirus/Enterovirus Not Detected     Influenza A PCR Not Detected     Influenza B PCR Not Detected     Parainfluenza Virus 1 Not Detected     Parainfluenza Virus 2 Not Detected     Parainfluenza Virus 3 Not Detected     Parainfluenza Virus 4 Not Detected     RSV, PCR Not Detected     Bordetella pertussis pcr Not Detected     Bordetella parapertussis PCR Not Detected     Chlamydophila pneumoniae PCR Not Detected     Mycoplasma pneumo by PCR Not Detected    Narrative:      In the setting of a positive respiratory panel with a viral infection PLUS a negative procalcitonin without other underlying concern for bacterial infection, consider observing off antibiotics or discontinuation of antibiotics and continue supportive care. If the respiratory panel is positive for atypical bacterial infection (Bordetella pertussis, Chlamydophila pneumoniae, or Mycoplasma pneumoniae), consider antibiotic de-escalation to target atypical bacterial infection.    T4, Free [[id number]]  (Abnormal) Collected: 10/04/24 2029    Specimen: Blood Updated: 10/05/24 0650     Free T4 1.73 ng/dL             Imaging Results (Last 24 Hours)       ** No results found for the last 24 hours. **            EKG      I personally viewed and interpreted the patient's EKG/Telemetry data:    ECHOCARDIOGRAM:  Results for orders placed during the hospital encounter of 07/05/23    Adult Transthoracic Echo Complete W/ Cont if Necessary Per Protocol    Interpretation Summary     Left ventricular ejection fraction appears to be 66 - 70%.    Estimated right ventricular systolic pressure from tricuspid regurgitation is normal (<35 mmHg).       STRESS MYOVIEW:  Results for orders placed during the hospital encounter of 07/05/23    Stress Test With Myocardial Perfusion One Day    Interpretation Summary    Left ventricular ejection fraction is hyperdynamic (Calculated EF > 70%).    Myocardial perfusion imaging indicates a normal myocardial perfusion study with no evidence of ischemia.    Impressions are consistent with a low risk study.       CARDIAC CATHETERIZATION:  No results found for this or any previous visit.       OTHER:         Assessment & Plan     Atrial fibrillation  Patient has new onset and paroxysmal fibrillation  Patient had bradycardia and syncope and hence metoprolol was stopped  Patient's heart rate is better and he is in sinus rhythm and hence will encourage anticoagulation with either Eliquis or Xarelto  Patient still has the Holter monitor and will monitor the heart rate for any tacky or bradycardia arrhythmias     Hypertension  Patient blood pressure currently stable on losartan     Hyperlipidemia  Patient is on statins     Diabetes  Patient is on oral medicines.     Hypothyroidism  Patient is on levothyroxine and her T4 was high which could be causing his A-fib to and hence will recommend adjustment of dose only    COVID  Patient tested positive for COVID and hence will be managed by the primary care doctor    I discussed the patients findings and my recommendations with patient and nurse    Sam Chao MD  10/06/24  06:44 EDT

## 2024-10-06 NOTE — PLAN OF CARE
Goal Outcome Evaluation:         Pt is in isolation precautions for COVID positive as of 10/5 @1700hrs. Patient is furious that she just found out and has exposed family that has visited up to this point of her stay in the hospital. V/S are stable. Room Air. Up ad sunshine. A/Ox4. No other complaints at this time.

## 2024-10-06 NOTE — PLAN OF CARE
Goal Outcome Evaluation:              Outcome Evaluation: Pt discharging per MD

## 2024-10-07 ENCOUNTER — READMISSION MANAGEMENT (OUTPATIENT)
Dept: CALL CENTER | Facility: HOSPITAL | Age: 69
End: 2024-10-07
Payer: MEDICARE

## 2024-10-07 NOTE — CASE MANAGEMENT/SOCIAL WORK
Case Management Discharge Note      Final Note: Routine home.         Selected Continued Care - Discharged on 10/6/2024 Admission date: 10/4/2024 - Discharge disposition: Home or Self Care       Transportation Services  Private: Car    Final Discharge Disposition Code: 01 - home or self-care

## 2024-10-07 NOTE — OUTREACH NOTE
Prep Survey      Flowsheet Row Responses   [facility] facility patient discharged from? [facility]   Is LACE score < 7 ? No   Eligibility Readm Mgmt   Discharge diagnosis New onset atrial fibrillation   Does the patient have one of the following disease processes/diagnoses(primary or secondary)? Other   Does the patient have Home health ordered? No   Is there a DME ordered? No   Medication alerts for this patient see avs--eliquis   Prep survey completed? Yes            Lory MEDEL - Registered Nurse

## 2024-10-11 ENCOUNTER — READMISSION MANAGEMENT (OUTPATIENT)
Dept: CALL CENTER | Facility: HOSPITAL | Age: 69
End: 2024-10-11
Payer: MEDICARE

## 2024-10-11 NOTE — OUTREACH NOTE
Medical Week 1 Survey      Flowsheet Row Responses   [facility] patient discharged from? [facility]   Does the patient have one of the following disease processes/diagnoses(primary or secondary)? Other   Week 1 attempt successful? Yes   Call start time 1013   Call end time 1017   Discharge diagnosis New onset atrial fibrillation   Meds reviewed with patient/caregiver? Yes   Is the patient having any side effects they believe may be caused by any medication additions or changes? No   Does the patient have all medications ordered at discharge? Yes   Is the patient taking all medications as directed (includes completed medication regime)? Yes   Does the patient have a primary care provider?  Yes   Does the patient have an appointment with their PCP within 7 days of discharge? Greater than 7 days   Comments regarding PCP Patient has an appointment with Dr. Rider in November   Has the patient kept scheduled appointments due by today? N/A   Has home health visited the patient within 72 hours of discharge? N/A   Psychosocial issues? No   Did the patient receive a copy of their discharge instructions? Yes   Nursing interventions Reviewed instructions with patient   What is the patient's perception of their health status since discharge? Improving  [Patient is improving, but she is improving slowly]   Is the patient/caregiver able to teach back signs and symptoms related to disease process for when to call PCP? Yes   Is the patient/caregiver able to teach back signs and symptoms related to disease process for when to call 911? Yes   Is the patient/caregiver able to teach back the hierarchy of who to call/visit for symptoms/problems? PCP, Specialist, Home health nurse, Urgent Care, ED, 911 Yes   If the patient is a current smoker, are they able to teach back resources for cessation? Not a smoker   Week 1 call completed? Yes   Call end time 1017            Rhoda Cook Licensed Nurse

## 2024-10-14 ENCOUNTER — OFFICE VISIT (OUTPATIENT)
Dept: CARDIOLOGY | Facility: CLINIC | Age: 69
End: 2024-10-14
Payer: MEDICARE

## 2024-10-14 VITALS
BODY MASS INDEX: 28.66 KG/M2 | WEIGHT: 172 LBS | HEIGHT: 65 IN | HEART RATE: 62 BPM | DIASTOLIC BLOOD PRESSURE: 83 MMHG | SYSTOLIC BLOOD PRESSURE: 148 MMHG

## 2024-10-14 DIAGNOSIS — E78.00 PURE HYPERCHOLESTEROLEMIA: ICD-10-CM

## 2024-10-14 DIAGNOSIS — E11.9 TYPE 2 DIABETES MELLITUS WITHOUT COMPLICATION, WITHOUT LONG-TERM CURRENT USE OF INSULIN: ICD-10-CM

## 2024-10-14 DIAGNOSIS — I48.0 PAROXYSMAL ATRIAL FIBRILLATION: Primary | ICD-10-CM

## 2024-10-14 DIAGNOSIS — I10 PRIMARY HYPERTENSION: ICD-10-CM

## 2024-10-14 PROCEDURE — 1160F RVW MEDS BY RX/DR IN RCRD: CPT | Performed by: INTERNAL MEDICINE

## 2024-10-14 PROCEDURE — 3077F SYST BP >= 140 MM HG: CPT | Performed by: INTERNAL MEDICINE

## 2024-10-14 PROCEDURE — 3079F DIAST BP 80-89 MM HG: CPT | Performed by: INTERNAL MEDICINE

## 2024-10-14 PROCEDURE — 1159F MED LIST DOCD IN RCRD: CPT | Performed by: INTERNAL MEDICINE

## 2024-10-14 PROCEDURE — 99214 OFFICE O/P EST MOD 30 MIN: CPT | Performed by: INTERNAL MEDICINE

## 2024-10-14 RX ORDER — IBUPROFEN 200 MG
100 TABLET ORAL EVERY 6 HOURS PRN
COMMUNITY

## 2024-10-14 NOTE — PROGRESS NOTES
"    Subjective:     Encounter Date:10/14/2024      Patient ID: Joi Cheng is a 68 y.o. female.    Chief Complaint:  Follow-upAssociated symptoms include: shortness of breath. Pertinent negatives include no chest pain, no dizziness, no nausea, no palpitations, no headaches, no focal weakness, no abdominal pain and no cough.     68-year-old white female with paroxysmal fibrillation hypertension hyperlipidemia diabetes presents to my office for a follow-up.  Patient still continues to have some shortness of breath and fatigue and tiredness.  No complaint of chest pain.  No PND orthopnea.  No palpitations dizziness syncope or swelling of the feet.  She has a 30-day event monitor.  She is taking her medicines regularly.  The following portions of the patient's history were reviewed and updated as appropriate: allergies, current medications, past family history, past medical history, past social history, past surgical history, and problem list.  Past Medical History:   Diagnosis Date    Abnormal ECG 6/2/2023    Arthritis     Rheumatoid    Diabetes mellitus     Disease of thyroid gland     Hyperlipidemia     Hypertension     New onset atrial fibrillation 10/5/2024    PONV (postoperative nausea and vomiting)      Past Surgical History:   Procedure Laterality Date    ABDOMINAL SURGERY      CHOLECYSTECTOMY      FINGER SURGERY      HYSTERECTOMY      INCISION AND DRAINAGE OF WOUND Right 04/06/2021    Procedure: Incision and drainage of abscess of right face/cheek;  Surgeon: Ariel Hernandes DO;  Location: [facility] OR;  Service: General;  Laterality: Right;    NECK SURGERY      April 2024    OOPHORECTOMY      THYROID SURGERY      TUBAL ABDOMINAL LIGATION      1 tube removed     /83   Pulse 62   Ht 165.1 cm (65\")   Wt 78 kg (172 lb)   BMI 28.62 kg/m²   Family History   Problem Relation Age of Onset    Diabetes Mother     Heart attack Mother     Heart disease Mother     Heart failure Mother     COPD Father  "       Current Outpatient Medications:     amLODIPine (NORVASC) 5 MG tablet, Take 1 tablet by mouth Daily., Disp: , Rfl:     apixaban (ELIQUIS) 5 MG tablet tablet, Take 1 tablet by mouth 2 (Two) Times a Day., Disp: 56 tablet, Rfl: 0    dapagliflozin Propanediol (Farxiga) 10 MG tablet, Take 10 mg by mouth Daily., Disp: , Rfl:     guaiFENesin-dextromethorphan (ROBITUSSIN DM) 100-10 MG/5ML syrup, Take 10 mL by mouth Every 4 (Four) Hours As Needed for Cough., Disp: , Rfl:     ibuprofen (ADVIL,MOTRIN) 200 MG tablet, Take 0.5 tablets by mouth Every 6 (Six) Hours As Needed for Mild Pain., Disp: , Rfl:     levothyroxine (SYNTHROID, LEVOTHROID) 100 MCG tablet, Take 6 days a week., Disp: , Rfl:     losartan (COZAAR) 100 MG tablet, Take 1 tablet by mouth Every Evening., Disp: , Rfl:     potassium chloride ER (K-TAB) 20 MEQ tablet controlled-release ER tablet, Take 1 tablet by mouth 2 (Two) Times a Day., Disp: , Rfl:     rosuvastatin (CRESTOR) 5 MG tablet, Take 1 tablet by mouth 3 (Three) Times a Week. Monday, Wednesday and Friday, Disp: , Rfl:     acetaminophen (TYLENOL) 325 MG tablet, Take 2 tablets by mouth Every 4 (Four) Hours As Needed for Mild Pain. (Patient not taking: Reported on 10/14/2024), Disp: , Rfl:   Allergies   Allergen Reactions    Lisinopril Anaphylaxis     Social History     Socioeconomic History    Marital status:    Tobacco Use    Smoking status: Never    Smokeless tobacco: Never   Vaping Use    Vaping status: Never Used   Substance and Sexual Activity    Alcohol use: Not Currently     Comment: Rarely do I drink    Drug use: Never    Sexual activity: Not Currently     Partners: Male     Birth control/protection: Hysterectomy     Review of Systems   Constitutional: Positive for malaise/fatigue.   Cardiovascular:  Negative for chest pain, dyspnea on exertion, leg swelling and palpitations.   Respiratory:  Positive for shortness of breath. Negative for cough.    Gastrointestinal:  Negative for abdominal  pain, nausea and vomiting.   Neurological:  Negative for dizziness, focal weakness, headaches, light-headedness and numbness.   All other systems reviewed and are negative.             Objective:     Constitutional:       Appearance: Well-developed.   Eyes:      General: No scleral icterus.     Conjunctiva/sclera: Conjunctivae normal.   HENT:      Head: Normocephalic and atraumatic.   Neck:      Vascular: No carotid bruit or JVD.   Pulmonary:      Effort: Pulmonary effort is normal.      Breath sounds: Normal breath sounds. No wheezing. No rales.   Cardiovascular:      Normal rate. Regular rhythm.   Pulses:     Intact distal pulses.   Abdominal:      General: Bowel sounds are normal.      Palpations: Abdomen is soft.   Musculoskeletal:      Cervical back: Normal range of motion and neck supple. Skin:     General: Skin is warm and dry.      Findings: No rash.   Neurological:      Mental Status: Alert.       Procedures    Lab Review:         MDM    #1 paroxysmal atrial fibrillation  Patient has new onset atrial fibrillation which is paroxysmal and is currently in sinus rhythm but is on Eliquis.  She cannot take beta-blockers because of low heart rates.  She has a 30-day event monitor pending which will decide whether she will need a pacemaker or ablation.  2.  Hypertension  Patient blood pressure currently stable on losartan and amlodipine  3.  Hyperlipidemia  Patient is on Crestor and the lipid levels are well within normal limits  4.  Diabetes  Pain is on oral medicines and followed by the primary care doctor.    Patient's previous medical records, labs, and EKG were reviewed and discussed with the patient at today's visit.

## 2024-10-21 ENCOUNTER — READMISSION MANAGEMENT (OUTPATIENT)
Dept: CALL CENTER | Facility: HOSPITAL | Age: 69
End: 2024-10-21
Payer: MEDICARE

## 2024-10-21 NOTE — OUTREACH NOTE
Medical Week 2 Survey      Flowsheet Row Responses   [facility] patient discharged from? [facility]   Does the patient have one of the following disease processes/diagnoses(primary or secondary)? Other   Week 2 attempt successful? Yes   Call start time 1158   Call end time 1207   Meds reviewed with patient/caregiver? Yes   Is the patient having any side effects they believe may be caused by any medication additions or changes? No   Does the patient have all medications ordered at discharge? Yes   Is the patient taking all medications as directed (includes completed medication regime)? Yes   Comments regarding appointments PCP appt in November. Has seen her cardiologist already.   Does the patient have a primary care provider?  Yes   Does the patient have an appointment with their PCP within 7 days of discharge? Yes   Has the patient kept scheduled appointments due by today? Yes   Has home health visited the patient within 72 hours of discharge? N/A   DME comments [facility] heart monitor completed last week.   Psychosocial issues? No   Did the patient receive a copy of their discharge instructions? Yes   Nursing interventions Reviewed instructions with patient   What is the patient's perception of their health status since discharge? Same   Is the patient/caregiver able to teach back signs and symptoms related to disease process for when to call PCP? Yes   Is the patient/caregiver able to teach back signs and symptoms related to disease process for when to call 911? Yes   Is the patient/caregiver able to teach back the hierarchy of who to call/visit for symptoms/problems? PCP, Specialist, Home health nurse, Urgent Care, ED, 911 Yes   If the patient is a current smoker, are they able to teach back resources for cessation? Not a smoker   Additional teach back comments States is monitoring her BP/HR at home and keeping record for appts.   Week 2 Call Completed? Yes   Is the patient interested in additional calls from an  "ambulatory ? No   Would this patient benefit from a Referral to [facility] Social Work? No   Wrap up additional comments Patient  is feeling about the same.  still has fatigue/occasional headache-\"swimmy headed feeling\". States /81, HR at 55 at rest.  has followed up with her cardiologist, and waiting for results from [facility] monitor. Denies any needs today.   Call end time 1207            Dari NAJERA - Registered Nurse  "

## 2024-10-29 ENCOUNTER — READMISSION MANAGEMENT (OUTPATIENT)
Dept: CALL CENTER | Facility: HOSPITAL | Age: 69
End: 2024-10-29
Payer: MEDICARE

## 2024-10-29 ENCOUNTER — TELEPHONE (OUTPATIENT)
Dept: CARDIOLOGY | Facility: CLINIC | Age: 69
End: 2024-10-29
Payer: MEDICARE

## 2024-10-29 NOTE — OUTREACH NOTE
Medical Week 3 Survey      Flowsheet Row Responses   [facility] patient discharged from? [facility]   Does the patient have one of the following disease processes/diagnoses(primary or secondary)? Other   Week 3 attempt successful? Yes   Call start time 1552   Call end time 1554   Discharge diagnosis New onset atrial fibrillation   Meds reviewed with patient/caregiver? Yes   Is the patient taking all medications as directed (includes completed medication regime)? Yes   Comments regarding appointments Waiting for a return call from cards   Does the patient have a primary care provider?  Yes   Comments regarding PCP PCP apt in two weeks   Has the patient kept scheduled appointments due by today? Yes  [[facility] apt since hosp dc]   What is the patient's perception of their health status since discharge? Same   Week 3 Call Completed? Yes   Graduated Yes   Graduated/Revoked comments Pt states she has good days and bad days   Call end time 1554            Marilu MEDEL - Registered Nurse

## 2024-10-29 NOTE — TELEPHONE ENCOUNTER
[facility] Tele[facility] (09/17/2024 13:02)     Please contact patient with [facility]cot results

## 2024-10-29 NOTE — TELEPHONE ENCOUNTER
Rx Refill Note  Requested Prescriptions     Pending Prescriptions Disp Refills    apixaban (ELIQUIS) 5 MG tablet tablet 180 tablet 0     Sig: Take 1 tablet by mouth 2 (Two) Times a Day.      Last office visit with prescribing clinician: 10/14/2024   Last telemedicine visit with prescribing clinician: Visit date not found   Next office visit with prescribing clinician: 4/15/2025                         Would you like a call back once the refill request has been completed: [] Yes [] No    If the office needs to give you a call back, can they leave a voicemail: [] Yes [] No    Lavelle Dahl MA  10/29/24, 16:45 EDT

## 2024-10-29 NOTE — TELEPHONE ENCOUNTER
Pt requesting holter monitor results. Also, she is almost out of Eliquis samples if she is to continue taking.

## 2024-11-12 ENCOUNTER — APPOINTMENT (OUTPATIENT)
Dept: GENERAL RADIOLOGY | Facility: HOSPITAL | Age: 69
End: 2024-11-12
Payer: MEDICARE

## 2024-11-12 LAB
ALBUMIN SERPL-MCNC: 4.2 G/DL (ref 3.5–5.2)
ALBUMIN/GLOB SERPL: 1.8 G/DL
ALP SERPL-CCNC: 91 U/L (ref 39–117)
ALT SERPL W P-5'-P-CCNC: 16 U/L (ref 1–33)
ANION GAP SERPL CALCULATED.3IONS-SCNC: 9.8 MMOL/L (ref 5–15)
AST SERPL-CCNC: 21 U/L (ref 1–32)
BASOPHILS # BLD AUTO: 0.05 10*3/MM3 (ref 0–0.2)
BASOPHILS NFR BLD AUTO: 0.7 % (ref 0–1.5)
BILIRUB SERPL-MCNC: 0.3 MG/DL (ref 0–1.2)
BUN SERPL-MCNC: 18 MG/DL (ref 8–23)
BUN/CREAT SERPL: 17.5 (ref 7–25)
CALCIUM SPEC-SCNC: 9.1 MG/DL (ref 8.6–10.5)
CHLORIDE SERPL-SCNC: 103 MMOL/L (ref 98–107)
CO2 SERPL-SCNC: 30.2 MMOL/L (ref 22–29)
CREAT SERPL-MCNC: 1.03 MG/DL (ref 0.57–1)
DEPRECATED RDW RBC AUTO: 42.9 FL (ref 37–54)
EGFRCR SERPLBLD CKD-EPI 2021: 59.7 ML/MIN/1.73
EOSINOPHIL # BLD AUTO: 0.13 10*3/MM3 (ref 0–0.4)
EOSINOPHIL NFR BLD AUTO: 1.8 % (ref 0.3–6.2)
ERYTHROCYTE [DISTWIDTH] IN BLOOD BY AUTOMATED COUNT: 12.7 % (ref 12.3–15.4)
GEN 5 2HR TROPONIN T REFLEX: 8 NG/L
GLOBULIN UR ELPH-MCNC: 2.4 GM/DL
GLUCOSE SERPL-MCNC: 113 MG/DL (ref 65–99)
HCT VFR BLD AUTO: 44.6 % (ref 34–46.6)
HGB BLD-MCNC: 14.5 G/DL (ref 12–15.9)
HOLD SPECIMEN: NORMAL
IMM GRANULOCYTES # BLD AUTO: 0.04 10*3/MM3 (ref 0–0.05)
IMM GRANULOCYTES NFR BLD AUTO: 0.5 % (ref 0–0.5)
LYMPHOCYTES # BLD AUTO: 1.86 10*3/MM3 (ref 0.7–3.1)
LYMPHOCYTES NFR BLD AUTO: 25.2 % (ref 19.6–45.3)
MCH RBC QN AUTO: 29.9 PG (ref 26.6–33)
MCHC RBC AUTO-ENTMCNC: 32.5 G/DL (ref 31.5–35.7)
MCV RBC AUTO: 92 FL (ref 79–97)
MONOCYTES # BLD AUTO: 0.63 10*3/MM3 (ref 0.1–0.9)
MONOCYTES NFR BLD AUTO: 8.5 % (ref 5–12)
NEUTROPHILS NFR BLD AUTO: 4.68 10*3/MM3 (ref 1.7–7)
NEUTROPHILS NFR BLD AUTO: 63.3 % (ref 42.7–76)
NRBC BLD AUTO-RTO: 0 /100 WBC (ref 0–0.2)
NT-PROBNP SERPL-MCNC: <36 PG/ML (ref 0–900)
PLATELET # BLD AUTO: 190 10*3/MM3 (ref 140–450)
PMV BLD AUTO: 11.5 FL (ref 6–12)
POTASSIUM SERPL-SCNC: 3.5 MMOL/L (ref 3.5–5.2)
PROT SERPL-MCNC: 6.6 G/DL (ref 6–8.5)
RBC # BLD AUTO: 4.85 10*6/MM3 (ref 3.77–5.28)
SODIUM SERPL-SCNC: 143 MMOL/L (ref 136–145)
TROPONIN T DELTA: 1 NG/L
TROPONIN T SERPL HS-MCNC: 7 NG/L
WBC NRBC COR # BLD AUTO: 7.39 10*3/MM3 (ref 3.4–10.8)
WHOLE BLOOD HOLD COAG: NORMAL

## 2024-11-12 PROCEDURE — 84484 ASSAY OF TROPONIN QUANT: CPT | Performed by: NURSE PRACTITIONER

## 2024-11-12 PROCEDURE — 85025 COMPLETE CBC W/AUTO DIFF WBC: CPT | Performed by: NURSE PRACTITIONER

## 2024-11-12 PROCEDURE — 83880 ASSAY OF NATRIURETIC PEPTIDE: CPT | Performed by: NURSE PRACTITIONER

## 2024-11-12 PROCEDURE — 99284 EMERGENCY DEPT VISIT MOD MDM: CPT

## 2024-11-12 PROCEDURE — 36415 COLL VENOUS BLD VENIPUNCTURE: CPT

## 2024-11-12 PROCEDURE — 71045 X-RAY EXAM CHEST 1 VIEW: CPT

## 2024-11-12 PROCEDURE — 93005 ELECTROCARDIOGRAM TRACING: CPT

## 2024-11-12 PROCEDURE — 80053 COMPREHEN METABOLIC PANEL: CPT | Performed by: NURSE PRACTITIONER

## 2024-11-12 RX ORDER — SODIUM CHLORIDE 0.9 % (FLUSH) 0.9 %
10 SYRINGE (ML) INJECTION AS NEEDED
Status: DISCONTINUED | OUTPATIENT
Start: 2024-11-12 | End: 2024-11-13 | Stop reason: HOSPADM

## 2024-11-13 ENCOUNTER — PREP FOR SURGERY (OUTPATIENT)
Dept: OTHER | Facility: HOSPITAL | Age: 69
End: 2024-11-13
Payer: MEDICARE

## 2024-11-13 ENCOUNTER — HOSPITAL ENCOUNTER (EMERGENCY)
Facility: HOSPITAL | Age: 69
Discharge: HOME | End: 2024-11-13
Payer: MEDICARE

## 2024-11-13 ENCOUNTER — OFFICE VISIT (OUTPATIENT)
Dept: CARDIOLOGY | Facility: CLINIC | Age: 69
End: 2024-11-13
Payer: MEDICARE

## 2024-11-13 VITALS
BODY MASS INDEX: 28.99 KG/M2 | OXYGEN SATURATION: 97 % | HEART RATE: 86 BPM | HEIGHT: 65 IN | DIASTOLIC BLOOD PRESSURE: 86 MMHG | SYSTOLIC BLOOD PRESSURE: 150 MMHG | WEIGHT: 174 LBS

## 2024-11-13 VITALS
HEIGHT: 65 IN | BODY MASS INDEX: 28.16 KG/M2 | TEMPERATURE: 98.1 F | OXYGEN SATURATION: 98 % | DIASTOLIC BLOOD PRESSURE: 87 MMHG | HEART RATE: 70 BPM | SYSTOLIC BLOOD PRESSURE: 144 MMHG | RESPIRATION RATE: 12 BRPM | WEIGHT: 169 LBS

## 2024-11-13 DIAGNOSIS — R00.2 PALPITATIONS: Primary | ICD-10-CM

## 2024-11-13 DIAGNOSIS — I49.5 SICK SINUS SYNDROME: Primary | ICD-10-CM

## 2024-11-13 DIAGNOSIS — I10 ESSENTIAL HYPERTENSION: Chronic | ICD-10-CM

## 2024-11-13 DIAGNOSIS — I48.0 PAROXYSMAL ATRIAL FIBRILLATION: Primary | ICD-10-CM

## 2024-11-13 DIAGNOSIS — R07.9 CHEST PAIN, UNSPECIFIED TYPE: ICD-10-CM

## 2024-11-13 DIAGNOSIS — I49.5 SICK SINUS SYNDROME: ICD-10-CM

## 2024-11-13 LAB
QT INTERVAL: 441 MS
QTC INTERVAL: 476 MS

## 2024-11-13 NOTE — DISCHARGE INSTRUCTIONS
You were evaluated in the emergency department today for your palpitations and chest pain.  Labs and EKG indicate no need for emergent intervention.  Please keep your follow-up appointment with a cardiologist tomorrow.  Continue taking all prescribed medications.    Return to the ER for any new or worsening symptoms.

## 2024-11-13 NOTE — ED PROVIDER NOTES
Subjective     Provider in Triage Note  Patient is a 68-year-old female obese female who comes in with an hour and a half of palpitations and chest pain associated with it.  She states she has paroxysmal tachycardia versus bradycardia and wore a heart monitor for 30 days and is set to have a pacemaker placed with Dr. Hoffman whom she has an appointment with tomorrow in the office    Mild shortness of breath associated with the palpitations.    Due to significant overcrowding in the emergency department patient was initially seen and evaluated in triage.  Provider in triage recommended patient placement in the treatment area to initiate therapy and movement to an ER bed as soon as possible.    History of Present Illness  Reviewed provider in triage note and agree  Review of Systems    Past Medical History:   Diagnosis Date    Abnormal ECG 6/2/2023    Arthritis     Rheumatoid    Diabetes mellitus     Disease of thyroid gland     Hyperlipidemia     Hypertension     New onset atrial fibrillation 10/5/2024    PONV (postoperative nausea and vomiting)        Allergies   Allergen Reactions    Lisinopril Anaphylaxis       Past Surgical History:   Procedure Laterality Date    ABDOMINAL SURGERY      CHOLECYSTECTOMY      FINGER SURGERY      HYSTERECTOMY      INCISION AND DRAINAGE OF WOUND Right 04/06/2021    Procedure: Incision and drainage of abscess of right face/cheek;  Surgeon: Ariel Hernandes DO;  Location: [facility] MAIN OR;  Service: General;  Laterality: Right;    NECK SURGERY      April 2024    OOPHORECTOMY      THYROID SURGERY      TUBAL ABDOMINAL LIGATION      1 tube removed       Family History   Problem Relation Age of Onset    Diabetes Mother     Heart attack Mother     Heart disease Mother     Heart failure Mother     COPD Father        Social History     Socioeconomic History    Marital status:    Tobacco Use    Smoking status: Never    Smokeless tobacco: Never   Vaping Use    Vaping status: Never Used  "  Substance and Sexual Activity    Alcohol use: Not Currently     Comment: Rarely do I drink    Drug use: Never    Sexual activity: Not Currently     Partners: Male     Birth control/protection: Hysterectomy           Objective   Physical Exam  Vitals and nursing note reviewed.   Constitutional:       General: She is not in acute distress.     Appearance: She is well-developed. She is not ill-appearing, toxic-appearing or diaphoretic.   HENT:      Head: Normocephalic and atraumatic.   Eyes:      Extraocular Movements: Extraocular movements intact.      Pupils: Pupils are equal, round, and reactive to light.   Cardiovascular:      Rate and Rhythm: Normal rate and regular rhythm.      Heart sounds: Normal heart sounds.   Pulmonary:      Effort: Pulmonary effort is normal.      Breath sounds: Normal breath sounds.   Abdominal:      General: Bowel sounds are normal.      Palpations: Abdomen is soft.   Musculoskeletal:         General: Normal range of motion.      Cervical back: Normal range of motion and neck supple.   Skin:     General: Skin is warm and dry.      Capillary Refill: Capillary refill takes less than 2 seconds.   Neurological:      General: No focal deficit present.      Mental Status: She is alert.   Psychiatric:         Mood and Affect: Mood normal.         Behavior: Behavior normal.         Procedures           ED Course  ED Course as of 11/13/24 0324   Tue Nov 12, 2024 2218 Patient pulled back into Pit for second troponin and recheck of vital signs initial troponin within normal limits. [KW]      ED Course User Index  [KW] Tenisha Pérez, APRN      /87 (BP Location: Left arm, Patient Position: Sitting)   Pulse 70   Temp 98.1 °F (36.7 °C) (Oral)   Resp 12   Ht 165.1 cm (65\")   Wt 76.7 kg (169 lb)   SpO2 98%   BMI 28.12 kg/m²   Labs Reviewed   COMPREHENSIVE METABOLIC PANEL - Abnormal; Notable for the following components:       Result Value    Glucose 113 (*)     Creatinine 1.03 (*)  "    CO2 30.2 (*)     eGFR 59.7 (*)     All other components within normal limits    Narrative:     GFR Normal >60  Chronic Kidney Disease <60  Kidney Failure <15     BNP (IN-HOUSE) - Normal    Narrative:     This assay is used as an aid in the diagnosis of individuals suspected of having heart failure. It can be used as an aid in the diagnosis of acute decompensated heart failure (ADHF) in patients presenting with signs and symptoms of ADHF to the emergency department (ED). In addition, NT-proBNP of <300 pg/mL indicates ADHF is not likely.    Age Range Result Interpretation  NT-proBNP Concentration (pg/mL:      <50             Positive            >450                   Gray                 300-450                    Negative             <300    50-75           Positive            >900                  Gray                300-900                  Negative            <300      >75             Positive            >1800                  Gray                300-1800                  Negative            <300   TROPONIN - Normal    Narrative:     High Sensitive Troponin T Reference Range:  <14.0 ng/L- Negative Female for AMI  <22.0 ng/L- Negative Male for AMI  >=14 - Abnormal Female indicating possible myocardial injury.  >=22 - Abnormal Male indicating possible myocardial injury.   Clinicians would have to utilize clinical acumen, EKG, Troponin, and serial changes to determine if it is an Acute Myocardial Infarction or myocardial injury due to an underlying chronic condition.        CBC WITH AUTO DIFFERENTIAL - Normal   HIGH SENSITIVITIY TROPONIN T 2HR - Normal    Narrative:     High Sensitive Troponin T Reference Range:  <14.0 ng/L- Negative Female for AMI  <22.0 ng/L- Negative Male for AMI  >=14 - Abnormal Female indicating possible myocardial injury.  >=22 - Abnormal Male indicating possible myocardial injury.   Clinicians would have to utilize clinical acumen, EKG, Troponin, and serial changes to determine if it is an  Acute Myocardial Infarction or myocardial injury due to an underlying chronic condition.        CBC AND DIFFERENTIAL    Narrative:     The following orders were created for panel order CBC & Differential.  Procedure                               Abnormality         Status                     ---------                               -----------         ------                     CBC Auto Differential[[id number]]        Normal              Final result                 Please view results for these tests on the individual orders.   EXTRA TUBES    Narrative:     The following orders were created for panel order Extra Tubes.  Procedure                               Abnormality         Status                     ---------                               -----------         ------                     Gold Top - SST[[id number]]                                   Final result               Light Blue Top[[id number]]                                   Final result                 Please view results for these tests on the individual orders.   GOLD TOP - SST   LIGHT BLUE TOP     Scheduled Meds:  Continuous Infusions:No current facility-administered medications for this encounter.    PRN Meds:.  .ra1day                No data recorded                             Medical Decision Making  Chart Review:  Mobile Cardiac Outpatient Telemetry    Accession Number: [id number]  Date of Study: 9/17/24  Ordering Provider: Sam Chao MD  Clinical Indications: None Listed      [facility]  Performing Staff  Cardiology: Sam Chao MD     Tech: Radha Hampton         Interpretation Summary       ·  An abnormal monitor study.  ·  There are 2 episodes of wide-complex rhythm which are about 100 -120 bpm which could be atrial fibrillation with aberration because of the irregularity but cannot rule out ventricular tachycardia.  ·  There is also significant pauses of up to 3.5 seconds  ·  Holter monitor consistent with tachybradycardia  syndrome     Patient waiting to have pacemaker placed.     Amount and/or Complexity of Data Reviewed  Labs: ordered.  Radiology: ordered.  ECG/medicine tests: ordered.    Risk  Prescription drug management.    Patient presented with chest pain and tachybradycardia.  History obtained from patient.    EKG reviewed: EKG as interpreted by Dr. Florez and reviewed by myself shows sinus rhythm with a left posterior fascicular block rate of 70.  When compared to EKG from 10/4/2024 shows sinus rhythm with an incomplete right bundle branch block and left anterior fascicular block with a rate of 98.    Labs reviewed:   Labs Reviewed   COMPREHENSIVE METABOLIC PANEL - Abnormal; Notable for the following components:       Result Value    Glucose 113 (*)     Creatinine 1.03 (*)     CO2 30.2 (*)     eGFR 59.7 (*)     All other components within normal limits    Narrative:     GFR Normal >60                  Chronic Kidney Disease <60                  Kidney Failure <15                     BNP (IN-HOUSE) - Normal    Narrative:     This assay is used as an aid in the diagnosis of individuals suspected of having heart failure. It can be used as an aid in the diagnosis of acute decompensated heart failure (ADHF) in patients presenting with signs and symptoms of ADHF to the emergency department (ED). In addition, NT-proBNP of <300 pg/mL indicates ADHF is not likely.                                    Age Range Result Interpretation  NT-proBNP Concentration (pg/mL:                                                      <50             Positive            >450                                   Gray                 300-450                                    Negative             <300                                    50-75           Positive            >900                                  Vincent                300-900                                  Negative            <300                                                      >75              Positive            >1800                                  Vincent                300-1800                                  Negative            <300   TROPONIN - Normal    Narrative:     High Sensitive Troponin T Reference Range:                  <14.0 ng/L- Negative Female for AMI                  <22.0 ng/L- Negative Male for AMI                  >=14 - Abnormal Female indicating possible myocardial injury.                  >=22 - Abnormal Male indicating possible myocardial injury.                   Clinicians would have to utilize clinical acumen, EKG, Troponin, and serial changes to determine if it is an Acute Myocardial Infarction or myocardial injury due to an underlying chronic condition.                                        CBC WITH AUTO DIFFERENTIAL - Normal   HIGH SENSITIVITIY TROPONIN T 2HR - Normal    Narrative:     High Sensitive Troponin T Reference Range:                  <14.0 ng/L- Negative Female for AMI                  <22.0 ng/L- Negative Male for AMI                  >=14 - Abnormal Female indicating possible myocardial injury.                  >=22 - Abnormal Male indicating possible myocardial injury.                   Clinicians would have to utilize clinical acumen, EKG, Troponin, and serial changes to determine if it is an Acute Myocardial Infarction or myocardial injury due to an underlying chronic condition.                                        CBC AND DIFFERENTIAL    Narrative:     The following orders were created for panel order CBC & Differential.                  Procedure                               Abnormality         Status                                     ---------                               -----------         ------                                     CBC Auto Differential[[id number]]        Normal              Final result                                                 Please view results for these tests on the individual orders.   EXTRA TUBES    Narrative:     The  following orders were created for panel order Extra Tubes.                  Procedure                               Abnormality         Status                                     ---------                               -----------         ------                                     Gold Top - SST[[id number]]                                   Final result                               Light Blue Top[[id number]]                                   Final result                                                 Please view results for these tests on the individual orders.   GOLD TOP - SST   LIGHT BLUE TOP         Imaging reviewed: XR Chest 1 View    Result Date: 11/12/2024  Impression: No acute intrathoracic finding Electronically Signed: Yinka Joya  11/12/2024 8:25 PM EST  Workstation ID: [id number]     Reviewed internal records dated 10/4/2024 for palpitations.    Patient was treated with the following medications while in the ED;   Medications - No data to display    Evaluation of patient IV was established labs imaging and EKG were obtained.  Imaging and EKG as above.  Labs are essentially unremarkable.  CMP does show glucose 113 creatinine 1.03 and GFR 59.7.  For this patient.  She has an appointment standing with her cardiologist in the morning.  Patient feels better now and the palpitations have eased.  Reports that she would like to go home so she can see her own cardiologist in the morning.  Patient given discharge follow-up and return to ED instructions and expresses understanding.      Consideration was given for admission, but the patient was stable for outpatient management as patient was ambulatory, nontoxic, stable, and afebrile.  Exam as above.    Disposition: Discussed need to follow-up diagnostics, including incidental findings.  Discharged with instructions to obtain outpatient follow-up with patient's symptoms and findings, with strict return precautions if patient develops new or worsening  symptoms.    This document is intended for medical expert use only. Reading of this document by patients and/or patient's family without participating medical staff guidance may result in misinterpretation and unintended morbidity.  Any interpretation of such data is the responsibility of the patient and/or family member responsible for the patient in concert with their primary or specialist providers, not to be left for sources of online searches such as [employer] or similar queries. Relying on these approaches to knowledge may result in misinterpretation, misguided goals of care and even death should patients or family members try recommendations outside of the realm of professional medical care in a supervised inpatient environment.       Final diagnoses:   Palpitations   Chest pain, unspecified type       ED Disposition  ED Disposition       ED Disposition   Discharge    Condition   Stable    Comment   --               Ariel Rider MD  [address] [facility] [place] [address] [phone]          Carleen Iraheta MD  [address] [facility] [place] [address] [phone]               Medication List      No changes were made to your prescriptions during this visit.            Shanika Mcleod, APRN  11/13/24 0324

## 2024-11-13 NOTE — PROGRESS NOTES
HP      Name: Joi Cheng ADMIT: (Not on file)   : 1955  PCP: Ariel Rider MD    MRN: [id number] LOS: 0 days   AGE/SEX: 68 y.o. female  ROOM: Room/bed info not found     Chief Complaint   Patient presents with    Consult     NP discuss PM        Subjective        History of present illness  Joi Cheng is a 68-year-old female patient who is here today for evaluation of bradycardia as well as atrial fibrillation.  Patient has been complaining of fatigue and lack of energy for some time now and oftentimes her heart rate runs in the low 50s.    Past Medical History:   Diagnosis Date    Abnormal ECG 2023    Arthritis     Rheumatoid    Diabetes mellitus     Disease of thyroid gland     Hyperlipidemia     Hypertension     New onset atrial fibrillation 10/5/2024    PONV (postoperative nausea and vomiting)      Past Surgical History:   Procedure Laterality Date    ABDOMINAL SURGERY      CHOLECYSTECTOMY      FINGER SURGERY      HYSTERECTOMY      INCISION AND DRAINAGE OF WOUND Right 2021    Procedure: Incision and drainage of abscess of right face/cheek;  Surgeon: Ariel Hernandes DO;  Location: [facility] OR;  Service: General;  Laterality: Right;    NECK SURGERY      2024    OOPHORECTOMY      THYROID SURGERY      TUBAL ABDOMINAL LIGATION      1 tube removed     Family History   Problem Relation Age of Onset    Diabetes Mother     Heart attack Mother     Heart disease Mother     Heart failure Mother     COPD Father     Arrhythmia Father      Social History     Tobacco Use    Smoking status: Never     Passive exposure: Never    Smokeless tobacco: Never   Vaping Use    Vaping status: Never Used   Substance Use Topics    Alcohol use: Not Currently     Comment: Rarely do I drink    Drug use: Never     (Not in a hospital admission)    Allergies:  Lisinopril    Review of systems    Constitutional: Negative.    Respiratory and cardiovascular: As detailed in HPI section.  Gastrointestinal:  Negative for constipation, nausea and vomiting negative for abdominal distention, abdominal pain and diarrhea.   Genitourinary: Negative for difficulty urinating and flank pain.   Musculoskeletal: Negative for arthralgias, joint swelling and myalgias.   Skin: Negative for color change, rash and wound.   Neurological: Negative for dizziness, syncope, weakness and headaches.   Hematological: Negative for adenopathy.   Psychiatric/Behavioral: Negative for confusion.   All other systems reviewed and are negative.    Physical Exam  VITALS REVIEWED    General:      well developed, in no acute distress.    Head:      normocephalic and atraumatic.    Eyes:      PERRL/EOM intact, conjunctiva and sclera clear with out nystagmus.    Neck:      no masses, thyromegaly,  trachea central with normal respiratory effort and PMI displaced laterally  Lungs:      Clear to auscultation bilaterally  Heart:       Regular rate and rhythm  Msk:      no deformity or scoliosis noted of thoracic or lumbar spine.    Pulses:      pulses normal in all 4 extremities.    Extremities:       No lower extremity edema  Neurologic:      no focal deficits.   alert oriented x3  Skin:      intact without lesions or rashes.    Psych:      alert and cooperative; normal mood and affect; normal attention span and concentration.      Result Review :               Pertinent cardiac workup    EKG 11/12/2024 sinus rhythm  Event monitor 27 days and 22 hours starting on 9/17/2024 showed atrial fibrillation with paroxysmal atrial fibrillation and atrial flutter.  Both arrhythmias seen.  She also has episodes of sinus bradycardia, lowest heart rate was 37 bpm.  Also a 3.5-second conversion pause was seen.      Procedures        Assessment and Plan      Joi Cheng is a 68-year-old female patient who is here today for evaluation of essentially sick sinus syndrome.  Patient has bradycardic episodes but also both A-fib and atrial flutter.  Certainly at this time  antiarrhythmics alone cannot be used due to bradycardic events.  Today we discussed about rhythm management options.  In her case I think the best option is to do a combined procedure for A-fib/atrial flutter ablation followed by dual-chamber direct conduction system pacemaker implantation.  This way we can address both the arrhythmia and bradycardia.  Risk and benefits and indications for both procedures were discussed at length with her and she is agreeable.  After the procedures, we can start her on an antiarrhythmic perhaps for a short duration.    Diagnoses and all orders for this visit:    1. Paroxysmal atrial fibrillation (Primary)    2. Sick sinus syndrome    3. Essential hypertension           No follow-ups on file.  Patient was given instructions and counseling regarding her condition or for health maintenance advice. Please see specific information pulled into the AVS if appropriate.       Electronically signed by Carleen Iraheta MD, 11/13/24, 3:58 PM EST.

## 2024-11-25 ENCOUNTER — TELEPHONE (OUTPATIENT)
Dept: CARDIOLOGY | Facility: CLINIC | Age: 69
End: 2024-11-25
Payer: MEDICARE

## 2024-11-25 NOTE — TELEPHONE ENCOUNTER
Caller: Joi Cheng    Relationship to patient: Self    Best call back number: [phone]    Patient is needing:    PATIENT CHECKING TO SEE IF SHE  NEEDS TO TAKE TIME OFF WORK AFTER AN ABLATION AND PACEMAKER. PATIENT IS PART TIME  3 HOURS A DAY  2- 3 DAYS. NO LIFTING OR CHASING. PRETTY LOW IMPACT JOB.

## 2024-11-25 NOTE — TELEPHONE ENCOUNTER
Spoke with patient. Explained to her the protocol for both procedures. Patient verbalized understanding.

## 2024-12-31 ENCOUNTER — LAB (OUTPATIENT)
Dept: LAB | Facility: HOSPITAL | Age: 69
End: 2024-12-31
Payer: MEDICARE

## 2024-12-31 DIAGNOSIS — I49.5 SICK SINUS SYNDROME: ICD-10-CM

## 2024-12-31 LAB
ALBUMIN SERPL-MCNC: 4.2 G/DL (ref 3.5–5.2)
ALBUMIN/GLOB SERPL: 1.6 G/DL
ALP SERPL-CCNC: 109 U/L (ref 39–117)
ALT SERPL W P-5'-P-CCNC: 26 U/L (ref 1–33)
ANION GAP SERPL CALCULATED.3IONS-SCNC: 8 MMOL/L (ref 5–15)
AST SERPL-CCNC: 23 U/L (ref 1–32)
BASOPHILS # BLD AUTO: 0.05 10*3/MM3 (ref 0–0.2)
BASOPHILS NFR BLD AUTO: 0.7 % (ref 0–1.5)
BILIRUB SERPL-MCNC: 0.3 MG/DL (ref 0–1.2)
BUN SERPL-MCNC: 19 MG/DL (ref 8–23)
BUN/CREAT SERPL: 20 (ref 7–25)
CALCIUM SPEC-SCNC: 9.1 MG/DL (ref 8.6–10.5)
CHLORIDE SERPL-SCNC: 105 MMOL/L (ref 98–107)
CO2 SERPL-SCNC: 28 MMOL/L (ref 22–29)
CREAT SERPL-MCNC: 0.95 MG/DL (ref 0.57–1)
DEPRECATED RDW RBC AUTO: 40 FL (ref 37–54)
EGFRCR SERPLBLD CKD-EPI 2021: 65.4 ML/MIN/1.73
EOSINOPHIL # BLD AUTO: 0.13 10*3/MM3 (ref 0–0.4)
EOSINOPHIL NFR BLD AUTO: 1.8 % (ref 0.3–6.2)
ERYTHROCYTE [DISTWIDTH] IN BLOOD BY AUTOMATED COUNT: 12.6 % (ref 12.3–15.4)
GLOBULIN UR ELPH-MCNC: 2.6 GM/DL
GLUCOSE SERPL-MCNC: 124 MG/DL (ref 65–99)
HCT VFR BLD AUTO: 44.3 % (ref 34–46.6)
HGB BLD-MCNC: 14.9 G/DL (ref 12–15.9)
IMM GRANULOCYTES # BLD AUTO: 0.04 10*3/MM3 (ref 0–0.05)
IMM GRANULOCYTES NFR BLD AUTO: 0.5 % (ref 0–0.5)
INR PPP: 1.23 (ref 0.9–1.1)
LYMPHOCYTES # BLD AUTO: 1.44 10*3/MM3 (ref 0.7–3.1)
LYMPHOCYTES NFR BLD AUTO: 19.6 % (ref 19.6–45.3)
MAGNESIUM SERPL-MCNC: 2.3 MG/DL (ref 1.6–2.4)
MCH RBC QN AUTO: 29.5 PG (ref 26.6–33)
MCHC RBC AUTO-ENTMCNC: 33.6 G/DL (ref 31.5–35.7)
MCV RBC AUTO: 87.7 FL (ref 79–97)
MONOCYTES # BLD AUTO: 0.58 10*3/MM3 (ref 0.1–0.9)
MONOCYTES NFR BLD AUTO: 7.9 % (ref 5–12)
NEUTROPHILS NFR BLD AUTO: 5.09 10*3/MM3 (ref 1.7–7)
NEUTROPHILS NFR BLD AUTO: 69.5 % (ref 42.7–76)
NRBC BLD AUTO-RTO: 0 /100 WBC (ref 0–0.2)
PLATELET # BLD AUTO: 193 10*3/MM3 (ref 140–450)
PMV BLD AUTO: 12.1 FL (ref 6–12)
POTASSIUM SERPL-SCNC: 4.4 MMOL/L (ref 3.5–5.2)
PROT SERPL-MCNC: 6.8 G/DL (ref 6–8.5)
PROTHROMBIN TIME: 15.6 SECONDS (ref 11.7–14.2)
RBC # BLD AUTO: 5.05 10*6/MM3 (ref 3.77–5.28)
SODIUM SERPL-SCNC: 141 MMOL/L (ref 136–145)
WBC NRBC COR # BLD AUTO: 7.33 10*3/MM3 (ref 3.4–10.8)

## 2024-12-31 PROCEDURE — 80053 COMPREHEN METABOLIC PANEL: CPT

## 2024-12-31 PROCEDURE — 83735 ASSAY OF MAGNESIUM: CPT

## 2024-12-31 PROCEDURE — 85025 COMPLETE CBC W/AUTO DIFF WBC: CPT

## 2024-12-31 PROCEDURE — 85610 PROTHROMBIN TIME: CPT

## 2024-12-31 PROCEDURE — 36415 COLL VENOUS BLD VENIPUNCTURE: CPT

## 2024-12-31 RX ORDER — LEVOTHYROXINE SODIUM 100 UG/1
100 TABLET ORAL TAKE AS DIRECTED
COMMUNITY

## 2025-01-02 ENCOUNTER — ANESTHESIA (OUTPATIENT)
Dept: CARDIOLOGY | Facility: HOSPITAL | Age: 70
End: 2025-01-02
Payer: MEDICARE

## 2025-01-02 ENCOUNTER — APPOINTMENT (OUTPATIENT)
Dept: GENERAL RADIOLOGY | Facility: HOSPITAL | Age: 70
End: 2025-01-02
Payer: MEDICARE

## 2025-01-02 ENCOUNTER — HOSPITAL ENCOUNTER (OUTPATIENT)
Facility: HOSPITAL | Age: 70
Setting detail: OBSERVATION
Discharge: HOME | End: 2025-01-03
Attending: INTERNAL MEDICINE | Admitting: INTERNAL MEDICINE
Payer: MEDICARE

## 2025-01-02 ENCOUNTER — ANESTHESIA EVENT (OUTPATIENT)
Dept: CARDIOLOGY | Facility: HOSPITAL | Age: 70
End: 2025-01-02
Payer: MEDICARE

## 2025-01-02 DIAGNOSIS — Z95.0 STATUS POST PLACEMENT OF CARDIAC PACEMAKER: ICD-10-CM

## 2025-01-02 DIAGNOSIS — I48.0 PAROXYSMAL ATRIAL FIBRILLATION: ICD-10-CM

## 2025-01-02 DIAGNOSIS — I49.5 SICK SINUS SYNDROME: ICD-10-CM

## 2025-01-02 DIAGNOSIS — Z86.79 STATUS POST ABLATION OF ATRIAL FIBRILLATION: Primary | ICD-10-CM

## 2025-01-02 DIAGNOSIS — Z98.890 STATUS POST ABLATION OF ATRIAL FIBRILLATION: Primary | ICD-10-CM

## 2025-01-02 LAB
ACT BLD: 233 SECONDS (ref 89–137)
ACT BLD: 262 SECONDS (ref 89–137)
ACT BLD: 325 SECONDS (ref 89–137)
ACT BLD: 343 SECONDS (ref 89–137)
GLUCOSE BLDC GLUCOMTR-MCNC: 131 MG/DL (ref 70–105)
MRSA DNA SPEC QL NAA+PROBE: NORMAL
QT INTERVAL: 416 MS
QTC INTERVAL: 478 MS

## 2025-01-02 PROCEDURE — 93005 ELECTROCARDIOGRAM TRACING: CPT | Performed by: INTERNAL MEDICINE

## 2025-01-02 PROCEDURE — C1766 INTRO/SHEATH,STRBLE,NON-PEEL: HCPCS | Performed by: INTERNAL MEDICINE

## 2025-01-02 PROCEDURE — 25010000002 GLYCOPYRROLATE 1 MG/5ML SOLUTION: Performed by: NURSE ANESTHETIST, CERTIFIED REGISTERED

## 2025-01-02 PROCEDURE — C1898 LEAD, PMKR, OTHER THAN TRANS: HCPCS | Performed by: INTERNAL MEDICINE

## 2025-01-02 PROCEDURE — 33208 INSRT HEART PM ATRIAL & VENT: CPT | Performed by: INTERNAL MEDICINE

## 2025-01-02 PROCEDURE — C1769 GUIDE WIRE: HCPCS | Performed by: INTERNAL MEDICINE

## 2025-01-02 PROCEDURE — 25010000002 SUGAMMADEX 200 MG/2ML SOLUTION: Performed by: NURSE ANESTHETIST, CERTIFIED REGISTERED

## 2025-01-02 PROCEDURE — 25010000002 HEPARIN (PORCINE) PER 1000 UNITS: Performed by: NURSE ANESTHETIST, CERTIFIED REGISTERED

## 2025-01-02 PROCEDURE — 82948 REAGENT STRIP/BLOOD GLUCOSE: CPT

## 2025-01-02 PROCEDURE — 87641 MR-STAPH DNA AMP PROBE: CPT | Performed by: INTERNAL MEDICINE

## 2025-01-02 PROCEDURE — C1730 CATH, EP, 19 OR FEW ELECT: HCPCS | Performed by: INTERNAL MEDICINE

## 2025-01-02 PROCEDURE — 93657 TX L/R ATRIAL FIB ADDL: CPT | Performed by: INTERNAL MEDICINE

## 2025-01-02 PROCEDURE — 25010000002 VANCOMYCIN 1 G RECONSTITUTED SOLUTION: Performed by: NURSE ANESTHETIST, CERTIFIED REGISTERED

## 2025-01-02 PROCEDURE — C1785 PMKR, DUAL, RATE-RESP: HCPCS | Performed by: INTERNAL MEDICINE

## 2025-01-02 PROCEDURE — G0378 HOSPITAL OBSERVATION PER HR: HCPCS

## 2025-01-02 PROCEDURE — 25010000002 DEXAMETHASONE PER 1 MG: Performed by: NURSE ANESTHETIST, CERTIFIED REGISTERED

## 2025-01-02 PROCEDURE — 25010000002 PHENYLEPHRINE 10 MG/ML SOLUTION 5 ML VIAL: Performed by: NURSE ANESTHETIST, CERTIFIED REGISTERED

## 2025-01-02 PROCEDURE — 93655 ICAR CATH ABLTJ DSCRT ARRHYT: CPT | Performed by: INTERNAL MEDICINE

## 2025-01-02 PROCEDURE — 25510000001 IOPAMIDOL PER 1 ML: Performed by: INTERNAL MEDICINE

## 2025-01-02 PROCEDURE — 93656 COMPRE EP EVAL ABLTJ ATR FIB: CPT | Performed by: INTERNAL MEDICINE

## 2025-01-02 PROCEDURE — C1894 INTRO/SHEATH, NON-LASER: HCPCS | Performed by: INTERNAL MEDICINE

## 2025-01-02 PROCEDURE — S0260 H&P FOR SURGERY: HCPCS | Performed by: INTERNAL MEDICINE

## 2025-01-02 PROCEDURE — C1733 CATH, EP, OTHR THAN COOL-TIP: HCPCS | Performed by: INTERNAL MEDICINE

## 2025-01-02 PROCEDURE — 85347 COAGULATION TIME ACTIVATED: CPT

## 2025-01-02 PROCEDURE — 25010000002 LIDOCAINE-EPINEPHRINE 2 %-1:100000 SOLUTION: Performed by: INTERNAL MEDICINE

## 2025-01-02 PROCEDURE — 25010000002 FENTANYL CITRATE (PF) 100 MCG/2ML SOLUTION: Performed by: NURSE ANESTHETIST, CERTIFIED REGISTERED

## 2025-01-02 PROCEDURE — C1732 CATH, EP, DIAG/ABL, 3D/VECT: HCPCS | Performed by: INTERNAL MEDICINE

## 2025-01-02 PROCEDURE — 93010 ELECTROCARDIOGRAM REPORT: CPT | Performed by: INTERNAL MEDICINE

## 2025-01-02 PROCEDURE — 25010000002 HEPARIN (PORCINE) 25000-0.45 UT/250ML-% SOLUTION: Performed by: NURSE ANESTHETIST, CERTIFIED REGISTERED

## 2025-01-02 PROCEDURE — 25010000002 PROPOFOL 1000 MG/100ML EMULSION: Performed by: NURSE ANESTHETIST, CERTIFIED REGISTERED

## 2025-01-02 PROCEDURE — 25010000002 CEFAZOLIN PER 500 MG: Performed by: NURSE ANESTHETIST, CERTIFIED REGISTERED

## 2025-01-02 PROCEDURE — 71045 X-RAY EXAM CHEST 1 VIEW: CPT

## 2025-01-02 PROCEDURE — 25810000003 SODIUM CHLORIDE 0.9 % SOLUTION: Performed by: NURSE ANESTHETIST, CERTIFIED REGISTERED

## 2025-01-02 PROCEDURE — C1759 CATH, INTRA ECHOCARDIOGRAPHY: HCPCS | Performed by: INTERNAL MEDICINE

## 2025-01-02 PROCEDURE — 25010000002 PROTAMINE SULFATE PER 10 MG: Performed by: NURSE ANESTHETIST, CERTIFIED REGISTERED

## 2025-01-02 PROCEDURE — 25810000003 SODIUM CHLORIDE 0.9 % SOLUTION 250 ML FLEX CONT: Performed by: NURSE ANESTHETIST, CERTIFIED REGISTERED

## 2025-01-02 PROCEDURE — 25010000002 HYDROMORPHONE 1 MG/ML SOLUTION: Performed by: NURSE ANESTHETIST, CERTIFIED REGISTERED

## 2025-01-02 PROCEDURE — 25010000002 SUCCINYLCHOLINE PER 20 MG: Performed by: NURSE ANESTHETIST, CERTIFIED REGISTERED

## 2025-01-02 PROCEDURE — C1887 CATHETER, GUIDING: HCPCS | Performed by: INTERNAL MEDICINE

## 2025-01-02 PROCEDURE — 25010000002 PROPOFOL 10 MG/ML EMULSION: Performed by: NURSE ANESTHETIST, CERTIFIED REGISTERED

## 2025-01-02 DEVICE — LD PM CAPSUREFIX NOVUS IS1 MEDTR 407652: Type: IMPLANTABLE DEVICE | Site: HEART | Status: FUNCTIONAL

## 2025-01-02 DEVICE — GEN PM AZURE XT SURESCAN DR MRI: Type: IMPLANTABLE DEVICE | Site: HEART | Status: FUNCTIONAL

## 2025-01-02 DEVICE — IMPLANTABLE DEVICE: Type: IMPLANTABLE DEVICE | Site: HEART | Status: FUNCTIONAL

## 2025-01-02 RX ORDER — NALOXONE HCL 0.4 MG/ML
0.4 VIAL (ML) INJECTION AS NEEDED
Status: DISCONTINUED | OUTPATIENT
Start: 2025-01-02 | End: 2025-01-02 | Stop reason: HOSPADM

## 2025-01-02 RX ORDER — NITROGLYCERIN 0.4 MG/1
0.4 TABLET SUBLINGUAL
Status: DISCONTINUED | OUTPATIENT
Start: 2025-01-02 | End: 2025-01-03 | Stop reason: HOSPADM

## 2025-01-02 RX ORDER — DIPHENHYDRAMINE HYDROCHLORIDE 50 MG/ML
12.5 INJECTION INTRAMUSCULAR; INTRAVENOUS ONCE AS NEEDED
Status: DISCONTINUED | OUTPATIENT
Start: 2025-01-02 | End: 2025-01-02 | Stop reason: HOSPADM

## 2025-01-02 RX ORDER — ONDANSETRON 2 MG/ML
4 INJECTION INTRAMUSCULAR; INTRAVENOUS EVERY 6 HOURS PRN
Status: DISCONTINUED | OUTPATIENT
Start: 2025-01-02 | End: 2025-01-03 | Stop reason: HOSPADM

## 2025-01-02 RX ORDER — ACETAMINOPHEN 650 MG/1
650 SUPPOSITORY RECTAL EVERY 4 HOURS PRN
Status: DISCONTINUED | OUTPATIENT
Start: 2025-01-02 | End: 2025-01-03 | Stop reason: HOSPADM

## 2025-01-02 RX ORDER — PHENYLEPHRINE HCL IN 0.9% NACL 1 MG/10 ML
SYRINGE (ML) INTRAVENOUS AS NEEDED
Status: DISCONTINUED | OUTPATIENT
Start: 2025-01-02 | End: 2025-01-02 | Stop reason: SURG

## 2025-01-02 RX ORDER — DIPHENHYDRAMINE HYDROCHLORIDE 50 MG/ML
12.5 INJECTION INTRAMUSCULAR; INTRAVENOUS
Status: DISCONTINUED | OUTPATIENT
Start: 2025-01-02 | End: 2025-01-02 | Stop reason: HOSPADM

## 2025-01-02 RX ORDER — SODIUM CHLORIDE 9 MG/ML
INJECTION, SOLUTION INTRAVENOUS CONTINUOUS PRN
Status: DISCONTINUED | OUTPATIENT
Start: 2025-01-02 | End: 2025-01-02 | Stop reason: SURG

## 2025-01-02 RX ORDER — HYDRALAZINE HYDROCHLORIDE 20 MG/ML
5 INJECTION INTRAMUSCULAR; INTRAVENOUS
Status: DISCONTINUED | OUTPATIENT
Start: 2025-01-02 | End: 2025-01-02 | Stop reason: HOSPADM

## 2025-01-02 RX ORDER — ACETAMINOPHEN 325 MG/1
650 TABLET ORAL EVERY 4 HOURS PRN
Status: DISCONTINUED | OUTPATIENT
Start: 2025-01-02 | End: 2025-01-03 | Stop reason: HOSPADM

## 2025-01-02 RX ORDER — GLYCOPYRROLATE 0.2 MG/ML
INJECTION INTRAMUSCULAR; INTRAVENOUS AS NEEDED
Status: DISCONTINUED | OUTPATIENT
Start: 2025-01-02 | End: 2025-01-02 | Stop reason: SURG

## 2025-01-02 RX ORDER — FENTANYL CITRATE 50 UG/ML
INJECTION, SOLUTION INTRAMUSCULAR; INTRAVENOUS AS NEEDED
Status: DISCONTINUED | OUTPATIENT
Start: 2025-01-02 | End: 2025-01-02 | Stop reason: SURG

## 2025-01-02 RX ORDER — HYDROCODONE BITARTRATE AND ACETAMINOPHEN 5; 325 MG/1; MG/1
1 TABLET ORAL EVERY 4 HOURS PRN
Status: DISCONTINUED | OUTPATIENT
Start: 2025-01-02 | End: 2025-01-03 | Stop reason: HOSPADM

## 2025-01-02 RX ORDER — PROTAMINE SULFATE 10 MG/ML
INJECTION, SOLUTION INTRAVENOUS AS NEEDED
Status: DISCONTINUED | OUTPATIENT
Start: 2025-01-02 | End: 2025-01-02 | Stop reason: SURG

## 2025-01-02 RX ORDER — ROCURONIUM BROMIDE 10 MG/ML
INJECTION, SOLUTION INTRAVENOUS AS NEEDED
Status: DISCONTINUED | OUTPATIENT
Start: 2025-01-02 | End: 2025-01-02 | Stop reason: SURG

## 2025-01-02 RX ORDER — HEPARIN SODIUM 1000 [USP'U]/ML
INJECTION, SOLUTION INTRAVENOUS; SUBCUTANEOUS AS NEEDED
Status: DISCONTINUED | OUTPATIENT
Start: 2025-01-02 | End: 2025-01-02 | Stop reason: SURG

## 2025-01-02 RX ORDER — LIDOCAINE HYDROCHLORIDE AND EPINEPHRINE BITARTRATE 20; .01 MG/ML; MG/ML
INJECTION, SOLUTION SUBCUTANEOUS
Status: DISCONTINUED | OUTPATIENT
Start: 2025-01-02 | End: 2025-01-02 | Stop reason: HOSPADM

## 2025-01-02 RX ORDER — POTASSIUM CHLORIDE 1.5 G/1.58G
20 POWDER, FOR SOLUTION ORAL DAILY
Status: DISCONTINUED | OUTPATIENT
Start: 2025-01-02 | End: 2025-01-03 | Stop reason: HOSPADM

## 2025-01-02 RX ORDER — EPHEDRINE SULFATE 5 MG/ML
5 INJECTION INTRAVENOUS ONCE AS NEEDED
Status: DISCONTINUED | OUTPATIENT
Start: 2025-01-02 | End: 2025-01-02 | Stop reason: HOSPADM

## 2025-01-02 RX ORDER — OXYCODONE HYDROCHLORIDE 5 MG/1
10 TABLET ORAL EVERY 4 HOURS PRN
Status: DISCONTINUED | OUTPATIENT
Start: 2025-01-02 | End: 2025-01-02 | Stop reason: HOSPADM

## 2025-01-02 RX ORDER — LOSARTAN POTASSIUM 50 MG/1
100 TABLET ORAL NIGHTLY
Status: DISCONTINUED | OUTPATIENT
Start: 2025-01-02 | End: 2025-01-03 | Stop reason: HOSPADM

## 2025-01-02 RX ORDER — AMLODIPINE BESYLATE 5 MG/1
5 TABLET ORAL DAILY
Status: DISCONTINUED | OUTPATIENT
Start: 2025-01-03 | End: 2025-01-03 | Stop reason: HOSPADM

## 2025-01-02 RX ORDER — ONDANSETRON 2 MG/ML
4 INJECTION INTRAMUSCULAR; INTRAVENOUS ONCE AS NEEDED
Status: DISCONTINUED | OUTPATIENT
Start: 2025-01-02 | End: 2025-01-02 | Stop reason: HOSPADM

## 2025-01-02 RX ORDER — LEVOTHYROXINE SODIUM 100 UG/1
100 TABLET ORAL
Status: DISCONTINUED | OUTPATIENT
Start: 2025-01-03 | End: 2025-01-03 | Stop reason: HOSPADM

## 2025-01-02 RX ORDER — LABETALOL HYDROCHLORIDE 5 MG/ML
5 INJECTION, SOLUTION INTRAVENOUS
Status: DISCONTINUED | OUTPATIENT
Start: 2025-01-02 | End: 2025-01-02 | Stop reason: HOSPADM

## 2025-01-02 RX ORDER — PROPOFOL 10 MG/ML
INJECTION, EMULSION INTRAVENOUS CONTINUOUS PRN
Status: DISCONTINUED | OUTPATIENT
Start: 2025-01-02 | End: 2025-01-02 | Stop reason: SURG

## 2025-01-02 RX ORDER — VANCOMYCIN HYDROCHLORIDE 1 G/20ML
INJECTION, POWDER, LYOPHILIZED, FOR SOLUTION INTRAVENOUS AS NEEDED
Status: DISCONTINUED | OUTPATIENT
Start: 2025-01-02 | End: 2025-01-02 | Stop reason: SURG

## 2025-01-02 RX ORDER — DEXAMETHASONE SODIUM PHOSPHATE 4 MG/ML
INJECTION, SOLUTION INTRA-ARTICULAR; INTRALESIONAL; INTRAMUSCULAR; INTRAVENOUS; SOFT TISSUE AS NEEDED
Status: DISCONTINUED | OUTPATIENT
Start: 2025-01-02 | End: 2025-01-02 | Stop reason: SURG

## 2025-01-02 RX ORDER — IOPAMIDOL 755 MG/ML
INJECTION, SOLUTION INTRAVASCULAR
Status: DISCONTINUED | OUTPATIENT
Start: 2025-01-02 | End: 2025-01-02 | Stop reason: HOSPADM

## 2025-01-02 RX ORDER — SUCCINYLCHOLINE CHLORIDE 20 MG/ML
INJECTION INTRAMUSCULAR; INTRAVENOUS AS NEEDED
Status: DISCONTINUED | OUTPATIENT
Start: 2025-01-02 | End: 2025-01-02 | Stop reason: SURG

## 2025-01-02 RX ORDER — HYDROCODONE BITARTRATE AND ACETAMINOPHEN 7.5; 325 MG/1; MG/1
2 TABLET ORAL EVERY 4 HOURS PRN
Status: DISCONTINUED | OUTPATIENT
Start: 2025-01-02 | End: 2025-01-03 | Stop reason: HOSPADM

## 2025-01-02 RX ORDER — ROSUVASTATIN CALCIUM 5 MG/1
5 TABLET, COATED ORAL 3 TIMES WEEKLY
Status: DISCONTINUED | OUTPATIENT
Start: 2025-01-03 | End: 2025-01-03 | Stop reason: HOSPADM

## 2025-01-02 RX ORDER — PROPOFOL 10 MG/ML
VIAL (ML) INTRAVENOUS AS NEEDED
Status: DISCONTINUED | OUTPATIENT
Start: 2025-01-02 | End: 2025-01-02 | Stop reason: SURG

## 2025-01-02 RX ORDER — FLUMAZENIL 0.1 MG/ML
0.2 INJECTION INTRAVENOUS AS NEEDED
Status: DISCONTINUED | OUTPATIENT
Start: 2025-01-02 | End: 2025-01-02 | Stop reason: HOSPADM

## 2025-01-02 RX ORDER — IPRATROPIUM BROMIDE AND ALBUTEROL SULFATE 2.5; .5 MG/3ML; MG/3ML
3 SOLUTION RESPIRATORY (INHALATION) ONCE AS NEEDED
Status: DISCONTINUED | OUTPATIENT
Start: 2025-01-02 | End: 2025-01-02 | Stop reason: HOSPADM

## 2025-01-02 RX ORDER — OXYCODONE HYDROCHLORIDE 5 MG/1
5 TABLET ORAL ONCE AS NEEDED
Status: DISCONTINUED | OUTPATIENT
Start: 2025-01-02 | End: 2025-01-02 | Stop reason: HOSPADM

## 2025-01-02 RX ORDER — HEPARIN SODIUM 10000 [USP'U]/100ML
INJECTION, SOLUTION INTRAVENOUS CONTINUOUS PRN
Status: DISCONTINUED | OUTPATIENT
Start: 2025-01-02 | End: 2025-01-02 | Stop reason: SURG

## 2025-01-02 RX ADMIN — HEPARIN SODIUM 8690 UNITS: 1000 INJECTION INTRAVENOUS; SUBCUTANEOUS at 08:43

## 2025-01-02 RX ADMIN — PHENYLEPHRINE HYDROCHLORIDE 0.5 MCG/KG/MIN: 10 INJECTION INTRAVENOUS at 08:36

## 2025-01-02 RX ADMIN — HEPARIN SODIUM 18 UNITS/KG/HR: 10000 INJECTION, SOLUTION INTRAVENOUS at 08:44

## 2025-01-02 RX ADMIN — SODIUM CHLORIDE: 9 INJECTION, SOLUTION INTRAVENOUS at 08:02

## 2025-01-02 RX ADMIN — GLYCOPYRROLATE 0.2 MCG: 0.2 INJECTION INTRAMUSCULAR; INTRAVENOUS at 08:20

## 2025-01-02 RX ADMIN — LOSARTAN POTASSIUM 100 MG: 50 TABLET, FILM COATED ORAL at 20:05

## 2025-01-02 RX ADMIN — ROCURONIUM BROMIDE 20 MG: 10 INJECTION, SOLUTION INTRAVENOUS at 11:10

## 2025-01-02 RX ADMIN — Medication 100 MCG: at 11:02

## 2025-01-02 RX ADMIN — HYDROCODONE BITARTRATE AND ACETAMINOPHEN 2 TABLET: 7.5; 325 TABLET ORAL at 23:55

## 2025-01-02 RX ADMIN — PROPOFOL 150 MG: 10 INJECTION, EMULSION INTRAVENOUS at 08:10

## 2025-01-02 RX ADMIN — HYDROMORPHONE HYDROCHLORIDE 0.5 MG: 1 INJECTION, SOLUTION INTRAMUSCULAR; INTRAVENOUS; SUBCUTANEOUS at 13:13

## 2025-01-02 RX ADMIN — DEXAMETHASONE SODIUM PHOSPHATE 4 MG: 4 INJECTION, SOLUTION INTRAMUSCULAR; INTRAVENOUS at 10:36

## 2025-01-02 RX ADMIN — VANCOMYCIN HYDROCHLORIDE 1000 MG: 1025 INJECTION, POWDER, FOR SOLUTION INTRAVENOUS; ORAL at 10:54

## 2025-01-02 RX ADMIN — FENTANYL CITRATE 50 MCG: 50 INJECTION, SOLUTION INTRAMUSCULAR; INTRAVENOUS at 08:10

## 2025-01-02 RX ADMIN — HEPARIN SODIUM 5000 UNITS: 1000 INJECTION INTRAVENOUS; SUBCUTANEOUS at 09:27

## 2025-01-02 RX ADMIN — Medication 100 MCG: at 10:48

## 2025-01-02 RX ADMIN — HYDROCODONE BITARTRATE AND ACETAMINOPHEN 1 TABLET: 5; 325 TABLET ORAL at 19:48

## 2025-01-02 RX ADMIN — FENTANYL CITRATE 50 MCG: 50 INJECTION, SOLUTION INTRAMUSCULAR; INTRAVENOUS at 09:41

## 2025-01-02 RX ADMIN — ROCURONIUM BROMIDE 40 MG: 10 INJECTION, SOLUTION INTRAVENOUS at 08:22

## 2025-01-02 RX ADMIN — SUGAMMADEX 200 MG: 100 INJECTION, SOLUTION INTRAVENOUS at 12:34

## 2025-01-02 RX ADMIN — PROTAMINE SULFATE 50 MG: 10 INJECTION, SOLUTION INTRAVENOUS at 10:44

## 2025-01-02 RX ADMIN — PROPOFOL INJECTABLE EMULSION 150 MCG/KG/MIN: 10 INJECTION, EMULSION INTRAVENOUS at 08:11

## 2025-01-02 RX ADMIN — CEFAZOLIN 2 G: 2 INJECTION, POWDER, FOR SOLUTION INTRAMUSCULAR; INTRAVENOUS at 10:44

## 2025-01-02 RX ADMIN — ROCURONIUM BROMIDE 10 MG: 10 INJECTION, SOLUTION INTRAVENOUS at 09:07

## 2025-01-02 RX ADMIN — HEPARIN SODIUM 4000 UNITS: 1000 INJECTION INTRAVENOUS; SUBCUTANEOUS at 09:07

## 2025-01-02 RX ADMIN — SUCCINYLCHOLINE CHLORIDE 160 MG: 20 INJECTION, SOLUTION INTRAMUSCULAR; INTRAVENOUS at 08:10

## 2025-01-02 RX ADMIN — ROCURONIUM BROMIDE 10 MG: 10 INJECTION, SOLUTION INTRAVENOUS at 08:10

## 2025-01-02 RX ADMIN — ROCURONIUM BROMIDE 20 MG: 10 INJECTION, SOLUTION INTRAVENOUS at 09:41

## 2025-01-02 RX ADMIN — POTASSIUM CHLORIDE 20 MEQ: 1.5 POWDER, FOR SOLUTION ORAL at 16:33

## 2025-01-02 NOTE — Clinical Note
A 7 fr sheath was successfully inserted using micropuncture technique into the left subclavian vein.

## 2025-01-02 NOTE — PLAN OF CARE
Goal Outcome Evaluation:      Pt had ablation and pacemaker placed today. Pt up to room 271 with no issues at this time. Pt cath site assessed , Clean dry intact dressing. Pt complains of mild discomfort when moving around. Pt currently on Room air.

## 2025-01-02 NOTE — ANESTHESIA PROCEDURE NOTES
Airway  Urgency: elective    Date/Time: 1/2/2025 8:15 AM    General Information and Staff    Patient location during procedure: OR  CRNA/CAA: Aye Chun CRNA    Indications and Patient Condition  Indications for airway management: airway protection    Preoxygenated: yes  Mask difficulty assessment: 1 - vent by mask    Final Airway Details  Final airway type: endotracheal airway      Successful airway: ETT  Cuffed: yes   Successful intubation technique: video laryngoscopy  Facilitating devices/methods: intubating stylet  Endotracheal tube insertion site: oral  Blade: Harris  Blade size: 3  ETT size (mm): 7.0  Cormack-Lehane Classification: grade I - full view of glottis  Placement verified by: chest auscultation and capnometry   Measured from: lips  ETT/EBT  to lips (cm): 21  Number of attempts at approach: 1  Assessment: lips, teeth, and gum same as pre-op and atraumatic intubation

## 2025-01-02 NOTE — H&P
History of present illness  Joi Cheng is a 68-year-old female patient who is here today for evaluation of bradycardia as well as atrial fibrillation.  Patient has been complaining of fatigue and lack of energy for some time now and oftentimes her heart rate runs in the low 50s.     Medical History        Past Medical History:   Diagnosis Date    Abnormal ECG 6/2/2023    Arthritis       Rheumatoid    Diabetes mellitus      Disease of thyroid gland      Hyperlipidemia      Hypertension      New onset atrial fibrillation 10/5/2024    PONV (postoperative nausea and vomiting)           Surgical History         Past Surgical History:   Procedure Laterality Date    ABDOMINAL SURGERY        CHOLECYSTECTOMY        FINGER SURGERY        HYSTERECTOMY        INCISION AND DRAINAGE OF WOUND Right 04/06/2021     Procedure: Incision and drainage of abscess of right face/cheek;  Surgeon: Ariel Hernandes DO;  Location: [facility] OR;  Service: General;  Laterality: Right;    NECK SURGERY         April 2024    OOPHORECTOMY        THYROID SURGERY        TUBAL ABDOMINAL LIGATION         1 tube removed               Family History   Problem Relation Age of Onset    Diabetes Mother      Heart attack Mother      Heart disease Mother      Heart failure Mother      COPD Father      Arrhythmia Father        Social History   Social History            Tobacco Use    Smoking status: Never       Passive exposure: Never    Smokeless tobacco: Never   Vaping Use    Vaping status: Never Used   Substance Use Topics    Alcohol use: Not Currently       Comment: Rarely do I drink    Drug use: Never           Prescriptions Prior to Admission   (Not in a hospital admission)      Allergies:  Lisinopril     Review of systems     Constitutional: Negative.    Respiratory and cardiovascular: As detailed in HPI section.  Gastrointestinal: Negative for constipation, nausea and vomiting negative for abdominal distention, abdominal pain and diarrhea.    Genitourinary: Negative for difficulty urinating and flank pain.   Musculoskeletal: Negative for arthralgias, joint swelling and myalgias.   Skin: Negative for color change, rash and wound.   Neurological: Negative for dizziness, syncope, weakness and headaches.   Hematological: Negative for adenopathy.   Psychiatric/Behavioral: Negative for confusion.   All other systems reviewed and are negative.     Physical Exam  VITALS REVIEWED     General:      well developed, in no acute distress.    Head:      normocephalic and atraumatic.    Eyes:      PERRL/EOM intact, conjunctiva and sclera clear with out nystagmus.    Neck:      no masses, thyromegaly,  trachea central with normal respiratory effort and PMI displaced laterally  Lungs:      Clear to auscultation bilaterally  Heart:       Regular rate and rhythm  Msk:      no deformity or scoliosis noted of thoracic or lumbar spine.    Pulses:      pulses normal in all 4 extremities.    Extremities:       No lower extremity edema  Neurologic:      no focal deficits.   alert oriented x3  Skin:      intact without lesions or rashes.    Psych:      alert and cooperative; normal mood and affect; normal attention span and concentration.          Result Review  :                    Pertinent cardiac workup     EKG 11/12/2024 sinus rhythm  Event monitor 27 days and 22 hours starting on 9/17/2024 showed atrial fibrillation with paroxysmal atrial fibrillation and atrial flutter.  Both arrhythmias seen.  She also has episodes of sinus bradycardia, lowest heart rate was 37 bpm.  Also a 3.5-second conversion pause was seen.        Procedures         Assessment  Assessment and Plan       Joi Cheng is a 68-year-old female patient who is here today for evaluation of essentially sick sinus syndrome.  Patient has bradycardic episodes but also both A-fib and atrial flutter.  Certainly at this time antiarrhythmics alone cannot be used due to bradycardic events.  Today we discussed  about rhythm management options.  In her case I think the best option is to do a combined procedure for A-fib/atrial flutter ablation followed by dual-chamber direct conduction system pacemaker implantation.  This way we can address both the arrhythmia and bradycardia.  Risk and benefits and indications for both procedures were discussed at length with her and she is agreeable.  After the procedures, we can start her on an antiarrhythmic perhaps for a short duration.     Diagnoses and all orders for this visit:     1. Paroxysmal atrial fibrillation (Primary)     2. Sick sinus syndrome     3. Essential hypertension

## 2025-01-02 NOTE — ANESTHESIA PREPROCEDURE EVALUATION
Anesthesia Evaluation     Patient summary reviewed and Nursing notes reviewed   history of anesthetic complications:  PONV  NPO Solid Status: > 8 hours  NPO Liquid Status: > 8 hours           Airway   Mallampati: II  TM distance: >3 FB  Neck ROM: full  No difficulty expected  Dental - normal exam     Pulmonary - normal exam   (-) not a smoker  Cardiovascular - normal exam    ECG reviewed    (+) hypertension, dysrhythmias Atrial Fib, hyperlipidemia      Neuro/Psych  (+) syncope  GI/Hepatic/Renal/Endo    (+) diabetes mellitus, thyroid problem hypothyroidism    Musculoskeletal     Abdominal  - normal exam    Bowel sounds: normal.   Substance History      OB/GYN          Other   arthritis,     ROS/Med Hx Other: Sinus or ectopic atrial rhythm  Probable left atrial enlargement  Left anterior fascicular block                Anesthesia Plan    ASA 3     general     intravenous induction     Anesthetic plan, risks, benefits, and alternatives have been provided, discussed and informed consent has been obtained with: patient.    Plan discussed with CRNA.

## 2025-01-02 NOTE — ANESTHESIA POSTPROCEDURE EVALUATION
Patient: Joi Cheng    Procedure Summary       Date: 01/02/25 Room / Location: [facility]ATH [facility] 3 / [facility] CATH INVASIVE LOCATION    Anesthesia Start: 0802 Anesthesia Stop: 1254    Procedures:       Ablation atrial fibrillation and flutter. PFA - REPS EMAILED      Pacemaker DC new, Mdt 3830 - REPS EMAILED Diagnosis:       Paroxysmal atrial fibrillation      (afib, flutter)    Providers: Carleen Iraheta MD Provider: Breanna Nieto MD    Anesthesia Type: general ASA Status: 3            Anesthesia Type: general    Vitals  Vitals Value Taken Time   /59 01/02/25 1350   Temp 99 °F (37.2 °C) 01/02/25 1350   Pulse 74 01/02/25 1350   Resp 16 01/02/25 1350   SpO2 96 % 01/02/25 1350           Post Anesthesia Care and Evaluation    Patient location during evaluation: PACU  Patient participation: complete - patient participated  Level of consciousness: awake  Pain score: 0  Pain management: adequate  Anesthetic complications: No anesthetic complications  PONV Status: none  Cardiovascular status: acceptable  Respiratory status: acceptable  Hydration status: acceptable

## 2025-01-02 NOTE — Clinical Note
08/15/2023  Patient Information  David Mandujano                                                                                          [address]   [place] [address]   1949  'PCP/Referring Physician'  Raj Wang MD  [phone]  Lilly Matos APRN  [phone]  Chief Complaint   Patient presents with    Consult     NP per Lilly BRANCH for Bilateral Carotid Stenosis-Complained of SOB / Fatigue       History of Present Illness: 74-year-old  male with a history of hypertension, hyperlipidemia, hypertrophic cardiomyopathy status post transseptal alcohol ablation, colon cancer status post resection and active chewing tobacco abuse who presents with carotid artery stenosis.  The patient denies vision loss, speaking difficulties or focal weakness.      Patient Active Problem List   Diagnosis    Iron deficiency anemia, unspecified    Rectal cancer    Preoperative cardiovascular examination    Hypertrophic cardiomyopathy with LV OT gradient of about 68 mmHg in February 2019.    Essential hypertension    Port-A-Cath in place    Iron deficiency anemia due to chronic blood loss    Malabsorption of iron     Past Medical History:   Diagnosis Date    Anemia     Arthritis     Carotid stenosis     Colon cancer     s/p chemo therapy    Heart murmur     Hyperlipidemia     Hypertension     Wrist fracture     surgically repaired     Past Surgical History:   Procedure Laterality Date    ABDOMINAL SURGERY      CARDIAC ABLATION      COLON SURGERY      COLONOSCOPY      FRACTURE SURGERY Left     VENOUS ACCESS DEVICE (PORT) INSERTION N/A 05/20/2019    Procedure: INSERTION VENOUS ACCESS DEVICE;  Surgeon: Cindy Corrales MD;  Location: [facility];  Service: General    WRIST SURGERY         Current Outpatient Medications:     allopurinol (ZYLOPRIM) 100 MG tablet, Take 1 tablet by mouth Daily. (Patient taking differently: Take 1 tablet by mouth As Needed.), Disp: 30 tablet, Rfl: 0    aspirin 81 MG EC  Intracardiac echocardiography device inserted tablet, Take 1 tablet by mouth Daily., Disp: 30 tablet, Rfl: 5    atorvastatin (LIPITOR) 20 MG tablet, Take 1 tablet by mouth Daily. for cholesterol., Disp: 30 tablet, Rfl: 5    cyanocobalamin (VITAMIN B-12) 1000 MCG tablet, Take 1 tablet by mouth Daily., Disp: , Rfl:     folic acid (FOLVITE) 1 MG tablet, TAKE ONE TABLET BY MOUTH DAILY, Disp: 30 tablet, Rfl: 11    lisinopril (PRINIVIL,ZESTRIL) 20 MG tablet, Take 1 tablet by mouth Daily. FOR BLOOD PRESSURE, Disp: 30 tablet, Rfl: 5    metoprolol succinate XL (TOPROL-XL) 100 MG 24 hr tablet, TAKE ONE TABLET BY MOUTH DAILY FOR HEART AND/OR BLOOD PRESSURE, Disp: 30 tablet, Rfl: 5    vitamin B-6 (PYRIDOXINE) 100 MG tablet, TAKE ONE TABLET BY MOUTH DAILY, Disp: 30 tablet, Rfl: 5    amLODIPine (NORVASC) 10 MG tablet, Take 1 tablet by mouth Daily. (Patient not taking: Reported on 8/15/2023), Disp: 30 tablet, Rfl: 11    ASCORBIC ACID PO, Take  by mouth Daily., Disp: , Rfl:   No Known Allergies  Social History     Socioeconomic History    Marital status: Single    Number of children: 2   Tobacco Use    Smoking status: Some Days     Types: Cigars    Smokeless tobacco: Current     Types: Chew    Tobacco comments:     Occasional Cigars-Has not had one in 5 years   Vaping Use    Vaping Use: Never used   Substance and Sexual Activity    Alcohol use: Yes     Comment: occasional-Marin    Drug use: No    Sexual activity: Defer     Family History   Problem Relation Age of Onset    Stroke Father     Hyperlipidemia Father     Other Sister     Heart disease Sister     Colon cancer Maternal Grandfather      Review of Systems   Constitutional: Positive for malaise/fatigue. Negative for chills, fever, night sweats and weight loss.   HENT:  Negative for hearing loss, odynophagia and sore throat.    Cardiovascular:  Positive for dyspnea on exertion. Negative for chest pain, leg swelling, orthopnea and palpitations.   Respiratory:  Negative for cough and hemoptysis.    Endocrine: Negative for  "cold intolerance, heat intolerance, polydipsia, polyphagia and polyuria.   Hematologic/Lymphatic: Bruises/bleeds easily.   Skin:  Negative for itching and rash.   Musculoskeletal:  Negative for joint pain, joint swelling and myalgias.   Gastrointestinal:  Negative for abdominal pain, constipation, diarrhea, hematemesis, hematochezia, melena, nausea and vomiting.   Genitourinary:  Negative for dysuria, frequency and hematuria.   Neurological:  Negative for focal weakness, headaches, numbness and seizures.   Psychiatric/Behavioral:  Negative for suicidal ideas.    All other systems reviewed and are negative.  Vitals:    08/15/23 1140   BP: 170/90   BP Location: Left arm   Patient Position: Sitting   Pulse: 99   Temp: 97.9 øF (36.6 øC)   SpO2: 96%   Weight: 123 kg (271 lb)   Height: 182.9 cm (72\")      Physical Exam  Vitals reviewed.   Constitutional:       General: He is not in acute distress.     Appearance: He is well-developed. He is not diaphoretic.      Comments:  male who appears stated age and is present with his friend Serafin CARTER:      Head: Normocephalic and atraumatic.   Eyes:      General: No scleral icterus.     Conjunctiva/sclera: Conjunctivae normal.   Neck:      Vascular: No JVD.      Trachea: No tracheal deviation.   Cardiovascular:      Rate and Rhythm: Normal rate and regular rhythm.      Heart sounds: Normal heart sounds. No murmur heard.    No friction rub. No gallop.   Pulmonary:      Effort: Pulmonary effort is normal. No respiratory distress.      Breath sounds: Normal breath sounds. No wheezing or rales.   Abdominal:      General: There is no distension.      Palpations: Abdomen is soft. There is no mass.      Tenderness: There is no abdominal tenderness. There is no guarding or rebound.   Musculoskeletal:         General: Normal range of motion.      Cervical back: Neck supple.   Skin:     General: Skin is warm and dry.      Findings: No erythema or rash.   Neurological:      Mental " "Status: He is alert and oriented to person, place, and time.   Psychiatric:         Behavior: Behavior normal.         Thought Content: Thought content normal.         Judgment: Judgment normal.       The ROS, past medical history, surgical history, family history, social history, and vitals were reviewed by myself and corrected as needed.      Labs/Imaging:  -CTA of the carotids performed 8/2/2023, personally reviewed, demonstrates \"nearly 70% stenosis of the left internal carotid artery and greater than 75% stenosis of the right internal carotid artery on radiology interpretation.  -Carotid duplex performed 4/20/2023, personally reviewed, demonstrates 50 to 69% stenosis in the bilateral internal carotid arteries with a peak systolic velocity of 149 cm/s in the right internal carotid artery and ICA to CCA ratio 2.1.  The left internal carotid artery has a peak systolic velocity of 133 cm/s with an ICA to CCA ratio 1.2.    Assessment/Plan:  74-year-old  male with a history of hypertension, hyperlipidemia, hypertrophic cardiomyopathy status post transseptal alcohol ablation, colon cancer status post resection and active chewing tobacco abuse who presents with carotid artery stenosis.  The patient has asymptomatic carotid artery disease that is not hemodynamically significant on carotid duplex.  He does have significant calcifications present on CT scan at the carotid bulb and proximal internal carotid artery.  I discussed with the patient the importance of tobacco cessation in the setting of hypertension and carotid artery stenosis.  At this time, his carotid stenosis warrants observation with a repeat duplex in 1 year.  We discussed his future stroke risk.  I will have the patient return to clinic in 1 year to discuss the results of his repeat ultrasound.    Patient Active Problem List   Diagnosis    Iron deficiency anemia, unspecified    Rectal cancer    Preoperative cardiovascular examination    " Hypertrophic cardiomyopathy with LV OT gradient of about 68 mmHg in February 2019.    Essential hypertension    Port-A-Cath in place    Iron deficiency anemia due to chronic blood loss    Malabsorption of iron

## 2025-01-02 NOTE — Clinical Note
Right groin sheath site. Site soft, Dsg CDI, no s/s of bleeding or hematoma noted. Site checked Q 15 min during pacemaker insertion.

## 2025-01-03 VITALS
BODY MASS INDEX: 29.62 KG/M2 | HEIGHT: 64 IN | HEART RATE: 74 BPM | WEIGHT: 173.5 LBS | DIASTOLIC BLOOD PRESSURE: 75 MMHG | RESPIRATION RATE: 20 BRPM | TEMPERATURE: 98 F | OXYGEN SATURATION: 96 % | SYSTOLIC BLOOD PRESSURE: 141 MMHG

## 2025-01-03 LAB
ANION GAP SERPL CALCULATED.3IONS-SCNC: 9.1 MMOL/L (ref 5–15)
BUN SERPL-MCNC: 25 MG/DL (ref 8–23)
BUN/CREAT SERPL: 21.9 (ref 7–25)
CALCIUM SPEC-SCNC: 8.5 MG/DL (ref 8.6–10.5)
CHLORIDE SERPL-SCNC: 106 MMOL/L (ref 98–107)
CO2 SERPL-SCNC: 25.9 MMOL/L (ref 22–29)
CREAT SERPL-MCNC: 1.14 MG/DL (ref 0.57–1)
DEPRECATED RDW RBC AUTO: 46.4 FL (ref 37–54)
EGFRCR SERPLBLD CKD-EPI 2021: 52.5 ML/MIN/1.73
ERYTHROCYTE [DISTWIDTH] IN BLOOD BY AUTOMATED COUNT: 13.8 % (ref 12.3–15.4)
GLUCOSE SERPL-MCNC: 155 MG/DL (ref 65–99)
HCT VFR BLD AUTO: 42.2 % (ref 34–46.6)
HGB BLD-MCNC: 13.3 G/DL (ref 12–15.9)
MCH RBC QN AUTO: 28.8 PG (ref 26.6–33)
MCHC RBC AUTO-ENTMCNC: 31.5 G/DL (ref 31.5–35.7)
MCV RBC AUTO: 91.3 FL (ref 79–97)
PLATELET # BLD AUTO: 167 10*3/MM3 (ref 140–450)
PMV BLD AUTO: 12 FL (ref 6–12)
POTASSIUM SERPL-SCNC: 3.9 MMOL/L (ref 3.5–5.2)
RBC # BLD AUTO: 4.62 10*6/MM3 (ref 3.77–5.28)
SODIUM SERPL-SCNC: 141 MMOL/L (ref 136–145)
WBC NRBC COR # BLD AUTO: 10.26 10*3/MM3 (ref 3.4–10.8)

## 2025-01-03 PROCEDURE — 25810000003 SODIUM CHLORIDE 0.9 % SOLUTION 250 ML FLEX CONT: Performed by: INTERNAL MEDICINE

## 2025-01-03 PROCEDURE — 80048 BASIC METABOLIC PNL TOTAL CA: CPT | Performed by: NURSE PRACTITIONER

## 2025-01-03 PROCEDURE — G0378 HOSPITAL OBSERVATION PER HR: HCPCS

## 2025-01-03 PROCEDURE — 25010000002 VANCOMYCIN HCL 1.25 G RECONSTITUTED SOLUTION 1 EACH VIAL: Performed by: INTERNAL MEDICINE

## 2025-01-03 PROCEDURE — 85027 COMPLETE CBC AUTOMATED: CPT | Performed by: NURSE PRACTITIONER

## 2025-01-03 PROCEDURE — 99239 HOSP IP/OBS DSCHRG MGMT >30: CPT | Performed by: NURSE PRACTITIONER

## 2025-01-03 RX ORDER — FLECAINIDE ACETATE 50 MG/1
50 TABLET ORAL 2 TIMES DAILY
Qty: 60 TABLET | Refills: 11 | Status: SHIPPED | OUTPATIENT
Start: 2025-01-03

## 2025-01-03 RX ORDER — CEPHALEXIN 500 MG/1
500 CAPSULE ORAL 2 TIMES DAILY
Qty: 10 CAPSULE | Refills: 0 | Status: SHIPPED | OUTPATIENT
Start: 2025-01-03 | End: 2025-01-08

## 2025-01-03 RX ORDER — HYDROCODONE BITARTRATE AND ACETAMINOPHEN 5; 325 MG/1; MG/1
1 TABLET ORAL EVERY 6 HOURS PRN
Qty: 12 TABLET | Refills: 0 | Status: SHIPPED | OUTPATIENT
Start: 2025-01-03 | End: 2025-01-06

## 2025-01-03 RX ADMIN — AMLODIPINE BESYLATE 5 MG: 5 TABLET ORAL at 08:23

## 2025-01-03 RX ADMIN — VANCOMYCIN HYDROCHLORIDE 1250 MG: 1.25 INJECTION, POWDER, LYOPHILIZED, FOR SOLUTION INTRAVENOUS at 00:06

## 2025-01-03 RX ADMIN — LEVOTHYROXINE SODIUM 100 MCG: 100 TABLET ORAL at 05:24

## 2025-01-03 RX ADMIN — ROSUVASTATIN CALCIUM 5 MG: 5 TABLET, COATED ORAL at 08:23

## 2025-01-03 NOTE — DISCHARGE INSTRUCTIONS
----- Message from Amparo Garcia sent at 1/5/2023  2:21 PM CST -----  Contact: [phone] pt  Pt is requesting to have blood work orders put in and scheduled for the week of 04/24 early morning at the [facility] location. Please call.          Thank you      Do not raise left arm ABOVE shoulder for 1 month.  No heavy lifting (greater than 10 lbs) or driving for 2 weeks.  Ok to shower in 3 days. Steri-strips may get wet.   No swimming or hot tub use for 1 month.  Use ice packs for swelling or pain.  Monitor for infection at incision site (redness, pain, oozing, fever, etc)  Take all of antibiotic (may take with food if upset stomach occurs)

## 2025-01-03 NOTE — CASE MANAGEMENT/SOCIAL WORK
Discharge Planning Assessment   Anam     Patient Name: Joi Cheng  MRN: [id number]  Today's Date: 1/3/2025    Admit Date: 1/2/2025    Plan: Anticipate routine home alone   Discharge Needs Assessment       Row Name 01/03/25 1153       Living Environment    People in Home alone    Current Living Arrangements home    Potentially Unsafe Housing Conditions none    In the past 12 months has the electric, gas, oil, or water company threatened to shut off services in your home? No    Primary Care Provided by self    Provides Primary Care For no one    Family Caregiver if Needed child(reginald), adult    Family Caregiver Names Kristopher, zhane    Quality of Family Relationships helpful;involved    Able to Return to Prior Arrangements yes       Resource/Environmental Concerns    Resource/Environmental Concerns none    Transportation Concerns none       Transportation Needs    In the past 12 months, has lack of transportation kept you from medical appointments or from getting medications? no    In the past 12 months, has lack of transportation kept you from meetings, work, or from getting things needed for daily living? No       Food Insecurity    Within the past 12 months, you worried that your food would run out before you got the money to buy more. Never true    Within the past 12 months, the food you bought just didn't last and you didn't have money to get more. Never true       Transition Planning    Patient/Family Anticipates Transition to home with family    Patient/Family Anticipated Services at Transition none    Transportation Anticipated family or friend will provide       Discharge Needs Assessment    Readmission Within the Last 30 Days no previous admission in last 30 days    Equipment Currently Used at Home bp cuff;glucometer    Concerns to be Addressed denies needs/concerns at this time    Anticipated Changes Related to Illness none    Equipment Needed After Discharge none                   Discharge Plan        Row Name 01/03/25 1154       Plan    Plan Anticipate routine home alone    Patient/Family in Agreement with Plan yes    Plan Comments CM met with patient at bedside. Confirmed PCP, insurance, and pharmacy.  Meds to beds enrolled. Patient denies any difficulty affording medications. Patient not current with any therapies. Confirmed transportation at discharge will be her son or a friend. She does live at home alone.  Patient reports independent with ADL's and drives. Plan to discharge home today.                  Continued Care and Services - Admitted Since 1/2/2025    No active coordination exists for this encounter.       Expected Discharge Date and Time       Expected Discharge Date Expected Discharge Time    Joseph 3, 2025            Demographic Summary       Row Name 01/03/25 1152       General Information    Admission Type observation    Arrived From emergency department    Referral Source admission list    Reason for Consult discharge planning    Preferred Language English       Contact Information    Permission Granted to Share Info With                    Functional Status       Row Name 01/03/25 1153       Functional Status    Usual Activity Tolerance good    Current Activity Tolerance good       Functional Status, IADL    Medications independent    Meal Preparation independent    Housekeeping independent    Laundry independent    Shopping independent       Mental Status    General Appearance WDL WDL       Mental Status Summary    Recent Changes in Mental Status/Cognitive Functioning no changes             Yeni Damian RN     Office: [phone]

## 2025-01-03 NOTE — PLAN OF CARE
Goal Outcome Evaluation:  Plan of Care Reviewed With: patient           Outcome Evaluation: pt did not rest much during the night, c/o pain gave prn meds, tolerated IV antibiotics well, left arm remains in sling with head of bed elvate

## 2025-01-03 NOTE — CASE MANAGEMENT/SOCIAL WORK
Case Management Discharge Note      Final Note: Routine home         Selected Continued Care - Discharged on 1/3/2025 Admission date: 1/2/2025 - Discharge disposition: Home or Self Care       Transportation Services  Private: Car    Final Discharge Disposition Code: 01 - home or self-care

## 2025-01-03 NOTE — DISCHARGE SUMMARY
[facility]    Date of Admission: 1/2/2025    Date of Discharge:  1/3/2025    Length of stay:  LOS: 0 days     Admission Diagnosis: atrial fibrillation, atrial flutter    Discharge Diagnosis: same, status post ablation and pacemaker implantation    Presenting Problem/History of Present Illness  Active Hospital Problems    Diagnosis  POA    **Paroxysmal atrial fibrillation [I48.0]  Unknown    Sick sinus syndrome [I49.5]  Yes      Resolved Hospital Problems   No resolved problems to display.          Hospital Course  Joi Cheng is a 69 y.o. female presented for an elective atrial fibrillation ablation and dual-chamber pacemaker on 1/2/2025. There were no procedural complications, no bleeding or hematoma at vascular access sites.  Patient was seen and examined this morning, no issues overnight, she remained in sinus rhythm. Device interrogation showed appropriate function and chest x-ray showed good lead positioning.  Patient will be discharged home today in a stable condition with Keflex for 5 days, Norco for 3 days, and flecainide.  We will do a 2-week wound check appointment and then follow up with Dr. Iraheta in approximately 4-6 weeks for ablation follow up.    The discharge process took about 35 minutes during which we discussed about PM monitoring, wound care, medications,arm restrictions, A-fib management, medication changes, and anticoagulation.  The patient voiced understanding and all her questions were answered to her satisfaction.          Past Medical History:   Diagnosis Date    Abnormal ECG 6/2/2023    Diabetes mellitus     Disease of thyroid gland     Hyperlipidemia     Hypertension     MRSA carrier     New onset atrial fibrillation 10/05/2024    PONV (postoperative nausea and vomiting)     Rheumatoid arthritis     Sinus drainage      Past Surgical History:   Procedure Laterality Date    ABDOMINAL SURGERY      CHOLECYSTECTOMY      FINGER SURGERY      HYSTERECTOMY      INCISION AND DRAINAGE OF  WOUND Right 04/06/2021    Procedure: Incision and drainage of abscess of right face/cheek;  Surgeon: Ariel Hernandes DO;  Location: [facility] MAIN OR;  Service: General;  Laterality: Right;    NECK SURGERY      April 2024    OOPHORECTOMY      THYROID SURGERY      TUBAL ABDOMINAL LIGATION      1 tube removed     Social History     Socioeconomic History    Marital status:    Tobacco Use    Smoking status: Never     Passive exposure: Never    Smokeless tobacco: Never   Vaping Use    Vaping status: Never Used   Substance and Sexual Activity    Alcohol use: Not Currently     Comment: Rarely do I drink    Drug use: Never    Sexual activity: Not Currently     Partners: Male     Birth control/protection: Hysterectomy       Procedures Performed  A-fib ablation with pulmonary vein isolation using pulsed field energy and radiofrequency ablation of cavotricuspid isthmus for typical atrial flutter.  This was followed by dual-chamber pacemaker implantation which will be reported below.        1.  Comprehensive EP study with pacing from multiple sites including coronary sinus ,RV and superior vena cava with induction of arrhythmias   2.  Pulsed field ablation of pulmonary veins with pulmonary vein isolation  3. Intracardiac echocardiography  4.  Fluoroscopy  5. Trans septal  access of left atrium  6.  3D electroanatomic mapping using CARTO mapping system  8.  Ultrasound-guided venous access.    Consults:   Consulting Physician(s)                     None                Pertinent Test Results:     Lab Results (last 72 hours)       Procedure Component Value Units Date/Time    Basic Metabolic Panel [[id number]]  (Abnormal) Collected: 01/03/25 0020    Specimen: Blood from Arm, Right Updated: 01/03/25 0115     Glucose 155 mg/dL      BUN 25 mg/dL      Creatinine 1.14 mg/dL      Sodium 141 mmol/L      Potassium 3.9 mmol/L      Chloride 106 mmol/L      CO2 25.9 mmol/L      Calcium 8.5 mg/dL      BUN/Creatinine Ratio 21.9     Anion Gap  9.1 mmol/L      eGFR 52.5 mL/min/1.73     Narrative:      GFR Categories in Chronic Kidney Disease (CKD)      GFR Category          GFR (mL/min/1.73)    Interpretation  G1                     90 or greater         Normal or high (1)  G2                      60-89                Mild decrease (1)  G3a                   45-59                Mild to moderate decrease  G3b                   30-44                Moderate to severe decrease  G4                    15-29                Severe decrease  G5                    14 or less           Kidney failure          (1)In the absence of evidence of kidney disease, neither GFR category G1 or G2 fulfill the criteria for CKD.    eGFR calculation 2021 CKD-EPI creatinine equation, which does not include race as a factor    CBC (No Diff) [[id number]]  (Normal) Collected: 01/03/25 0020    Specimen: Blood from Arm, Right Updated: 01/03/25 0032     WBC 10.26 10*3/mm3      RBC 4.62 10*6/mm3      Hemoglobin 13.3 g/dL      Hematocrit 42.2 %      MCV 91.3 fL      MCH 28.8 pg      MCHC 31.5 g/dL      RDW 13.8 %      RDW-SD 46.4 fl      MPV 12.0 fL      Platelets 167 10*3/mm3     POC Activated Clotting Time [[id number]]  (Abnormal) Collected: 01/02/25 1012    Specimen: Blood Updated: 01/02/25 1820     Activated Clotting Time  325 Seconds      Comment: Serial Number: [id number]:  [id number]       POC Activated Clotting Time [[id number]]  (Abnormal) Collected: 01/02/25 0941    Specimen: Venous Blood Updated: 01/02/25 1820     Activated Clotting Time  343 Seconds      Comment: Serial Number: [id number]:  [id number]       POC Activated Clotting Time [[id number]]  (Abnormal) Collected: 01/02/25 0926    Specimen: Blood Updated: 01/02/25 1819     Activated Clotting Time  262 Seconds      Comment: Serial Number: [id number]:  [id number]       POC Activated Clotting Time [[id number]]  (Abnormal) Collected: 01/02/25 0904    Specimen: Blood Updated: 01/02/25 1819     Activated Clotting Time  233 Seconds       Comment: Serial Number: [id number]:  [id number]       POC Glucose Once [[id number]]  (Abnormal) Collected: 01/02/25 1257    Specimen: Blood Updated: 01/02/25 1259     Glucose 131 mg/dL      Comment: Serial Number: [id number]:  [id number]       MRSA Screen, PCR (Inpatient) - Swab, Nares [[id number]]  (Normal) Collected: 01/02/25 0700    Specimen: Swab from Nares Updated: 01/02/25 0819     MRSA PCR No MRSA Detected    Narrative:      The negative predictive value of this diagnostic test is high and should only be used to consider de-escalating anti-MRSA therapy. A positive result may indicate colonization with MRSA and must be correlated clinically.            Results for orders placed during the hospital encounter of 07/05/23    Adult Transthoracic Echo Complete W/ Cont if Necessary Per Protocol    Interpretation Summary    Left ventricular ejection fraction appears to be 66 - 70%.    Estimated right ventricular systolic pressure from tricuspid regurgitation is normal (<35 mmHg).      Imaging Results (Last 72 Hours)       Procedure Component Value Units Date/Time    XR Chest 1 View [[id number]] Collected: 01/02/25 1439     Updated: 01/02/25 1443    Narrative:      XR CHEST 1 VW    Date of Exam: 1/2/2025 1:52 PM EST    Indication: Post ICD / Pacer Implant    Comparison: AP chest 11/12/2024    Findings:  Heart size appears mildly enlarged. Pacemaker generator projects over the left upper thorax with leads extending to the expected locations of the right atrium and right ventricle. No pneumothorax is seen. Minimal linear subsegmental atelectasis is   suggested in the left lower lobe. There are surgical changes within the cervical spine.      Impression:      Impression:    1. Pacemaker placement as described. No visible pneumothorax.  2. Mild cardiac enlargement.  3. Mild left basilar linear subsegmental atelectasis.      Electronically Signed: Meme Cox MD    1/2/2025 2:40 PM EST    Workstation ID:  "[id number]              Condition on Discharge:  stable    Vital Signs  /78 (BP Location: Right arm, Patient Position: Lying)   Pulse 80   Temp 98 °F (36.7 °C) (Oral)   Resp 21   Ht 162.6 cm (64\")   Wt 78.7 kg (173 lb 8 oz)   SpO2 93%   BMI 29.78 kg/m²     Physical Exam  Vitals reviewed.   Constitutional:       Appearance: Normal appearance.   HENT:      Head: Normocephalic.   Eyes:      Pupils: Pupils are equal, round, and reactive to light.   Cardiovascular:      Rate and Rhythm: Normal rate and regular rhythm.      Comments: Left chest incision site clean and dry with steri-strips intact. No bleeding or hematoma noted at site.    Right groin vascular access site clean and dry. Soft, slightly tender. Considerable bruising noted at site. No hematoma.  Pulmonary:      Effort: Pulmonary effort is normal.      Breath sounds: Normal breath sounds.   Abdominal:      General: Bowel sounds are normal.   Musculoskeletal:         General: Normal range of motion.   Skin:     General: Skin is warm and dry.   Neurological:      Mental Status: She is alert and oriented to person, place, and time. Mental status is at baseline.   Psychiatric:         Behavior: Behavior is cooperative.         Discharge Disposition  Home or Self Care    Discharge Medications     Discharge Medications        New Medications        Instructions Start Date   cephalexin 500 MG capsule  Commonly known as: Keflex   500 mg, Oral, 2 Times Daily      flecainide 50 MG tablet  Commonly known as: TAMBOCOR   50 mg, Oral, 2 Times Daily      HYDROcodone-acetaminophen 5-325 MG per tablet  Commonly known as: NORCO   1 tablet, Oral, Every 6 Hours PRN             Continue These Medications        Instructions Start Date   amLODIPine 5 MG tablet  Commonly known as: NORVASC   5 mg, Daily      apixaban 5 MG tablet tablet  Commonly known as: ELIQUIS   5 mg, Oral, 2 Times Daily      Farxiga 10 MG tablet  Generic drug: dapagliflozin Propanediol   1 tablet, " Daily      ibuprofen 200 MG tablet  Commonly known as: ADVIL,MOTRIN   200 mg, Every 6 Hours PRN      levothyroxine 100 MCG tablet  Commonly known as: SYNTHROID, LEVOTHROID   100 mcg, Take As Directed      losartan 100 MG tablet  Commonly known as: COZAAR   100 mg, Every Evening      potassium chloride ER 20 MEQ tablet controlled-release ER tablet  Commonly known as: K-TAB   20 mEq, 2 Times Daily      rosuvastatin 5 MG tablet  Commonly known as: CRESTOR   5 mg, 3 Times Weekly               Discharge Diet:  cardiac    Activity at Discharge:  physical limitations and restrictions discussed with patient      Follow-up Appointments  Future Appointments   Date Time Provider Department Center   1/13/2025  1:30 PM [facility] [place] [facility]ICE [facility]   2/5/2025 11:30 AM Carleen Iraheta MD [facility]   4/15/2025  2:30 PM Sam Chao MD [facility]       Risk for Readmission (LACE) Score: 7 (1/3/2025  6:00 AM)      WENDY Kirkland  01/03/25  10:49 EST  Electronically signed by WENDY Kirkland, 01/03/25, 10:55 AM EST.

## 2025-01-03 NOTE — NURSING NOTE
Pt post ablation and pacemaker earlier today c/o electric shock aproximately 5 of them 1st one hurt very bad was near incision area, was fast and hard, then followed by 3 small fast less painful ones near bottom of heart then about 5 minutes later another single hard pain at top of heart,    EKG obtained and currently have a call out to cardiologist on call

## 2025-01-04 ENCOUNTER — READMISSION MANAGEMENT (OUTPATIENT)
Dept: CALL CENTER | Facility: HOSPITAL | Age: 70
End: 2025-01-04
Payer: MEDICARE

## 2025-01-04 NOTE — OUTREACH NOTE
Prep Survey      Flowsheet Row Responses   [facility] facility patient discharged from? [facility]   Is LACE score < 7 ? No   Eligibility Readm Mgmt   Discharge diagnosis status post ablation and pacemaker implantation   Does the patient have one of the following disease processes/diagnoses(primary or secondary)? General Surgery   Does the patient have Home health ordered? No   Is there a DME ordered? No   Prep survey completed? Yes            [facility] PEDERSON - Registered Nurse

## 2025-01-08 ENCOUNTER — TELEPHONE (OUTPATIENT)
Dept: CARDIOLOGY | Facility: CLINIC | Age: 70
End: 2025-01-08
Payer: MEDICARE

## 2025-01-08 LAB
QT INTERVAL: 416 MS
QTC INTERVAL: 478 MS

## 2025-01-08 NOTE — TELEPHONE ENCOUNTER
Caller: Joi Cheng    Relationship: Self    Best call back number: [phone]    What is the best time to reach you: ANYTIME     Who are you requesting to speak with (clinical staff, provider,  specific staff member): CLINICAL        What was the call regarding: PT HAD PACE MAKER IMPLANTED 01/02/2025.  SHE WAS DOING FINE UNTIL YESTERDAY, SHE WOKE UP DIZZY.  IT GOT BETTER BUT STARTED BACK UP AND SHE IS STILL HAVING DIZZINESS. HER BALANCE OF OFF.  SHE IS ALSO  HAVING ACID REFLUX.  SHE IS NOT HAVING PAIN.   PLEASE CALL TO DISCUSS.

## 2025-01-08 NOTE — TELEPHONE ENCOUNTER
Spoke with patient. Her b/p has been good, and bs has been good too. Did recommend drinking more fluids and she verbalized understanding. The incision site is healing up good, no pain. No concerns from a cardiac standpoint

## 2025-01-09 ENCOUNTER — READMISSION MANAGEMENT (OUTPATIENT)
Dept: CALL CENTER | Facility: HOSPITAL | Age: 70
End: 2025-01-09
Payer: MEDICARE

## 2025-01-09 NOTE — OUTREACH NOTE
General Surgery Week 1 Survey      Flowsheet Row Responses   [facility] patient discharged from? [facility]   Does the patient have one of the following disease processes/diagnoses(primary or secondary)? General Surgery   Week 1 attempt successful? Yes   Call start time 0951   Call end time 0959   Meds reviewed with patient/caregiver? Yes   Is the patient having any side effects they believe may be caused by any medication additions or changes? No   Does the patient have all medications related to this admission filled (includes all antibiotics, pain medications, etc.) Yes   Is the patient taking all medications as directed (includes completed medication regime)? Yes   Does the patient have a follow up appointment scheduled with their surgeon? Yes   Has the patient kept scheduled appointments due by today? N/A   Comments Cardiologist appt 01/13/25. Dr Iraheta appt 02/05/25.   Has home health visited the patient within 72 hours of discharge? N/A   Psychosocial issues? No   Did the patient receive a copy of their discharge instructions? Yes   Nursing interventions Reviewed instructions with patient   What is the patient's perception of their health status since discharge? Improving   Nursing interventions Nurse provided patient education   Is the patient /caregiver able to teach back basic post-op care? Practice 'cough and deep breath', Drive as instructed by MD in discharge instructions, Take showers only when approved by MD-sponge bathe until then, No tub bath, swimming, or hot tub until instructed by MD, Keep incision areas clean,dry and protected, Do not remove steri-strips, Lifting as instructed by MD in discharge instructions   Is the patient/caregiver able to teach back signs and symptoms of incisional infection? Increased redness, swelling or pain at the incisonal site, Increased drainage or bleeding, Incisional warmth, Pus or odor from incision, Fever   Is the patient/caregiver able to teach back steps  "to recovery at home? Set small, achievable goals for return to baseline health, Rest and rebuild strength, gradually increase activity, Practice good oral hygiene, Eat a well-balance diet   If the patient is a current smoker, are they able to teach back resources for cessation? Not a smoker   Is the patient/caregiver able to teach back the hierarchy of who to call/visit for symptoms/problems? PCP, Specialist, Home health nurse, Urgent Care, ED, 911 Yes   Additional teach back comments States is monitoring her BP/HR at home. HR in the 70s, BP \"good\".   Week 1 call completed? Yes   Graduated Yes   Is the patient interested in additional calls from an ambulatory ? No   Would this patient benefit from a Referral to [facility] Social Work? No   Graduated/Revoked comments States is doing well today, and will call if has any questions/concerns.   Wrap up additional comments Patient states is improving. States incision dry/intact without s/s of infection. States had some dizziness a few days, but has resolved, and doing well. States discussed dizziness with her cardiologist. Denies any needs or concerns today.   Call end time 0958            Dari NAJERA - Registered Nurse  "

## 2025-01-13 ENCOUNTER — CLINICAL SUPPORT NO REQUIREMENTS (OUTPATIENT)
Dept: CARDIOLOGY | Facility: CLINIC | Age: 70
End: 2025-01-13
Payer: MEDICARE

## 2025-01-13 DIAGNOSIS — R00.1 BRADYCARDIA: Primary | ICD-10-CM

## 2025-01-13 DIAGNOSIS — Z95.0 PACEMAKER: ICD-10-CM

## 2025-01-13 DIAGNOSIS — I49.5 SICK SINUS SYNDROME: ICD-10-CM

## 2025-01-13 PROCEDURE — 93280 PM DEVICE PROGR EVAL DUAL: CPT | Performed by: INTERNAL MEDICINE

## 2025-01-13 NOTE — PROGRESS NOTES
Patient is here today s/p pacemaker placement. Interrogation of device is normal, leads stable. RV lead at implant threshold was 1.0 @ .4 and today was 2.0 @ .4 today. No A Fib or flutter noted. Removed steri strips with no issues. Incision is healed, well approximated with no redness, swelling or drainage noted. Patient teaching for left arm precautions, patient released to drive and has appts scheduled.

## 2025-01-30 RX ORDER — APIXABAN 5 MG/1
5 TABLET, FILM COATED ORAL 2 TIMES DAILY
Qty: 60 TABLET | Refills: 2 | Status: SHIPPED | OUTPATIENT
Start: 2025-01-30

## 2025-01-30 NOTE — TELEPHONE ENCOUNTER
Rx Refill Note  Requested Prescriptions     Signed Prescriptions Disp Refills    apixaban (Eliquis) 5 MG tablet tablet 60 tablet 2     Sig: TAKE 1 TABLET BY MOUTH 2 TIMES A DAY     Authorizing Provider: MADELINE WICK     Ordering User: CATHY BAPTISTE      Last office visit with prescribing clinician: 10/14/2024   Last telemedicine visit with prescribing clinician: Visit date not found   Next office visit with prescribing clinician: 4/15/2025                         Would you like a call back once the refill request has been completed: [] Yes [] No    If the office needs to give you a call back, can they leave a voicemail: [] Yes [] No    Cathy Baptiste MA  01/30/25, 08:34 EST

## 2025-02-05 ENCOUNTER — OFFICE VISIT (OUTPATIENT)
Dept: CARDIOLOGY | Facility: CLINIC | Age: 70
End: 2025-02-05
Payer: MEDICARE

## 2025-02-05 VITALS
DIASTOLIC BLOOD PRESSURE: 97 MMHG | OXYGEN SATURATION: 97 % | SYSTOLIC BLOOD PRESSURE: 197 MMHG | WEIGHT: 178.8 LBS | HEIGHT: 68 IN | HEART RATE: 68 BPM | BODY MASS INDEX: 27.1 KG/M2

## 2025-02-05 DIAGNOSIS — I49.5 SICK SINUS SYNDROME: ICD-10-CM

## 2025-02-05 DIAGNOSIS — Z95.0 PACEMAKER: ICD-10-CM

## 2025-02-05 DIAGNOSIS — I10 ESSENTIAL HYPERTENSION: Chronic | ICD-10-CM

## 2025-02-05 DIAGNOSIS — I48.0 PAROXYSMAL ATRIAL FIBRILLATION: Primary | ICD-10-CM

## 2025-02-05 DIAGNOSIS — R00.1 BRADYCARDIA: ICD-10-CM

## 2025-02-05 NOTE — PROGRESS NOTES
HP      Name: Joi Cheng ADMIT: (Not on file)   : 1955  PCP: Ariel Rider MD    MRN: [id number] LOS: 0 days   AGE/SEX: 69 y.o. female  ROOM: Room/bed info not found     Chief Complaint   Patient presents with    Follow-up     30 d post ablation--afib       Subjective        History of present illness  Joi Cheng is a 69-year-old female patient who has paroxysmal atrial fibrillation and sick sinus syndrome, received PVI plus flutter line plus dual-chamber left bundle pacemaker on 2025, here today for follow-up.  Patient is feeling much better, more energy, less shortness of breath.    Past Medical History:   Diagnosis Date    Abnormal ECG 2023    Arrhythmia     Diabetes mellitus     Disease of thyroid gland     Hyperlipidemia     Hypertension     MRSA carrier     New onset atrial fibrillation 10/05/2024    PONV (postoperative nausea and vomiting)     Rheumatoid arthritis     Sinus drainage      Past Surgical History:   Procedure Laterality Date    ABDOMINAL SURGERY      ABLATION OF DYSRHYTHMIC FOCUS      CARDIAC ELECTROPHYSIOLOGY PROCEDURE N/A 2025    Procedure: Ablation atrial fibrillation and flutter. PFA - REPS EMAILED;  Surgeon: Carleen Iraheta MD;  Location: [facility] CATH INVASIVE LOCATION;  Service: Cardiovascular;  Laterality: N/A;    CARDIAC ELECTROPHYSIOLOGY PROCEDURE N/A 2025    Procedure: Pacemaker DC new, Mdt 3830 - REPS EMAILED;  Surgeon: Carleen Iraheta MD;  Location: [facility] CATH INVASIVE LOCATION;  Service: Cardiovascular;  Laterality: N/A;    CHOLECYSTECTOMY      FINGER SURGERY      HYSTERECTOMY      INCISION AND DRAINAGE OF WOUND Right 2021    Procedure: Incision and drainage of abscess of right face/cheek;  Surgeon: Ariel Hernandes DO;  Location: [facility] MAIN OR;  Service: General;  Laterality: Right;    INSERT / REPLACE / REMOVE PACEMAKER  2025    NECK SURGERY      2024    OOPHORECTOMY      THYROID SURGERY      TUBAL ABDOMINAL  LIGATION      1 tube removed     Family History   Problem Relation Age of Onset    Diabetes Mother     Heart attack Mother     Heart disease Mother     Heart failure Mother     COPD Father     Arrhythmia Father      Social History     Tobacco Use    Smoking status: Never     Passive exposure: Never    Smokeless tobacco: Never   Vaping Use    Vaping status: Never Used   Substance Use Topics    Alcohol use: Not Currently     Comment: Rarely do I drink    Drug use: Never     (Not in a hospital admission)    Allergies:  Lisinopril    Review of systems    Constitutional: Negative.    Respiratory and cardiovascular: As detailed in HPI section.  Gastrointestinal: Negative for constipation, nausea and vomiting negative for abdominal distention, abdominal pain and diarrhea.   Genitourinary: Negative for difficulty urinating and flank pain.   Musculoskeletal: Negative for arthralgias, joint swelling and myalgias.   Skin: Negative for color change, rash and wound.   Neurological: Negative for dizziness, syncope, weakness and headaches.   Hematological: Negative for adenopathy.   Psychiatric/Behavioral: Negative for confusion.   All other systems reviewed and are negative.    Physical Exam  VITALS REVIEWED    General:      well developed, in no acute distress.    Head:      normocephalic and atraumatic.    Eyes:      PERRL/EOM intact, conjunctiva and sclera clear with out nystagmus.    Neck:      no masses, thyromegaly,  trachea central with normal respiratory effort and PMI displaced laterally  Lungs:      Clear to auscultation bilaterally  Heart:       Regular rate and rhythm  Msk:      no deformity or scoliosis noted of thoracic or lumbar spine.    Pulses:      pulses normal in all 4 extremities.    Extremities:       No lower extremity edema  Neurologic:      no focal deficits.   alert oriented x3  Skin:      intact without lesions or rashes.    Psych:      alert and cooperative; normal mood and affect; normal attention  span and concentration.      Result Review :               Pertinent cardiac workup    EKG 11/12/2024 sinus rhythm  Event monitor 27 days and 22 hours starting on 9/17/2024 showed atrial fibrillation with paroxysmal atrial fibrillation and atrial flutter.  Both arrhythmias seen.  She also has episodes of sinus bradycardia, lowest heart rate was 37 bpm.  Also a 3.5-second conversion pause was seen.         Procedures        Assessment and Plan      Joi Cheng is a 69-year-old female patient who has sick sinus syndrome, symptomatic bradycardia, paroxysmal A-fib and atrial flutter.  Patient received combined ablation with PVI and flutter line followed by dual-chamber left bundle pacemaker implantation on 1/2/2025.  She is feeling much better, device interrogation did not show any A-fib.  Will continue flecainide for a few more months.  Her blood pressure has been elevated, advised her to go up on amlodipine to 10 mg, continue losartan.      Diagnoses and all orders for this visit:    1. Paroxysmal atrial fibrillation (Primary)    2. Essential hypertension    3. Bradycardia    4. Sick sinus syndrome    5. Pacemaker           Return in about 1 year (around 2/5/2026).  Patient was given instructions and counseling regarding her condition or for health maintenance advice. Please see specific information pulled into the AVS if appropriate.     Electronically signed by Carleen Iraheta MD, 02/05/25, 9:18 AM EST.

## 2025-04-15 ENCOUNTER — OFFICE VISIT (OUTPATIENT)
Dept: CARDIOLOGY | Facility: CLINIC | Age: 70
End: 2025-04-15
Payer: MEDICARE

## 2025-04-15 VITALS
SYSTOLIC BLOOD PRESSURE: 128 MMHG | OXYGEN SATURATION: 100 % | WEIGHT: 184 LBS | HEIGHT: 68 IN | DIASTOLIC BLOOD PRESSURE: 68 MMHG | HEART RATE: 71 BPM | BODY MASS INDEX: 27.89 KG/M2

## 2025-04-15 DIAGNOSIS — I49.5 SICK SINUS SYNDROME: Primary | ICD-10-CM

## 2025-04-15 DIAGNOSIS — I48.0 PAROXYSMAL ATRIAL FIBRILLATION: ICD-10-CM

## 2025-04-15 DIAGNOSIS — E11.9 TYPE 2 DIABETES MELLITUS WITHOUT COMPLICATION, WITHOUT LONG-TERM CURRENT USE OF INSULIN: ICD-10-CM

## 2025-04-15 DIAGNOSIS — E78.00 PURE HYPERCHOLESTEROLEMIA: ICD-10-CM

## 2025-04-15 DIAGNOSIS — I10 ESSENTIAL HYPERTENSION: ICD-10-CM

## 2025-04-15 DIAGNOSIS — Z95.0 PACEMAKER: ICD-10-CM

## 2025-04-15 NOTE — PROGRESS NOTES
Subjective:     Encounter Date:04/15/2025      Patient ID: Joi Cheng is a 69 y.o. female.    Chief Complaint:  Atrial Fibrillation  Symptoms are negative for chest pain, dizziness, palpitations, shortness of breath and weakness. Past medical history includes atrial fibrillation.     69-year-old white female with history of sick sinus syndrome status post pacemaker placement atrial fibrillation hypertension hyperlipidemia and diabetes presents to my office for a follow-up.  Patient is currently stable without any symptoms of chest pain or shortness of breath at rest or exertion.  No complaint of any PND orthopnea.  No palpitations dizziness syncope.  Patient has some swelling of the feet.  She is taking her medicines regularly which she does not smoke.  The following portions of the patient's history were reviewed and updated as appropriate: allergies, current medications, past family history, past medical history, past social history, past surgical history, and problem list.  Past Medical History:   Diagnosis Date    Abnormal ECG 6/2/2023    Arrhythmia     Diabetes mellitus     Disease of thyroid gland     Hyperlipidemia     Hypertension     MRSA carrier     New onset atrial fibrillation 10/05/2024    PONV (postoperative nausea and vomiting)     Rheumatoid arthritis     Sinus drainage      Past Surgical History:   Procedure Laterality Date    ABDOMINAL SURGERY      ABLATION OF DYSRHYTHMIC FOCUS      CARDIAC ELECTROPHYSIOLOGY PROCEDURE N/A 01/02/2025    Procedure: Ablation atrial fibrillation and flutter. PFA - REPS EMAILED;  Surgeon: Carleen Iraheta MD;  Location: [facility] CATH INVASIVE LOCATION;  Service: Cardiovascular;  Laterality: N/A;    CARDIAC ELECTROPHYSIOLOGY PROCEDURE N/A 01/02/2025    Procedure: Pacemaker DC new, Mdt 3830 - REPS EMAILED;  Surgeon: Carleen Iraheta MD;  Location: [facility] CATH INVASIVE LOCATION;  Service: Cardiovascular;  Laterality: N/A;    CHOLECYSTECTOMY      FINGER  "SURGERY      HYSTERECTOMY      INCISION AND DRAINAGE OF WOUND Right 04/06/2021    Procedure: Incision and drainage of abscess of right face/cheek;  Surgeon: Ariel Hernandes DO;  Location: [facility] MAIN OR;  Service: General;  Laterality: Right;    INSERT / REPLACE / REMOVE PACEMAKER  1/2/2025    NECK SURGERY      April 2024    OOPHORECTOMY      THYROID SURGERY      TUBAL ABDOMINAL LIGATION      1 tube removed     /68   Pulse 71   Ht 172.7 cm (67.99\")   Wt 83.5 kg (184 lb)   SpO2 100%   BMI 27.98 kg/m²   Family History   Problem Relation Age of Onset    Diabetes Mother     Heart attack Mother     Heart disease Mother     Heart failure Mother     COPD Father     Arrhythmia Father        Current Outpatient Medications:     amLODIPine (NORVASC) 5 MG tablet, Take 1 tablet by mouth Daily., Disp: , Rfl:     apixaban (Eliquis) 5 MG tablet tablet, TAKE 1 TABLET BY MOUTH 2 TIMES A DAY, Disp: 60 tablet, Rfl: 2    dapagliflozin Propanediol (Farxiga) 10 MG tablet, Take 10 mg by mouth Daily., Disp: , Rfl:     flecainide (TAMBOCOR) 50 MG tablet, Take 1 tablet by mouth 2 (Two) Times a Day., Disp: 60 tablet, Rfl: 11    levothyroxine (SYNTHROID, LEVOTHROID) 100 MCG tablet, Take 1 tablet by mouth Take As Directed. 6 days/week (does not take on Sundays), Disp: , Rfl:     losartan (COZAAR) 100 MG tablet, Take 1 tablet by mouth Every Evening., Disp: , Rfl:     potassium chloride ER (K-TAB) 20 MEQ tablet controlled-release ER tablet, Take 1 tablet by mouth 2 (Two) Times a Day., Disp: , Rfl:     rosuvastatin (CRESTOR) 5 MG tablet, Take 1 tablet by mouth 3 (Three) Times a Week. Monday, Wednesday and Friday, Disp: , Rfl:   Allergies   Allergen Reactions    Lisinopril Anaphylaxis     Social History     Socioeconomic History    Marital status:    Tobacco Use    Smoking status: Never     Passive exposure: Never    Smokeless tobacco: Never   Vaping Use    Vaping status: Never Used   Substance and Sexual Activity    Alcohol use: " Not Currently     Comment: Rarely do I drink    Drug use: Never    Sexual activity: Not Currently     Partners: Male     Birth control/protection: Hysterectomy     Review of Systems   Constitutional: Negative for malaise/fatigue.   Cardiovascular:  Positive for leg swelling. Negative for chest pain, dyspnea on exertion and palpitations.   Respiratory:  Negative for cough and shortness of breath.    Gastrointestinal:  Negative for abdominal pain, nausea and vomiting.   Neurological:  Negative for dizziness, headaches, light-headedness, numbness and weakness.   All other systems reviewed and are negative.             Objective:     Constitutional:       Appearance: Well-developed.   Eyes:      General: No scleral icterus.     Conjunctiva/sclera: Conjunctivae normal.   HENT:      Head: Normocephalic and atraumatic.   Neck:      Vascular: No carotid bruit or JVD.   Pulmonary:      Effort: Pulmonary effort is normal.      Breath sounds: Normal breath sounds. No wheezing. No rales.   Cardiovascular:      Normal rate. Regular rhythm.   Pulses:     Intact distal pulses.   Abdominal:      General: Bowel sounds are normal.      Palpations: Abdomen is soft.   Musculoskeletal:      Cervical back: Normal range of motion and neck supple. Skin:     General: Skin is warm and dry.      Findings: No rash.   Neurological:      Mental Status: Alert.       Procedures    Lab Review:         MDM    #1 sick sinus syndrome  Patient has bradycardia status post pacemaker placement and the pacemaker is working very well.  2.  Paroxysmal afibrillation  Patient is history of paroxysmal fibrillation and is currently on Eliquis for anticoagulation and also on flecainide  3.  Diabetes  Patient is on Farxiga but followed by the primary care doctor  4.  Hypertension  Patient blood pressure currently stable on losartan and amlodipine  5.  Hyperlipidemia  Patient on Crestor the lipids are well within normal limits.    Patient's previous medical  records, labs, and EKG were reviewed and discussed with the patient at today's visit.

## 2025-07-22 LAB
MC_CV_MDC_IDC_RATE_1: 150
MC_CV_MDC_IDC_RATE_1: 171
MC_CV_MDC_IDC_THERAPIES: NORMAL
MC_CV_MDC_IDC_ZONE_ID: 2
MC_CV_MDC_IDC_ZONE_ID: 6
MDC_IDC_MSMT_BATTERY_REMAINING_LONGEVITY: 167 MO
MDC_IDC_MSMT_BATTERY_RRT_TRIGGER: 2.62
MDC_IDC_MSMT_BATTERY_STATUS: NORMAL
MDC_IDC_MSMT_BATTERY_VOLTAGE: 3.17
MDC_IDC_MSMT_LEADCHNL_RA_DTM: NORMAL
MDC_IDC_MSMT_LEADCHNL_RA_IMPEDANCE_VALUE: 456
MDC_IDC_MSMT_LEADCHNL_RA_PACING_THRESHOLD_AMPLITUDE: 0.62
MDC_IDC_MSMT_LEADCHNL_RA_PACING_THRESHOLD_POLARITY: NORMAL
MDC_IDC_MSMT_LEADCHNL_RA_PACING_THRESHOLD_PULSEWIDTH: 0.4
MDC_IDC_MSMT_LEADCHNL_RA_SENSING_INTR_AMPL: 2.25
MDC_IDC_MSMT_LEADCHNL_RV_DTM: NORMAL
MDC_IDC_MSMT_LEADCHNL_RV_IMPEDANCE_VALUE: 532
MDC_IDC_MSMT_LEADCHNL_RV_PACING_THRESHOLD_AMPLITUDE: 1.25
MDC_IDC_MSMT_LEADCHNL_RV_PACING_THRESHOLD_POLARITY: NORMAL
MDC_IDC_MSMT_LEADCHNL_RV_PACING_THRESHOLD_PULSEWIDTH: 0.4
MDC_IDC_MSMT_LEADCHNL_RV_SENSING_INTR_AMPL: 5.62
MDC_IDC_PG_IMPLANT_DTM: NORMAL
MDC_IDC_PG_MFG: NORMAL
MDC_IDC_PG_MODEL: NORMAL
MDC_IDC_PG_SERIAL: NORMAL
MDC_IDC_PG_TYPE: NORMAL
MDC_IDC_SESS_DTM: NORMAL
MDC_IDC_SESS_TYPE: NORMAL
MDC_IDC_SET_BRADY_AT_MODE_SWITCH_RATE: 171
MDC_IDC_SET_BRADY_LOWRATE: 60
MDC_IDC_SET_BRADY_MAX_SENSOR_RATE: 130
MDC_IDC_SET_BRADY_MAX_TRACKING_RATE: 130
MDC_IDC_SET_BRADY_MODE: NORMAL
MDC_IDC_SET_BRADY_PAV_DELAY: 180
MDC_IDC_SET_BRADY_SAV_DELAY: 150
MDC_IDC_SET_LEADCHNL_RA_PACING_AMPLITUDE: 1.5
MDC_IDC_SET_LEADCHNL_RA_PACING_POLARITY: NORMAL
MDC_IDC_SET_LEADCHNL_RA_PACING_PULSEWIDTH: 0.4
MDC_IDC_SET_LEADCHNL_RA_SENSING_POLARITY: NORMAL
MDC_IDC_SET_LEADCHNL_RA_SENSING_SENSITIVITY: 0.3
MDC_IDC_SET_LEADCHNL_RV_PACING_AMPLITUDE: 2.5
MDC_IDC_SET_LEADCHNL_RV_PACING_POLARITY: NORMAL
MDC_IDC_SET_LEADCHNL_RV_PACING_PULSEWIDTH: 0.4
MDC_IDC_SET_LEADCHNL_RV_SENSING_POLARITY: NORMAL
MDC_IDC_SET_LEADCHNL_RV_SENSING_SENSITIVITY: 0.9
MDC_IDC_SET_ZONE_STATUS: NORMAL
MDC_IDC_SET_ZONE_STATUS: NORMAL
MDC_IDC_SET_ZONE_TYPE: NORMAL
MDC_IDC_SET_ZONE_TYPE: NORMAL
MDC_IDC_STAT_AT_BURDEN_PERCENT: 0
MDC_IDC_STAT_BRADY_RA_PERCENT_PACED: 53.83
MDC_IDC_STAT_BRADY_RV_PERCENT_PACED: 0.19

## 2025-08-27 PROCEDURE — 88305 TISSUE EXAM BY PATHOLOGIST: CPT | Performed by: RADIOLOGY

## 2025-08-28 ENCOUNTER — LAB REQUISITION (OUTPATIENT)
Dept: LAB | Facility: HOSPITAL | Age: 70
End: 2025-08-28
Payer: MEDICARE

## 2025-08-28 DIAGNOSIS — N63.10 UNSPECIFIED LUMP IN THE RIGHT BREAST, UNSPECIFIED QUADRANT: ICD-10-CM

## 2025-08-29 LAB
LAB AP CASE REPORT: NORMAL
LAB AP DIAGNOSIS COMMENT: NORMAL
PATH REPORT.FINAL DX SPEC: NORMAL
PATH REPORT.GROSS SPEC: NORMAL

## (undated) DEVICE — DRAPE,INSTRUMENT,MAGNETIC,10X16: Brand: MEDLINE

## (undated) DEVICE — ST ACC MICROPUNCTURE STFF/CANN PLAT/TP 4F 21G 40CM

## (undated) DEVICE — BANDAGE,GAUZE,BULKEE II,4.5"X4.1YD,STRL: Brand: MEDLINE

## (undated) DEVICE — Device

## (undated) DEVICE — SOUNDSTAR ECO 8F G CATHETER: Brand: SOUNDSTAR

## (undated) DEVICE — INTERFACE CABLE: Brand: CARTO 3 SYSTEM ECO INTERFACE CABLE

## (undated) DEVICE — CATHETER CONNECTION CABLE: Brand: FARASTAR™

## (undated) DEVICE — BI-DIRECTIONAL NAVIGATION CATHETER, NAV, D-F: Brand: QDOT MICRO

## (undated) DEVICE — Device: Brand: REFERENCE PATCH CARTO 3

## (undated) DEVICE — PK MINOR LAPAROTOMY 50

## (undated) DEVICE — ADHS LIQ MASTISOL 2/3ML

## (undated) DEVICE — INTRO PERFORMER CHECKFLO/LG RAD/BND NO/GW 18F .038IN 30CM

## (undated) DEVICE — CATH GUIDE RIGHTSITE C315HIS-02

## (undated) DEVICE — CABL BIPOL W/ALLGTR CLIP/SM 12FT

## (undated) DEVICE — VIOLET BRAIDED (POLYGLACTIN 910), SYNTHETIC ABSORBABLE SUTURE: Brand: COATED VICRYL

## (undated) DEVICE — ESWAB LQ AMIES  REG. NYLON FLOCKED  APPLICATOR: Brand: ESWAB™

## (undated) DEVICE — GAUZE,SPONGE,FLUFF,6"X6.75",STRL,5/TRAY: Brand: MEDLINE

## (undated) DEVICE — PROVE COVER: Brand: UNBRANDED

## (undated) DEVICE — STEERABLE SHEATH CLEAR: Brand: FARADRIVE™

## (undated) DEVICE — SLITTER CATH GUIDE ATTAIN ADJ

## (undated) DEVICE — NDL TRNSEP BRK XS LNG 18G 98CM A/

## (undated) DEVICE — ANTIBACTERIAL UNDYED BRAIDED (POLYGLACTIN 910), SYNTHETIC ABSORBABLE SUTURE: Brand: COATED VICRYL

## (undated) DEVICE — OCTA,PERSEID,2-2-2-2-2,D-CURVE: Brand: OCTARAY MAPPING CATHETER

## (undated) DEVICE — SUT ETHIB 0/0 MO6 I8IN CX45D

## (undated) DEVICE — TBG IV DRIP CHAMBER MACRO SGL 72IN

## (undated) DEVICE — SUT ETHLN 3/0 PS1 18IN 1663H

## (undated) DEVICE — Device: Brand: TORAYGUIDE GUIDEWIRE

## (undated) DEVICE — KT SURG TURNOVER 050

## (undated) DEVICE — Device: Brand: WEBSTER CS

## (undated) DEVICE — ELECTRD DEFIB M/FUNC PROPADZ RADIOL 2PK

## (undated) DEVICE — LN INJ CONTRST FLXCIL HP F/M LL 1200PSI72

## (undated) DEVICE — Device: Brand: CARTO 3

## (undated) DEVICE — PULSED FIELD ABLATION CATHETER: Brand: FARAWAVE™

## (undated) DEVICE — PINNACLE INTRODUCER SHEATH: Brand: PINNACLE

## (undated) DEVICE — BLANKT WARM UNDER/BDY FUL/ACC A/ 90X206CM

## (undated) DEVICE — PENCL SMOKE/EVAC MEGADYNE TELESCP 10FT

## (undated) DEVICE — GLV SURG BIOGEL SENSR LTX PF SZ7.5

## (undated) DEVICE — PACEMAKER CDS: Brand: MEDLINE INDUSTRIES, INC.

## (undated) DEVICE — PK TRY HEART CATH 50

## (undated) DEVICE — SOL IRRIG H2O 1000ML STRL

## (undated) DEVICE — INTRO STEER AGILIS NXT MED/CURL 8.5F

## (undated) DEVICE — DRN PENRS SIL 1/4X18IN LXF

## (undated) DEVICE — TX ECO EXT CABLE: Brand: TX ECO EXT CABLE

## (undated) DEVICE — GW TRNSEP SAFESEPT LT/ATRIAL RO 135CM .014IN

## (undated) DEVICE — CABL PACE BIPOL THRSHLD SHROUD PIN 8FT

## (undated) DEVICE — PAD E/S GRND SGL/FOIL 9FT/CORD DISP

## (undated) DEVICE — Device: Brand: SMARTABLATE

## (undated) DEVICE — 3M™ IOBAN™ 2 ANTIMICROBIAL INCISE DRAPE 6650EZ: Brand: IOBAN™ 2

## (undated) DEVICE — INTRO SHEATH PRELUDE SNAP .038 7F 13CM W/SDPRT